# Patient Record
Sex: FEMALE | Race: WHITE | NOT HISPANIC OR LATINO | Employment: OTHER | URBAN - NONMETROPOLITAN AREA
[De-identification: names, ages, dates, MRNs, and addresses within clinical notes are randomized per-mention and may not be internally consistent; named-entity substitution may affect disease eponyms.]

---

## 2017-02-03 ENCOUNTER — TRANSFERRED RECORDS (OUTPATIENT)
Dept: HEALTH INFORMATION MANAGEMENT | Facility: HOSPITAL | Age: 32
End: 2017-02-03

## 2017-02-03 LAB — PAP-ABSTRACT: NORMAL

## 2017-11-17 ENCOUNTER — TRANSFERRED RECORDS (OUTPATIENT)
Dept: HEALTH INFORMATION MANAGEMENT | Facility: HOSPITAL | Age: 32
End: 2017-11-17

## 2017-11-17 LAB — HIV 1&2 EXT: NORMAL

## 2021-02-19 NOTE — PROGRESS NOTES
"    Assessment & Plan     1. Encounter to establish care    2. Hypothyroidism due to acquired atrophy of thyroid  - TSH with free T4 reflex  - Lipid Profile  - Basic metabolic panel    3. Tobacco abuse counseling  cessation encouraged    4. Family history of bleeding or clotting disorder  - Factor 2 and 5 mutation analysis      Chart reviewed.       Tobacco Cessation:   reports that she has been smoking cigarettes. She has a 8.50 pack-year smoking history. She has never used smokeless tobacco.  Tobacco Cessation Action Plan: Information offered: Patient not interested at this time    BMI:   Estimated body mass index is 27.74 kg/m  as calculated from the following:    Height as of this encounter: 1.689 m (5' 6.5\").    Weight as of this encounter: 79.2 kg (174 lb 8 oz).   Follow-up in 6-8 weeks.       Zohreh Aguila NP  [facility] [place] [facility]    Subjective   Manju is a 35 year old who presents for the following health issues     HPI       New Patient/Transfer of Care  Hypothyroidism Follow-up      Since last visit, patient describes the following symptoms: anxiety, depression, fatigue and hair loss      How many servings of fruits and vegetables do you eat daily?  2-3    On average, how many sweetened beverages do you drink each day (Examples: soda, juice, sweet tea, etc.  Do NOT count diet or artificially sweetened beverages)?   0    How many days per week do you exercise enough to make your heart beat faster? 6    How many minutes a day do you exercise enough to make your heart beat faster? 60 or more    How many days per week do you miss taking your medication? 0 was out of insurance so has not taken levothyroxine     She has 2 sisters with factor 5, mom also.  She has never been tested and is requested screening.       Review of Systems   Constitutional, HEENT, cardiovascular, pulmonary, gi and gu systems are negative, except as otherwise noted.      Objective    /68 (BP Location: Left " "arm, Patient Position: Chair, Cuff Size: Adult Large)   Pulse 66   Temp 98.3  F (36.8  C) (Tympanic)   Resp 16   Ht 1.689 m (5' 6.5\")   Wt 79.2 kg (174 lb 8 oz)   SpO2 95%   BMI 27.74 kg/m    Body mass index is 27.74 kg/m .  Physical Exam   GENERAL: healthy, alert and no distress  NECK: no adenopathy, no asymmetry, masses, or scars, thyroid normal to palpation and no carotid bruits  RESP: lungs clear to auscultation - no rales, rhonchi or wheezes  CV: regular rate and rhythm, normal S1 S2, no S3 or S4, no murmur, click or rub, no peripheral edema and peripheral pulses strong  MS: no gross musculoskeletal defects noted, no edema  PSYCH: mentation appears normal, affect normal/bright        Results for orders placed or performed in visit on 03/08/21   TSH with free T4 reflex     Status: Abnormal   Result Value Ref Range    TSH 4.09 (H) 0.40 - 4.00 mU/L   Lipid Profile     Status: Abnormal   Result Value Ref Range    Cholesterol 132 <200 mg/dL    Triglycerides 72 <150 mg/dL    HDL Cholesterol 36 (L) >49 mg/dL    LDL Cholesterol Calculated 82 <100 mg/dL    Non HDL Cholesterol 96 <130 mg/dL   Basic metabolic panel     Status: None   Result Value Ref Range    Sodium 136 133 - 144 mmol/L    Potassium 4.1 3.4 - 5.3 mmol/L    Chloride 106 94 - 109 mmol/L    Carbon Dioxide 25 20 - 32 mmol/L    Anion Gap 5 3 - 14 mmol/L    Glucose 95 70 - 99 mg/dL    Urea Nitrogen 13 7 - 30 mg/dL    Creatinine 0.72 0.52 - 1.04 mg/dL    GFR Estimate >90 >60 mL/min/[1.73_m2]    GFR Estimate If Black >90 >60 mL/min/[1.73_m2]    Calcium 9.1 8.5 - 10.1 mg/dL             "

## 2021-03-08 ENCOUNTER — OFFICE VISIT (OUTPATIENT)
Dept: FAMILY MEDICINE | Facility: OTHER | Age: 36
End: 2021-03-08
Attending: NURSE PRACTITIONER
Payer: COMMERCIAL

## 2021-03-08 VITALS
SYSTOLIC BLOOD PRESSURE: 130 MMHG | TEMPERATURE: 98.3 F | RESPIRATION RATE: 16 BRPM | HEIGHT: 67 IN | WEIGHT: 174.5 LBS | OXYGEN SATURATION: 95 % | HEART RATE: 66 BPM | BODY MASS INDEX: 27.39 KG/M2 | DIASTOLIC BLOOD PRESSURE: 68 MMHG

## 2021-03-08 DIAGNOSIS — Z76.89 ENCOUNTER TO ESTABLISH CARE: Primary | ICD-10-CM

## 2021-03-08 DIAGNOSIS — Z83.2 FAMILY HISTORY OF BLEEDING OR CLOTTING DISORDER: ICD-10-CM

## 2021-03-08 DIAGNOSIS — E03.4 HYPOTHYROIDISM DUE TO ACQUIRED ATROPHY OF THYROID: ICD-10-CM

## 2021-03-08 DIAGNOSIS — Z71.6 TOBACCO ABUSE COUNSELING: ICD-10-CM

## 2021-03-08 LAB
ANION GAP SERPL CALCULATED.3IONS-SCNC: 5 MMOL/L (ref 3–14)
BUN SERPL-MCNC: 13 MG/DL (ref 7–30)
CALCIUM SERPL-MCNC: 9.1 MG/DL (ref 8.5–10.1)
CHLORIDE SERPL-SCNC: 106 MMOL/L (ref 94–109)
CHOLEST SERPL-MCNC: 132 MG/DL
CO2 SERPL-SCNC: 25 MMOL/L (ref 20–32)
CREAT SERPL-MCNC: 0.72 MG/DL (ref 0.52–1.04)
GFR SERPL CREATININE-BSD FRML MDRD: >90 ML/MIN/{1.73_M2}
GLUCOSE SERPL-MCNC: 95 MG/DL (ref 70–99)
HDLC SERPL-MCNC: 36 MG/DL
LDLC SERPL CALC-MCNC: 82 MG/DL
NONHDLC SERPL-MCNC: 96 MG/DL
POTASSIUM SERPL-SCNC: 4.1 MMOL/L (ref 3.4–5.3)
SODIUM SERPL-SCNC: 136 MMOL/L (ref 133–144)
T4 FREE SERPL-MCNC: 0.94 NG/DL (ref 0.76–1.46)
TRIGL SERPL-MCNC: 72 MG/DL
TSH SERPL DL<=0.005 MIU/L-ACNC: 4.09 MU/L (ref 0.4–4)

## 2021-03-08 PROCEDURE — 99204 OFFICE O/P NEW MOD 45 MIN: CPT | Performed by: NURSE PRACTITIONER

## 2021-03-08 PROCEDURE — 81241 F5 GENE: CPT | Performed by: NURSE PRACTITIONER

## 2021-03-08 PROCEDURE — 36415 COLL VENOUS BLD VENIPUNCTURE: CPT | Performed by: NURSE PRACTITIONER

## 2021-03-08 PROCEDURE — 84443 ASSAY THYROID STIM HORMONE: CPT | Performed by: NURSE PRACTITIONER

## 2021-03-08 PROCEDURE — 81240 F2 GENE: CPT | Performed by: NURSE PRACTITIONER

## 2021-03-08 PROCEDURE — 84439 ASSAY OF FREE THYROXINE: CPT | Performed by: NURSE PRACTITIONER

## 2021-03-08 PROCEDURE — 80061 LIPID PANEL: CPT | Performed by: NURSE PRACTITIONER

## 2021-03-08 PROCEDURE — 80048 BASIC METABOLIC PNL TOTAL CA: CPT | Performed by: NURSE PRACTITIONER

## 2021-03-08 RX ORDER — ALBUTEROL SULFATE 90 UG/1
2 AEROSOL, METERED RESPIRATORY (INHALATION) EVERY 6 HOURS
COMMUNITY
End: 2022-11-07

## 2021-03-08 RX ORDER — LEVOTHYROXINE SODIUM 200 UG/1
200 TABLET ORAL
COMMUNITY
Start: 2012-12-05 | End: 2021-03-10 | Stop reason: ALTCHOICE

## 2021-03-08 SDOH — HEALTH STABILITY: MENTAL HEALTH: HOW OFTEN DO YOU HAVE A DRINK CONTAINING ALCOHOL?: MONTHLY OR LESS

## 2021-03-08 SDOH — HEALTH STABILITY: MENTAL HEALTH: HOW OFTEN DO YOU HAVE 6 OR MORE DRINKS ON ONE OCCASION?: NOT ASKED

## 2021-03-08 SDOH — HEALTH STABILITY: MENTAL HEALTH: HOW MANY STANDARD DRINKS CONTAINING ALCOHOL DO YOU HAVE ON A TYPICAL DAY?: NOT ASKED

## 2021-03-08 ASSESSMENT — MIFFLIN-ST. JEOR: SCORE: 1521.22

## 2021-03-08 ASSESSMENT — ANXIETY QUESTIONNAIRES
4. TROUBLE RELAXING: SEVERAL DAYS
5. BEING SO RESTLESS THAT IT IS HARD TO SIT STILL: NOT AT ALL
1. FEELING NERVOUS, ANXIOUS, OR ON EDGE: NOT AT ALL
IF YOU CHECKED OFF ANY PROBLEMS ON THIS QUESTIONNAIRE, HOW DIFFICULT HAVE THESE PROBLEMS MADE IT FOR YOU TO DO YOUR WORK, TAKE CARE OF THINGS AT HOME, OR GET ALONG WITH OTHER PEOPLE: NOT DIFFICULT AT ALL
6. BECOMING EASILY ANNOYED OR IRRITABLE: NOT AT ALL
GAD7 TOTAL SCORE: 2
3. WORRYING TOO MUCH ABOUT DIFFERENT THINGS: SEVERAL DAYS
2. NOT BEING ABLE TO STOP OR CONTROL WORRYING: NOT AT ALL
7. FEELING AFRAID AS IF SOMETHING AWFUL MIGHT HAPPEN: NOT AT ALL

## 2021-03-08 ASSESSMENT — PAIN SCALES - GENERAL: PAINLEVEL: NO PAIN (0)

## 2021-03-08 ASSESSMENT — PATIENT HEALTH QUESTIONNAIRE - PHQ9: SUM OF ALL RESPONSES TO PHQ QUESTIONS 1-9: 4

## 2021-03-08 NOTE — NURSING NOTE
"Chief Complaint   Patient presents with     Establish Care       Initial /68 (BP Location: Left arm, Patient Position: Chair, Cuff Size: Adult Large)   Pulse 66   Temp 98.3  F (36.8  C) (Tympanic)   Resp 16   Ht 1.689 m (5' 6.5\")   Wt 79.2 kg (174 lb 8 oz)   SpO2 95%   BMI 27.74 kg/m   Estimated body mass index is 27.74 kg/m  as calculated from the following:    Height as of this encounter: 1.689 m (5' 6.5\").    Weight as of this encounter: 79.2 kg (174 lb 8 oz).  Medication Reconciliation: complete  Pamela M. Lechevalier, LPN    "

## 2021-03-09 ASSESSMENT — ANXIETY QUESTIONNAIRES: GAD7 TOTAL SCORE: 2

## 2021-03-10 RX ORDER — LEVOTHYROXINE SODIUM 50 UG/1
50 TABLET ORAL DAILY
Qty: 90 TABLET | Refills: 1 | Status: SHIPPED | OUTPATIENT
Start: 2021-03-10 | End: 2021-09-01

## 2021-03-11 LAB — COPATH REPORT: NORMAL

## 2021-04-10 ENCOUNTER — HEALTH MAINTENANCE LETTER (OUTPATIENT)
Age: 36
End: 2021-04-10

## 2021-08-31 DIAGNOSIS — E03.4 HYPOTHYROIDISM DUE TO ACQUIRED ATROPHY OF THYROID: ICD-10-CM

## 2021-09-01 RX ORDER — LEVOTHYROXINE SODIUM 50 UG/1
TABLET ORAL
Qty: 90 TABLET | Refills: 1 | Status: SHIPPED | OUTPATIENT
Start: 2021-09-01 | End: 2021-10-19

## 2021-09-01 NOTE — TELEPHONE ENCOUNTER
Synthroid       Last Written Prescription Date:  3/10/2021  Last Fill Quantity: 90,   # refills: 1  Last Office Visit: 3/8/2021  Future Office visit:

## 2021-09-19 ENCOUNTER — HEALTH MAINTENANCE LETTER (OUTPATIENT)
Age: 36
End: 2021-09-19

## 2021-10-11 ENCOUNTER — TELEPHONE (OUTPATIENT)
Dept: FAMILY MEDICINE | Facility: OTHER | Age: 36
End: 2021-10-11

## 2021-10-11 NOTE — TELEPHONE ENCOUNTER
11:21 AM    Reason for Call: OVERBOOK    This message came into MyChart requests, no appointments available, please advise pt    Message    Appointment Request From: Manju Weiss      With Provider: Zohreh Aguila NP [[facility]]      Preferred Date Range: 10/12/2021 - 10/14/2021      Preferred Times: Tuesday Morning, Thursday Morning      Reason for visit: Request an Appointment      Comments:   Thyroid check, lump in neck

## 2021-10-13 ENCOUNTER — OFFICE VISIT (OUTPATIENT)
Dept: FAMILY MEDICINE | Facility: OTHER | Age: 36
End: 2021-10-13
Attending: NURSE PRACTITIONER
Payer: COMMERCIAL

## 2021-10-13 VITALS
TEMPERATURE: 98.4 F | HEART RATE: 73 BPM | OXYGEN SATURATION: 96 % | BODY MASS INDEX: 27.33 KG/M2 | RESPIRATION RATE: 16 BRPM | SYSTOLIC BLOOD PRESSURE: 150 MMHG | DIASTOLIC BLOOD PRESSURE: 86 MMHG | WEIGHT: 174.1 LBS | HEIGHT: 67 IN

## 2021-10-13 DIAGNOSIS — E03.4 HYPOTHYROIDISM DUE TO ACQUIRED ATROPHY OF THYROID: Primary | ICD-10-CM

## 2021-10-13 DIAGNOSIS — R19.09 LEFT GROIN MASS: ICD-10-CM

## 2021-10-13 DIAGNOSIS — F17.200 TOBACCO USE DISORDER: ICD-10-CM

## 2021-10-13 DIAGNOSIS — Z23 NEED FOR PROPHYLACTIC VACCINATION AND INOCULATION AGAINST INFLUENZA: ICD-10-CM

## 2021-10-13 DIAGNOSIS — R59.1 LYMPHADENOPATHY: ICD-10-CM

## 2021-10-13 LAB
ANION GAP SERPL CALCULATED.3IONS-SCNC: 6 MMOL/L (ref 3–14)
BASOPHILS # BLD AUTO: 0 10E3/UL (ref 0–0.2)
BASOPHILS NFR BLD AUTO: 0 %
BUN SERPL-MCNC: 10 MG/DL (ref 7–30)
CALCIUM SERPL-MCNC: 9 MG/DL (ref 8.5–10.1)
CHLORIDE BLD-SCNC: 107 MMOL/L (ref 94–109)
CO2 SERPL-SCNC: 26 MMOL/L (ref 20–32)
CREAT SERPL-MCNC: 0.69 MG/DL (ref 0.52–1.04)
EOSINOPHIL # BLD AUTO: 0.2 10E3/UL (ref 0–0.7)
EOSINOPHIL NFR BLD AUTO: 3 %
ERYTHROCYTE [DISTWIDTH] IN BLOOD BY AUTOMATED COUNT: 12 % (ref 10–15)
GFR SERPL CREATININE-BSD FRML MDRD: >90 ML/MIN/1.73M2
GLUCOSE BLD-MCNC: 99 MG/DL (ref 70–99)
HCT VFR BLD AUTO: 44.8 % (ref 35–47)
HGB BLD-MCNC: 15.8 G/DL (ref 11.7–15.7)
LYMPHOCYTES # BLD AUTO: 2.6 10E3/UL (ref 0.8–5.3)
LYMPHOCYTES NFR BLD AUTO: 28 %
MCH RBC QN AUTO: 32.4 PG (ref 26.5–33)
MCHC RBC AUTO-ENTMCNC: 35.3 G/DL (ref 31.5–36.5)
MCV RBC AUTO: 92 FL (ref 78–100)
MONOCYTES # BLD AUTO: 0.7 10E3/UL (ref 0–1.3)
MONOCYTES NFR BLD AUTO: 8 %
NEUTROPHILS # BLD AUTO: 5.5 10E3/UL (ref 1.6–8.3)
NEUTROPHILS NFR BLD AUTO: 61 %
PLATELET # BLD AUTO: 179 10E3/UL (ref 150–450)
POTASSIUM BLD-SCNC: 3.5 MMOL/L (ref 3.4–5.3)
RBC # BLD AUTO: 4.87 10E6/UL (ref 3.8–5.2)
SODIUM SERPL-SCNC: 139 MMOL/L (ref 133–144)
TSH SERPL DL<=0.005 MIU/L-ACNC: 3.88 MU/L (ref 0.4–4)
WBC # BLD AUTO: 9 10E3/UL (ref 4–11)

## 2021-10-13 PROCEDURE — 90471 IMMUNIZATION ADMIN: CPT | Performed by: NURSE PRACTITIONER

## 2021-10-13 PROCEDURE — 84443 ASSAY THYROID STIM HORMONE: CPT | Performed by: NURSE PRACTITIONER

## 2021-10-13 PROCEDURE — 99214 OFFICE O/P EST MOD 30 MIN: CPT | Mod: 25 | Performed by: NURSE PRACTITIONER

## 2021-10-13 PROCEDURE — 36415 COLL VENOUS BLD VENIPUNCTURE: CPT | Performed by: NURSE PRACTITIONER

## 2021-10-13 PROCEDURE — 80048 BASIC METABOLIC PNL TOTAL CA: CPT | Performed by: NURSE PRACTITIONER

## 2021-10-13 PROCEDURE — 90686 IIV4 VACC NO PRSV 0.5 ML IM: CPT | Performed by: NURSE PRACTITIONER

## 2021-10-13 PROCEDURE — 85025 COMPLETE CBC W/AUTO DIFF WBC: CPT | Performed by: NURSE PRACTITIONER

## 2021-10-13 ASSESSMENT — PAIN SCALES - GENERAL: PAINLEVEL: NO PAIN (0)

## 2021-10-13 ASSESSMENT — MIFFLIN-ST. JEOR: SCORE: 1514.4

## 2021-10-13 NOTE — PROGRESS NOTES
"    Assessment & Plan     1. Hypothyroidism due to acquired atrophy of thyroid  - TSH with free T4 reflex; Future  - Basic metabolic panel; Future    2. Lymphadenopathy  - CBC with platelets and differential; Future  - US Head Neck Soft Tissue; Future     3. Need for prophylactic vaccination and inoculation against influenza  - INFLUENZA VACCINE IM > 6 MONTHS VALENT IIV4 (AFLURIA/FLUZONE)    4. Left groin mass  - US Extremity Non Vascular Left; Future    Smoking cessation encouraged.      BMI:   Estimated body mass index is 27.68 kg/m  as calculated from the following:    Height as of this encounter: 1.689 m (5' 6.5\").    Weight as of this encounter: 79 kg (174 lb 1.6 oz).   Weight management plan: Discussed healthy diet and exercise guidelines    Follow-up as needed per thyroid results.       Zohreh Aguila NP  [facility] [place] [facility]    Subjective   Manju is a 36 year old who presents for the following health issues     HPI     Concern - Lump in neck and groin area  Onset: 2 months  Description: lump in left groin area and right neck  Intensity: mild  Progression of Symptoms:  worsening  Accompanying Signs & Symptoms: they are seeming to enlarge  Previous history of similar problem: nonw  Precipitating factors:        Worsened by: none  Alleviating factors:        Improved by: none  Therapies tried and outcome:  none     She is worried as her sister has thyroid cancer, mother with lung cancer.      Hypothyroidism Follow-up      Since last visit, patient describes the following symptoms: Weight stable, no hair loss, no skin changes, no constipation, no loose stools    There is no problem list on file for this patient.    Past Surgical History:   Procedure Laterality Date     COLONOSCOPY  2013     TONSILLECTOMY  2001       Social History     Tobacco Use     Smoking status: Current Every Day Smoker     Packs/day: 0.50     Years: 17.00     Pack years: 8.50     Types: Cigarettes     Smokeless " tobacco: Never Used   Substance Use Topics     Alcohol use: Yes     Family History   Problem Relation Age of Onset     Asthma Mother      Mental Illness Mother      Cancer Mother      Lung Cancer Mother      Factor IX deficiency Mother      Alcoholism Father      Obesity Sister      Thyroid Cancer Sister      Uterine Cancer Sister      Factor IX deficiency Sister      Ovarian Cancer Maternal Grandmother      Diabetes Paternal Grandmother      Coronary Artery Disease Paternal Grandmother      Heart Disease Paternal Grandmother      Hypertension Paternal Grandmother      Hyperlipidemia Paternal Grandmother      Kidney Disease Paternal Grandmother      Cerebrovascular Disease Paternal Grandmother      Parkinsonism Paternal Grandfather      Diabetes Sister      Factor IX deficiency Sister          Current Outpatient Medications   Medication Sig Dispense Refill     albuterol (PROAIR HFA/PROVENTIL HFA/VENTOLIN HFA) 108 (90 Base) MCG/ACT inhaler Inhale 2 puffs into the lungs every 6 hours       levonorgestrel (MIRENA) 20 MCG/24HR IUD 1 each by Intrauterine route once       levothyroxine (SYNTHROID/LEVOTHROID) 75 MCG tablet Take 1 tablet (75 mcg) by mouth daily 90 tablet 1     Allergies   Allergen Reactions     Sulfa Drugs Shortness Of Breath     Latex Hives, Itching and Rash     Recent Labs   Lab Test 10/13/21  1231 03/08/21  1148   LDL  --  82   HDL  --  36*   TRIG  --  72   CR 0.69 0.72   GFRESTIMATED >90 >90   GFRESTBLACK  --  >90   POTASSIUM 3.5 4.1   TSH 3.88 4.09*      BP Readings from Last 3 Encounters:   10/13/21 (!) 150/86   03/08/21 130/68    Wt Readings from Last 3 Encounters:   10/13/21 79 kg (174 lb 1.6 oz)   03/08/21 79.2 kg (174 lb 8 oz)                Review of Systems   Constitutional, HEENT, cardiovascular, pulmonary, gi and gu systems are negative, except as otherwise noted.      Objective    BP (!) 150/86 (BP Location: Left arm, Patient Position: Sitting, Cuff Size: Adult Regular)   Pulse 73   Temp  "98.4  F (36.9  C) (Tympanic)   Resp 16   Ht 1.689 m (5' 6.5\")   Wt 79 kg (174 lb 1.6 oz)   SpO2 96%   BMI 27.68 kg/m    Body mass index is 27.68 kg/m .  Physical Exam   GENERAL: healthy, alert and no distress  NECK: no asymmetry, masses, or scars and thyroid normal to palpation, there is one small nodule posterior neck that is non-tender, mobile and consistent with lymph node.    RESP: lungs clear to auscultation - no rales, rhonchi or wheezes  CV: regular rate and rhythm, normal S1 S2, no S3 or S4, no murmur, click or rub, no peripheral edema and peripheral pulses strong  MS: no gross musculoskeletal defects noted, no edema  LYMPH: inguinal: enlarged lymph nodes in the left groin area.    Results for orders placed or performed in visit on 10/13/21   TSH with free T4 reflex     Status: Normal   Result Value Ref Range    TSH 3.88 0.40 - 4.00 mU/L   Basic metabolic panel     Status: Normal   Result Value Ref Range    Sodium 139 133 - 144 mmol/L    Potassium 3.5 3.4 - 5.3 mmol/L    Chloride 107 94 - 109 mmol/L    Carbon Dioxide (CO2) 26 20 - 32 mmol/L    Anion Gap 6 3 - 14 mmol/L    Urea Nitrogen 10 7 - 30 mg/dL    Creatinine 0.69 0.52 - 1.04 mg/dL    Calcium 9.0 8.5 - 10.1 mg/dL    Glucose 99 70 - 99 mg/dL    GFR Estimate >90 >60 mL/min/1.73m2   CBC with platelets and differential     Status: Abnormal   Result Value Ref Range    WBC Count 9.0 4.0 - 11.0 10e3/uL    RBC Count 4.87 3.80 - 5.20 10e6/uL    Hemoglobin 15.8 (H) 11.7 - 15.7 g/dL    Hematocrit 44.8 35.0 - 47.0 %    MCV 92 78 - 100 fL    MCH 32.4 26.5 - 33.0 pg    MCHC 35.3 31.5 - 36.5 g/dL    RDW 12.0 10.0 - 15.0 %    Platelet Count 179 150 - 450 10e3/uL    % Neutrophils 61 %    % Lymphocytes 28 %    % Monocytes 8 %    % Eosinophils 3 %    % Basophils 0 %    Absolute Neutrophils 5.5 1.6 - 8.3 10e3/uL    Absolute Lymphocytes 2.6 0.8 - 5.3 10e3/uL    Absolute Monocytes 0.7 0.0 - 1.3 10e3/uL    Absolute Eosinophils 0.2 0.0 - 0.7 10e3/uL    Absolute Basophils " 0.0 0.0 - 0.2 10e3/uL   CBC with platelets and differential     Status: Abnormal    Narrative    The following orders were created for panel order CBC with platelets and differential.  Procedure                               Abnormality         Status                     ---------                               -----------         ------                     CBC with platelets and d...[[id number]]  Abnormal            Final result                 Please view results for these tests on the individual orders.

## 2021-10-13 NOTE — NURSING NOTE
"Chief Complaint   Patient presents with     Derm Problem     Imm/Inj     Flu Shot       Initial BP (!) 150/86 (BP Location: Left arm, Patient Position: Sitting, Cuff Size: Adult Regular)   Pulse 73   Temp 98.4  F (36.9  C) (Tympanic)   Resp 16   Ht 1.689 m (5' 6.5\")   Wt 79 kg (174 lb 1.6 oz)   SpO2 96%   BMI 27.68 kg/m   Estimated body mass index is 27.68 kg/m  as calculated from the following:    Height as of this encounter: 1.689 m (5' 6.5\").    Weight as of this encounter: 79 kg (174 lb 1.6 oz).  Medication Reconciliation: complete  Tessa Garcia MA  "

## 2021-10-13 NOTE — PATIENT INSTRUCTIONS
"    Assessment & Plan     1. Hypothyroidism due to acquired atrophy of thyroid  - TSH with free T4 reflex; Future  - Basic metabolic panel; Future    2. Lymphadenopathy  - CBC with platelets and differential; Future  - US Head Neck Soft Tissue; Future and US groin    3. Need for prophylactic vaccination and inoculation against influenza  - INFLUENZA VACCINE IM > 6 MONTHS VALENT IIV4 (AFLURIA/FLUZONE)         BMI:   Estimated body mass index is 27.68 kg/m  as calculated from the following:    Height as of this encounter: 1.689 m (5' 6.5\").    Weight as of this encounter: 79 kg (174 lb 1.6 oz).   Weight management plan: Discussed healthy diet and exercise guidelines    Follow-up as needed per thyroid results.       Zohreh Aguila, NP  [facility]      "

## 2021-11-09 ENCOUNTER — HOSPITAL ENCOUNTER (OUTPATIENT)
Dept: ULTRASOUND IMAGING | Facility: HOSPITAL | Age: 36
End: 2021-11-09
Attending: NURSE PRACTITIONER
Payer: COMMERCIAL

## 2021-11-09 DIAGNOSIS — R19.09 LEFT GROIN MASS: ICD-10-CM

## 2021-11-09 DIAGNOSIS — R59.1 LYMPHADENOPATHY: ICD-10-CM

## 2021-11-09 PROCEDURE — 76536 US EXAM OF HEAD AND NECK: CPT

## 2021-11-09 PROCEDURE — 76882 US LMTD JT/FCL EVL NVASC XTR: CPT | Mod: LT

## 2022-04-18 DIAGNOSIS — E03.4 HYPOTHYROIDISM DUE TO ACQUIRED ATROPHY OF THYROID: ICD-10-CM

## 2022-04-19 RX ORDER — LEVOTHYROXINE SODIUM 75 UG/1
75 TABLET ORAL DAILY
Qty: 90 TABLET | Refills: 1 | Status: SHIPPED | OUTPATIENT
Start: 2022-04-19 | End: 2022-10-17

## 2022-11-03 NOTE — PROGRESS NOTES
SUBJECTIVE:   CC: Manju is an 37 year old who presents for preventive health visit.       Patient has been advised of split billing requirements and indicates understanding: Yes  Healthy Habits:     Getting at least 3 servings of Calcium per day:  Yes    Bi-annual eye exam:  Yes    Dental care twice a year:  Yes    Sleep apnea or symptoms of sleep apnea:  None    Diet:  Regular (no restrictions)    Frequency of exercise:  4-5 days/week    Duration of exercise:  30-45 minutes    Taking medications regularly:  Yes    Medication side effects:  None    PHQ-2 Total Score: 0    Additional concerns today:  No          Hypothyroidism Follow-up      Since last visit, patient describes the following symptoms: Weight stable, no hair loss, no skin changes, no constipation, no loose stools    Wheezing:    Has albuterol at home but is .  Has not had PFT's, mother with asthma.  She notes wheezing with triggers that include certain smells, cold air.  Has not been on a controller medication.      Today's PHQ-2 Score:   PHQ-2 (  [employer]) 2022   Q1: Little interest or pleasure in doing things 0   Q2: Feeling down, depressed or hopeless 0   PHQ-2 Score 0   Q1: Little interest or pleasure in doing things Not at all   Q2: Feeling down, depressed or hopeless Not at all   PHQ-2 Score 0       Abuse: Current or Past (Physical, Sexual or Emotional) - No  Do you feel safe in your environment? Yes        Social History     Tobacco Use     Smoking status: Every Day     Packs/day: 0.50     Years: 17.00     Pack years: 8.50     Types: Cigarettes     Smokeless tobacco: Never   Substance Use Topics     Alcohol use: Yes         Alcohol Use 2022   Prescreen: >3 drinks/day or >7 drinks/week? No       Reviewed orders with patient.  Reviewed health maintenance and updated orders accordingly - Yes  BP Readings from Last 3 Encounters:   22 126/74   10/13/21 (!) 150/86   21 130/68    Wt Readings from Last 3 Encounters:  "  11/07/22 83 kg (183 lb)   10/13/21 79 kg (174 lb 1.6 oz)   03/08/21 79.2 kg (174 lb 8 oz)                    Breast Cancer Screening:  Any new diagnosis of family breast, ovarian, or bowel cancer? No    FHS-7: No flowsheet data found.    Patient under 40 years of age: Routine Mammogram Screening not recommended.   Pertinent mammograms are reviewed under the imaging tab.    History of abnormal Pap smear: NO - age 30-65 PAP every 5 years with negative HPV co-testing recommended     Reviewed and updated as needed this visit by clinical staff   Tobacco  Allergies               Reviewed and updated as needed this visit by Provider                     Review of Systems  CONSTITUTIONAL: NEGATIVE for fever, chills, change in weight  INTEGUMENTARU/SKIN: NEGATIVE for worrisome rashes, moles or lesions  EYES: NEGATIVE for vision changes or irritation  ENT: NEGATIVE for ear, mouth and throat problems  RESP: NEGATIVE for significant cough or SOB  BREAST: NEGATIVE for masses, tenderness or discharge  CV: irregular heart beat, palpitations and tachycardia  GI: NEGATIVE for nausea, abdominal pain, heartburn, or change in bowel habits  : NEGATIVE for unusual urinary or vaginal symptoms. Periods are regular.  MUSCULOSKELETAL: NEGATIVE for significant arthralgias or myalgia  NEURO: NEGATIVE for weakness, dizziness or paresthesias  PSYCHIATRIC: NEGATIVE for changes in mood or affect     OBJECTIVE:   /74 (BP Location: Left arm, Patient Position: Sitting, Cuff Size: Adult Regular)   Pulse 56   Temp 98.1  F (36.7  C) (Tympanic)   Resp 16   Ht 1.689 m (5' 6.5\")   Wt 83 kg (183 lb)   SpO2 95%   BMI 29.09 kg/m    Physical Exam  GENERAL: healthy, alert and no distress  EYES: Eyes grossly normal to inspection, PERRL and conjunctivae and sclerae normal  HENT: ear canals and TM's normal, nose and mouth without ulcers or lesions  NECK: no adenopathy, no asymmetry, masses, or scars and thyroid normal to palpation  RESP: lungs " clear to auscultation - no rales, rhonchi or wheezes  CV: irregularly irregular rhythm, normal S1 S2, no S3 or S4, peripheral pulses strong and no peripheral edema, no murmur  ABDOMEN: soft, nontender, no hepatosplenomegaly, no masses and bowel sounds normal   (female): normal female external genitalia, normal urethral meatus, vaginal mucosa, normal cervix/adnexa/uterus without masses or discharge, pap obtained.   MS: no gross musculoskeletal defects noted, no edema  SKIN: no suspicious lesions or rashes  PSYCH: mentation appears normal, affect normal/bright         EKG Interpretation:      Interpreted by Zohreh Aguila NP    Symptoms at time of EKG: irregular pulse   Rhythm: Bigeminy  Rate: Normal  Axis: Normal  Ectopy: Premature ventricular contractions (unifocal) and Bigeminy  Conduction: Normal  ST Segments/ T Waves: Non-specific ST-T wave changes  Q Waves: None  Comparison to prior: No old EKG available    Clinical Impression: non-specific EKG        ASSESSMENT/PLAN:   1. Routine general medical examination at a health care facility  Exam completed     2. Hypothyroidism due to acquired atrophy of thyroid  - TSH with free T4 reflex; Future    3. Wheezing  - General PFT Lab (Please always keep checked); Future  - Pulmonary Function Test; Future  - albuterol (PROAIR HFA/PROVENTIL HFA/VENTOLIN HFA) 108 (90 Base) MCG/ACT inhaler; Inhale 2 puffs into the lungs every 4 hours as needed for shortness of breath / dyspnea or wheezing  Dispense: 18 g; Refill: 1  - fluticasone-salmeterol (ADVAIR) 100-50 MCG/ACT inhaler; Inhale 1 puff into the lungs every 12 hours  Dispense: 60 each; Refill: 3  - General PFT Lab (Please always keep checked)    4. Cervical cancer screening  - A pap thin layer screen with  HPV - recommended age 30 - 65 years    5. Need for vaccination  - INFLUENZA VACCINE IM > 6 MONTHS VALENT IIV4 (AFLURIA/FLUZONE)    6. Family history of diabetes mellitus  - Basic metabolic panel; Future    7.  "Palpitations  - Echocardiogram Complete; Future  - EKG 12-lead complete w/read - (Clinic Performed)  - Adult Leadless EKG Monitor 8 to 14 Days; Future    8. Pulse irregularity  - Adult Leadless EKG Monitor 8 to 14 Days; Future    9. Tobacco use disorder  Cessation encouraged     Patient has been advised of split billing requirements and indicates understanding: Yes      COUNSELING:  Reviewed preventive health counseling, as reflected in patient instructions       Regular exercise       Healthy diet/nutrition       Vision screening       Hearing screening       Immunizations  Flu vaccine updated today.           Estimated body mass index is 29.09 kg/m  as calculated from the following:    Height as of this encounter: 1.689 m (5' 6.5\").    Weight as of this encounter: 83 kg (183 lb).    Weight management plan: Discussed healthy diet and exercise guidelines    She reports that she has been smoking cigarettes. She has a 8.50 pack-year smoking history. She has never used smokeless tobacco.  Nicotine/Tobacco Cessation Plan:   Information offered: Patient not interested at this time      Counseling Resources:  ATP IV Guidelines  Pooled Cohorts Equation Calculator  Breast Cancer Risk Calculator  BRCA-Related Cancer Risk Assessment: FHS-7 Tool  FRAX Risk Assessment  ICSI Preventive Guidelines  Dietary Guidelines for Americans, 2010  USDA's MyPlate  ASA Prophylaxis  Lung CA Screening    Zohreh Aguila NP  [facility] [place] [facility]  "

## 2022-11-03 NOTE — PATIENT INSTRUCTIONS
ASSESSMENT/PLAN:   1. Routine general medical examination at a health care facility  Exam completed     2. Hypothyroidism due to acquired atrophy of thyroid  - TSH with free T4 reflex; Future    3. Wheezing  - General PFT Lab (Please always keep checked); Future  - Pulmonary Function Test; Future  - albuterol (PROAIR HFA/PROVENTIL HFA/VENTOLIN HFA) 108 (90 Base) MCG/ACT inhaler; Inhale 2 puffs into the lungs every 4 hours as needed for shortness of breath / dyspnea or wheezing  Dispense: 18 g; Refill: 1  - fluticasone-salmeterol (ADVAIR) 100-50 MCG/ACT inhaler; Inhale 1 puff into the lungs every 12 hours  Dispense: 60 each; Refill: 3  - General PFT Lab (Please always keep checked)    4. Cervical cancer screening  - A pap thin layer screen with  HPV - recommended age 30 - 65 years    5. Need for vaccination  - INFLUENZA VACCINE IM > 6 MONTHS VALENT IIV4 (AFLURIA/FLUZONE)    6. Family history of diabetes mellitus  - Basic metabolic panel; Future    7. Palpitations  - Echocardiogram Complete; Future  - EKG 12-lead complete w/read - (Clinic Performed)  - Adult Leadless EKG Monitor 8 to 14 Days; Future    8. Pulse irregularity  - Adult Leadless EKG Monitor 8 to 14 Days; Future      Will notify of results as they are returned and develop follow-up plan    Zohreh Aguila,   Certified Adult Nurse Practitioner  [phone]      Preventive Health Recommendations  Female Ages 26 - 39  Yearly exam:   See your health care provider every year in order to  Review health changes.   Discuss preventive care.    Review your medicines if you your doctor has prescribed any.    Until age 30: Get a Pap test every three years (more often if you have had an abnormal result).    After age 30: Talk to your doctor about whether you should have a Pap test every 3 years or have a Pap test with HPV screening every 5 years.   You do not need a Pap test if your uterus was removed (hysterectomy) and you have not had cancer.  You should be  tested each year for STDs (sexually transmitted diseases), if you're at risk.   Talk to your provider about how often to have your cholesterol checked.  If you are at risk for diabetes, you should have a diabetes test (fasting glucose).  Shots: Get a flu shot each year. Get a tetanus shot every 10 years.   Nutrition:   Eat at least 5 servings of fruits and vegetables each day.  Eat whole-grain bread, whole-wheat pasta and brown rice instead of white grains and rice.  Get adequate Calcium and Vitamin D.     Lifestyle  Exercise at least 150 minutes a week (30 minutes a day, 5 days of the week). This will help you control your weight and prevent disease.  Limit alcohol to one drink per day.  No smoking.   Wear sunscreen to prevent skin cancer.  See your dentist every six months for an exam and cleaning.

## 2022-11-07 ENCOUNTER — OFFICE VISIT (OUTPATIENT)
Dept: FAMILY MEDICINE | Facility: OTHER | Age: 37
End: 2022-11-07
Attending: NURSE PRACTITIONER
Payer: COMMERCIAL

## 2022-11-07 VITALS
WEIGHT: 183 LBS | TEMPERATURE: 98.1 F | OXYGEN SATURATION: 95 % | HEART RATE: 56 BPM | SYSTOLIC BLOOD PRESSURE: 126 MMHG | DIASTOLIC BLOOD PRESSURE: 74 MMHG | BODY MASS INDEX: 28.72 KG/M2 | RESPIRATION RATE: 16 BRPM | HEIGHT: 67 IN

## 2022-11-07 DIAGNOSIS — R09.89 PULSE IRREGULARITY: ICD-10-CM

## 2022-11-07 DIAGNOSIS — Z23 NEED FOR VACCINATION: ICD-10-CM

## 2022-11-07 DIAGNOSIS — R00.2 PALPITATIONS: ICD-10-CM

## 2022-11-07 DIAGNOSIS — F17.200 TOBACCO USE DISORDER: ICD-10-CM

## 2022-11-07 DIAGNOSIS — Z83.3 FAMILY HISTORY OF DIABETES MELLITUS: ICD-10-CM

## 2022-11-07 DIAGNOSIS — E03.4 HYPOTHYROIDISM DUE TO ACQUIRED ATROPHY OF THYROID: ICD-10-CM

## 2022-11-07 DIAGNOSIS — Z00.00 ROUTINE GENERAL MEDICAL EXAMINATION AT A HEALTH CARE FACILITY: Primary | ICD-10-CM

## 2022-11-07 DIAGNOSIS — Z12.4 CERVICAL CANCER SCREENING: ICD-10-CM

## 2022-11-07 DIAGNOSIS — R06.2 WHEEZING: ICD-10-CM

## 2022-11-07 LAB
ANION GAP SERPL CALCULATED.3IONS-SCNC: 12 MMOL/L (ref 7–15)
BUN SERPL-MCNC: 12.7 MG/DL (ref 6–20)
CALCIUM SERPL-MCNC: 9.4 MG/DL (ref 8.6–10)
CHLORIDE SERPL-SCNC: 101 MMOL/L (ref 98–107)
CREAT SERPL-MCNC: 0.69 MG/DL (ref 0.51–0.95)
DEPRECATED HCO3 PLAS-SCNC: 23 MMOL/L (ref 22–29)
GFR SERPL CREATININE-BSD FRML MDRD: >90 ML/MIN/1.73M2
GLUCOSE SERPL-MCNC: 100 MG/DL (ref 70–99)
HOLD SPECIMEN: NORMAL
POTASSIUM SERPL-SCNC: 4.2 MMOL/L (ref 3.4–5.3)
SODIUM SERPL-SCNC: 136 MMOL/L (ref 136–145)
T4 FREE SERPL-MCNC: 1.32 NG/DL (ref 0.9–1.7)
TSH SERPL DL<=0.005 MIU/L-ACNC: 5.73 UIU/ML (ref 0.3–4.2)

## 2022-11-07 PROCEDURE — 36415 COLL VENOUS BLD VENIPUNCTURE: CPT | Performed by: NURSE PRACTITIONER

## 2022-11-07 PROCEDURE — 87624 HPV HI-RISK TYP POOLED RSLT: CPT | Performed by: NURSE PRACTITIONER

## 2022-11-07 PROCEDURE — 80048 BASIC METABOLIC PNL TOTAL CA: CPT | Performed by: NURSE PRACTITIONER

## 2022-11-07 PROCEDURE — 99395 PREV VISIT EST AGE 18-39: CPT | Mod: 25 | Performed by: NURSE PRACTITIONER

## 2022-11-07 PROCEDURE — 84443 ASSAY THYROID STIM HORMONE: CPT | Performed by: NURSE PRACTITIONER

## 2022-11-07 PROCEDURE — 84439 ASSAY OF FREE THYROXINE: CPT | Performed by: NURSE PRACTITIONER

## 2022-11-07 PROCEDURE — 90686 IIV4 VACC NO PRSV 0.5 ML IM: CPT | Performed by: NURSE PRACTITIONER

## 2022-11-07 PROCEDURE — 90471 IMMUNIZATION ADMIN: CPT | Performed by: NURSE PRACTITIONER

## 2022-11-07 PROCEDURE — G0123 SCREEN CERV/VAG THIN LAYER: HCPCS | Performed by: NURSE PRACTITIONER

## 2022-11-07 PROCEDURE — 99213 OFFICE O/P EST LOW 20 MIN: CPT | Mod: 25 | Performed by: NURSE PRACTITIONER

## 2022-11-07 PROCEDURE — 93000 ELECTROCARDIOGRAM COMPLETE: CPT | Mod: 77 | Performed by: INTERNAL MEDICINE

## 2022-11-07 RX ORDER — ALBUTEROL SULFATE 90 UG/1
2 AEROSOL, METERED RESPIRATORY (INHALATION) EVERY 4 HOURS PRN
Qty: 18 G | Refills: 1 | Status: SHIPPED | OUTPATIENT
Start: 2022-11-07

## 2022-11-07 RX ORDER — MULTIPLE VITAMINS W/ MINERALS TAB 9MG-400MCG
1 TAB ORAL DAILY
COMMUNITY

## 2022-11-07 RX ORDER — FLUTICASONE PROPIONATE AND SALMETEROL 100; 50 UG/1; UG/1
1 POWDER RESPIRATORY (INHALATION) EVERY 12 HOURS
Qty: 60 EACH | Refills: 3 | Status: SHIPPED | OUTPATIENT
Start: 2022-11-07 | End: 2023-03-23

## 2022-11-07 ASSESSMENT — ENCOUNTER SYMPTOMS
CHILLS: 0
EYE PAIN: 0
DIZZINESS: 0
DYSURIA: 0
BREAST MASS: 0
SHORTNESS OF BREATH: 1
ARTHRALGIAS: 0
HEMATOCHEZIA: 0
MYALGIAS: 0
HEADACHES: 0
HEMATURIA: 0
DIARRHEA: 0
PARESTHESIAS: 0
SORE THROAT: 0
CONSTIPATION: 0
WEAKNESS: 0
NAUSEA: 0
FEVER: 0
JOINT SWELLING: 0
FREQUENCY: 0
PALPITATIONS: 0
COUGH: 1
HEARTBURN: 0
NERVOUS/ANXIOUS: 0
ABDOMINAL PAIN: 0

## 2022-11-07 NOTE — NURSING NOTE
"Chief Complaint   Patient presents with     Physical       Initial /74 (BP Location: Left arm, Patient Position: Sitting, Cuff Size: Adult Regular)   Pulse 56   Temp 98.1  F (36.7  C) (Tympanic)   Resp 16   Ht 1.689 m (5' 6.5\")   Wt 83 kg (183 lb)   SpO2 95%   BMI 29.09 kg/m   Estimated body mass index is 29.09 kg/m  as calculated from the following:    Height as of this encounter: 1.689 m (5' 6.5\").    Weight as of this encounter: 83 kg (183 lb).  Medication Reconciliation: complete  Danielle Dubois LPN    "

## 2022-11-08 ENCOUNTER — HOSPITAL ENCOUNTER (OUTPATIENT)
Dept: CARDIOLOGY | Facility: HOSPITAL | Age: 37
Discharge: HOME | End: 2022-11-08
Attending: NURSE PRACTITIONER | Admitting: INTERNAL MEDICINE
Payer: COMMERCIAL

## 2022-11-08 DIAGNOSIS — R09.89 PULSE IRREGULARITY: ICD-10-CM

## 2022-11-08 DIAGNOSIS — R00.2 PALPITATIONS: ICD-10-CM

## 2022-11-08 PROCEDURE — 93248 EXT ECG>7D<15D REV&INTERPJ: CPT | Performed by: INTERNAL MEDICINE

## 2022-11-08 PROCEDURE — 93246 EXT ECG>7D<15D RECORDING: CPT

## 2022-11-11 LAB
BKR LAB AP GYN ADEQUACY: NORMAL
BKR LAB AP GYN INTERPRETATION: NORMAL
BKR LAB AP HPV REFLEX: NORMAL
BKR LAB AP PREVIOUS ABNORMAL: NORMAL
PATH REPORT.COMMENTS IMP SPEC: NORMAL
PATH REPORT.COMMENTS IMP SPEC: NORMAL
PATH REPORT.RELEVANT HX SPEC: NORMAL

## 2022-11-14 LAB
HUMAN PAPILLOMA VIRUS 16 DNA: NEGATIVE
HUMAN PAPILLOMA VIRUS 18 DNA: NEGATIVE
HUMAN PAPILLOMA VIRUS FINAL DIAGNOSIS: NORMAL
HUMAN PAPILLOMA VIRUS OTHER HR: NEGATIVE

## 2022-11-29 DIAGNOSIS — I47.29 PAROXYSMAL VENTRICULAR TACHYCARDIA (H): Primary | ICD-10-CM

## 2022-11-30 NOTE — RESULT ENCOUNTER NOTE
RN CC spoke with DI who states 12/8 ECHO is earliest date available at this time. Then spoke with RN cardiology CC who was able to get pt on [facility] Cardiology CNP tomorrow at 1:15 to assess and determine if there are any acute needs while waiting for ECHO to be completed. Arrival time for apt is 1:15 PM on 12/1. Pt notified of results and verbalized understanding of POC and apt tomorrow with no additional questions.

## 2022-12-01 ENCOUNTER — OFFICE VISIT (OUTPATIENT)
Dept: CARDIOLOGY | Facility: OTHER | Age: 37
End: 2022-12-01
Attending: NURSE PRACTITIONER
Payer: COMMERCIAL

## 2022-12-01 ENCOUNTER — PATIENT OUTREACH (OUTPATIENT)
Dept: CARE COORDINATION | Facility: OTHER | Age: 37
End: 2022-12-01

## 2022-12-01 VITALS
DIASTOLIC BLOOD PRESSURE: 68 MMHG | SYSTOLIC BLOOD PRESSURE: 126 MMHG | RESPIRATION RATE: 19 BRPM | TEMPERATURE: 97 F | HEART RATE: 85 BPM | WEIGHT: 184.8 LBS | OXYGEN SATURATION: 97 % | BODY MASS INDEX: 29.38 KG/M2

## 2022-12-01 DIAGNOSIS — I47.29 PAROXYSMAL VENTRICULAR TACHYCARDIA (H): Primary | ICD-10-CM

## 2022-12-01 DIAGNOSIS — I49.3 FREQUENT PVCS: ICD-10-CM

## 2022-12-01 DIAGNOSIS — E03.4 HYPOTHYROIDISM DUE TO ACQUIRED ATROPHY OF THYROID: ICD-10-CM

## 2022-12-01 DIAGNOSIS — R94.31 ABNORMAL ELECTROCARDIOGRAM: Primary | ICD-10-CM

## 2022-12-01 DIAGNOSIS — R94.31 ABNORMAL ELECTROCARDIOGRAM: ICD-10-CM

## 2022-12-01 DIAGNOSIS — R07.89 CHEST PRESSURE: ICD-10-CM

## 2022-12-01 DIAGNOSIS — R55 NEAR SYNCOPE: ICD-10-CM

## 2022-12-01 LAB
ERYTHROCYTE [DISTWIDTH] IN BLOOD BY AUTOMATED COUNT: 11.8 % (ref 10–15)
HCT VFR BLD AUTO: 47.9 % (ref 35–47)
HGB BLD-MCNC: 16.3 G/DL (ref 11.7–15.7)
HOLD SPECIMEN: NORMAL
HOLD SPECIMEN: NORMAL
MAGNESIUM SERPL-MCNC: 2.3 MG/DL (ref 1.7–2.3)
MCH RBC QN AUTO: 31.3 PG (ref 26.5–33)
MCHC RBC AUTO-ENTMCNC: 34 G/DL (ref 31.5–36.5)
MCV RBC AUTO: 92 FL (ref 78–100)
PLATELET # BLD AUTO: 214 10E3/UL (ref 150–450)
RBC # BLD AUTO: 5.21 10E6/UL (ref 3.8–5.2)
WBC # BLD AUTO: 10.7 10E3/UL (ref 4–11)

## 2022-12-01 PROCEDURE — 99204 OFFICE O/P NEW MOD 45 MIN: CPT | Performed by: NURSE PRACTITIONER

## 2022-12-01 PROCEDURE — 93000 ELECTROCARDIOGRAM COMPLETE: CPT | Performed by: INTERNAL MEDICINE

## 2022-12-01 PROCEDURE — 83735 ASSAY OF MAGNESIUM: CPT | Performed by: NURSE PRACTITIONER

## 2022-12-01 PROCEDURE — 85027 COMPLETE CBC AUTOMATED: CPT | Performed by: NURSE PRACTITIONER

## 2022-12-01 PROCEDURE — 36415 COLL VENOUS BLD VENIPUNCTURE: CPT | Performed by: NURSE PRACTITIONER

## 2022-12-01 RX ORDER — METOPROLOL SUCCINATE 25 MG/1
25 TABLET, EXTENDED RELEASE ORAL AT BEDTIME
Qty: 90 TABLET | Refills: 0 | Status: SHIPPED | OUTPATIENT
Start: 2022-12-01 | End: 2023-01-11

## 2022-12-01 RX ORDER — LEVOTHYROXINE SODIUM 100 UG/1
100 TABLET ORAL DAILY
Qty: 90 TABLET | Refills: 0 | Status: SHIPPED | OUTPATIENT
Start: 2022-12-01 | End: 2023-03-03

## 2022-12-01 ASSESSMENT — PAIN SCALES - GENERAL: PAINLEVEL: MILD PAIN (3)

## 2022-12-01 NOTE — PROGRESS NOTES
"[facility]   CARDIOLOGY CONSULT     Manju Weiss   [address]  [facility] [address]      Zohreh Aguila     Chief Complaint   Patient presents with     [facility]        HPI:   Manju Weiss is a pleasant 37-year old female who presents for cardiology consult. She has no significant cardiac history. She has a non-cardiac history including hypothyroidism, pre-eclampsia, anxiety.       Ms. Weiss tells me that over the last year she has had progressively worsening symptoms of fluttering/skipping sensation in her chest as well as lightheaded, near-syncopal symptoms with exertion such as walking around [employer]. She tells me that she could be having a conversation with her  and she gets symptoms of blurred vision, darkening of vision, lightheaded. Symptoms typically improve in a few seconds but lately seems like it is taking longer. She has not experienced syncope. She does workout- she follows [employer] work-out program doing weight lifting and HIIT program. She tells me that when she is doing these workouts she does not experience symptoms of chest pain, dyspnea, lightheaded. Symptoms seem most prominent at rest- especially at night she is feeling racing/palpitations \"like there is a ticking time bomb in my chest.\"     She does endorse history of syncope as a teenager. She tells me that as a teenager she would avoid eating for extended periods of time. Did not like eating in front of people. Did not like people to know if or how much she was eating. Very thin. She relates her syncopal episodes to this. As an adult she does not feel she has restrictive eating. No purging after eating.     Has pulmonary function testing scheduled in 2023. She does do daily Advair.       History of pre-eclampsia with first child who is now 10 years old ()  History of pre-eclampsia with second child who is now 8 years old ()  History of hypertension during pregnancy with third child " who is now 4-4.5 years old  No history of peripartum cardiomyopathy  IUD for contraception      Smoking History: Started smoking at age 18. Continues with daily smoking. 1/2 ppd.     Alcohol History: None    Substance Abuse History: None    Sleep History: Sleeps in bed on side with 1 pillow. +orthopnea, chest pressure- worse lately. No PND. She does endorse LE edema that seems worse in the morning.     Family History: Mother- lung cancer, CHF- suspecting ischemic cardiomyopathy with LVEF in the low 20%, sister (older, 43 years old)- thyroid cancer, ovarian cancer, factor V Leiden, another sister with TTP      RELEVANT TESTING:  Conclusion  Zio XT patch report on Manju Weiss.  Ordered secondary to palpitations.  Worn for 13 days and 21 hr's.  After removing artifact, total time was 13 days and 19 hr's. Placed on 11/8/22 at 11:31 am and completed on 11/22/22 at 8:38 am.  Underlying rhythm was sinus.   Hrt rate ranged from 44 bpm, maximum heart rate of 207 bmp, averaging 77 bmp.   No significant bradycardia, pauses, Mobitz type II or 3rd degree heart block.   No atrial fibrillation on this study.   x61 triggered events and x18 diary entries.  These corresponded to NSR, Ventricular Bigeminy and Trigeminy, SVEs and VEs.   x29 runs of VT longest lasting 11 beats with a maximum heart rate of 207 bmp.     x0 runs of SVT.   Rare, <1% of PAC's, atrial couplets, atrial triplets.   VEs were frequent at 10.9% ([id number]).   Ventricular bigeminy lasting up to 10 mins 33 sec's.   Ventricular trigeminy lasting up to 16 mins 16 sec's.  Diaz Cardoso,          PAST MEDICAL HISTORY:   Past Medical History:   Diagnosis Date     Anxiety      Depressive disorder      Migraines      Uncomplicated asthma           FAMILY HISTORY:   Family History   Problem Relation Age of Onset     Asthma Mother      Mental Illness Mother      Cancer Mother      Lung Cancer Mother      Factor IX deficiency Mother      Alcoholism Father       Obesity Sister      Thyroid Cancer Sister      Uterine Cancer Sister      Factor IX deficiency Sister      Ovarian Cancer Maternal Grandmother      Diabetes Paternal Grandmother      Coronary Artery Disease Paternal Grandmother      Heart Disease Paternal Grandmother      Hypertension Paternal Grandmother      Hyperlipidemia Paternal Grandmother      Kidney Disease Paternal Grandmother      Cerebrovascular Disease Paternal Grandmother      Parkinsonism Paternal Grandfather      Diabetes Sister      Factor IX deficiency Sister           PAST SURGICAL HISTORY:   Past Surgical History:   Procedure Laterality Date     COLONOSCOPY  2013     TONSILLECTOMY  2001          SOCIAL HISTORY:   Social History     Socioeconomic History     Marital status:    Tobacco Use     Smoking status: Every Day     Packs/day: 0.50     Years: 17.00     Pack years: 8.50     Types: Cigarettes     Smokeless tobacco: Never   Substance and Sexual Activity     Alcohol use: Yes     Drug use: Never     Sexual activity: Yes     Partners: Male     Birth control/protection: Implant          CURRENT MEDICATIONS:   Current Outpatient Medications   Medication     albuterol (PROAIR HFA/PROVENTIL HFA/VENTOLIN HFA) 108 (90 Base) MCG/ACT inhaler     APPLE CIDER VINEGAR PO     fluticasone-salmeterol (ADVAIR) 100-50 MCG/ACT inhaler     levonorgestrel (MIRENA) 20 MCG/24HR IUD     levothyroxine (SYNTHROID/LEVOTHROID) 100 MCG tablet     metoprolol succinate ER (TOPROL XL) 25 MG 24 hr tablet     multivitamin w/minerals (THERA-VIT-M) tablet     No current facility-administered medications for this visit.            ALLERGIES:   Allergies   Allergen Reactions     Sulfa Drugs Shortness Of Breath     Latex Hives, Itching and Rash          ROS:   CONSTITUTIONAL: No reported fever or chills. No changes in weight.  ENT: No visual disturbance, ear ache, epistaxis or sore throat.   CARDIOVASCULAR: +chest pressure, palpitations (See HPI).   RESPIRATORY: +dyspnea (See  HPI). No cough, wheezing or hemoptysis.   GI: No abdominal pain. No nausea, vomiting or diarrhea. No melena or hematochezia. No changes in bowel habits.   : No hematuria or dysuria. No hesitancy or incontinence.   NEUROLOGICAL: +lightheadedness, dizziness, near syncope (See HPI). No headache, syncope, ataxia, paresthesias or weakness.   HEMATOLOGIC: No bleeding or excessive bruising. No history of blood clots.   MUSCULOSKELETAL: No joint pain or swelling, no muscle pain.  ENDOCRINOLOGIC: No temperature intolerance. No hair or skin changes.  SKIN: No abnormal rashes or sores, no unusual itching.  PSYCHIATRIC:  No changes in mood, feeling down or anxious. No changes in sleep.      PHYSICAL EXAM:     Vitals: /68 (BP Location: Right arm, Patient Position: Sitting, Cuff Size: Adult Regular)   Pulse 85   Temp 97  F (36.1  C) (Tympanic)   Resp 19   Wt 83.8 kg (184 lb 12.8 oz)   SpO2 97%   BMI 29.38 kg/m    BMI= Body mass index is 29.38 kg/m .    GENERAL: The patient is a well-developed, well-nourished, in no apparent distress.  HEENT: Head is normocephalic and atraumatic. Eyes are symmetrical with normal visual tracking. No icterus, no xanthelasmas.  NECK: Supple. No adenopathy, asymmetry or masses. Carotid upstroke are normal bilaterally, no cervical bruits, JVP not visible.   CHEST/ LUNGS: Lungs: no rales, rhonchi. +wheezing in LLL, no use of accessory muscles, no retractions, respirations unlabored and normal respiratory rate.   CARDIO: Regularly, irregular rhythm, normal rate. normal with S1 and S2, no S3 or S4 and no murmur, click or rub. Precordium quiet with normal PMI.    ABD: Abdomen is soft and nontender, nondistended. no palpable masses, no abdominal bruits heard.   EXTREMITIES: No clubbing, cyanosis or edema present. Peripheral pulses normal and equal bilaterally.  VASC: Radial, femoral, dorsalis pedis and posterior tibialis pulses normal and symmetric with no vascular bruits  heard.  MUSCULOSKELETAL: No joint swelling.   NEUROLOGIC: Alert and oriented X3. Normal speech, gait and affect. No focal neurologic deficits.   SKIN: No jaundice. No rashes or visible skin lesions present. No ecchymosis.            EKG Interpretation:      Rhythm: Normal sinus   Rate: Normal  Axis: Normal  Ectopy: Bigeminy  Conduction: normal DESIRE 140 ms, normal QRS duration 92 ms, normal  ms, normal QTc 471 ms  ST Segments/ T Waves: No acute ischemic changes  Q Waves: None  Comparison to prior: Unchanged from 11/7/2022 EKG      LAB RESULTS:   Orders placed or performed in visit on 12/01/22     levothyroxine (SYNTHROID/LEVOTHROID) 100 MCG tablet     metoprolol succinate ER (TOPROL XL) 25 MG 24 hr tablet          ASSESSMENT:   Manju Weiss is a pleasant 37-year old female who presents for cardiology consult. She has no significant cardiac history. She has a non-cardiac history including hypothyroidism, pre-eclampsia, anxiety.       Over the last year Mrs. Weiss has been having increased palpitations with progressively worsening symptoms of lightheaded, dizziness, dyspnea, chest pressure. Her zio patch showed 29 episodes of ventricular tachycardia, frequent PVCs with burden of 10.9%- +bigeminy, trigeminy.       PLAN:   1. Ventricular tachycardia  2. Bigeminy (Symptomatic)  3. Frequent PVCs (symptomatic)    Start metoprolol XL 25 mg once daily.     If improvement but ongoing symptoms in 7-10 days she will update us and plan to increase to 50 mg once daily.     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 Reviewed side effects of beta-blockers and cardioprotection benefit if related to underlying heart failure, coronary disease, etc. If she tolerates without fatigue we will continue. If there is ongoing significant fatigue we could consider switching to calcium channel blocker such as diltiazem pending results of upcoming TTE, stress testing    Plan for transthoracic echocardiogram to  evaluate heart structure and function    Plan for stress testing to evaluate ischemia as potential etiology for ventricular tachycardia, frequent PVCs. She is having symptoms of chest pressure, dyspnea, near-syncope with exertion and frequent palpitations.     Discussed referral to see Dr. Stafford, electrophysiologist, once work-up complete    Electrolytes including potassium and magnesium are normal. No anemia.       4. Hypothyroidism    Elevated TSH, normal free T4. She is wanting to try increased dose to see if she feels less symptoms of hypothyroidism: fatigue, skin crawling    Increase levothyroxine 75 mcg once daily to levothyroxine 100 mcg once daily    Re-check TSH in 6-8 weeks      Follow-up after completion of testing      Thank you for allowing me to participate in the care of your patient. Please do not hesitate to contact me if you have any questions.     Perla Renae, CNP

## 2022-12-01 NOTE — PATIENT INSTRUCTIONS
Thank you for allowing Perla Renae CNP and our  team to participate in your care. Please call our office at [phone] with scheduling questions or if you need to cancel or change your appointment. With any other questions or concerns you may call cardiology nurse at [phone].       If you experience chest pain, chest pressure, chest tightness, shortness of breath, fainting, lightheadedness, nausea, vomiting, or other concerning symptoms, please report to the Emergency Department or call 911. These symptoms may be emergent, and best treated in the Emergency Department.      Start metoprolol XL 25 mg every evening.   After 7-10 days, If you notice improvement but still having some symptoms-- we can increase to 50 mg once daily.   You should receive a call to schedule exercise stress test- call us when you get this scheduled so we can get you on [place] schedule  Keep upcoming appointment for transthoracic echocardiogram   Increase levothyroxine to 100 mcg daily.   Recheck TSH in 6-8 weeks      Follow-up at [facility] after completion of testing.     Perla Renae CNP

## 2022-12-08 ENCOUNTER — HOSPITAL ENCOUNTER (OUTPATIENT)
Dept: CARDIOLOGY | Facility: HOSPITAL | Age: 37
Discharge: HOME | End: 2022-12-08
Attending: NURSE PRACTITIONER | Admitting: INTERNAL MEDICINE
Payer: COMMERCIAL

## 2022-12-08 DIAGNOSIS — I49.3 FREQUENT PVCS: ICD-10-CM

## 2022-12-08 DIAGNOSIS — R07.89 CHEST PRESSURE: ICD-10-CM

## 2022-12-08 DIAGNOSIS — I47.29 PAROXYSMAL VENTRICULAR TACHYCARDIA (H): ICD-10-CM

## 2022-12-08 DIAGNOSIS — R55 NEAR SYNCOPE: ICD-10-CM

## 2022-12-08 LAB — LVEF ECHO: NORMAL

## 2022-12-08 PROCEDURE — 93306 TTE W/DOPPLER COMPLETE: CPT

## 2022-12-08 PROCEDURE — 999N000208 ECHOCARDIOGRAM COMPLETE

## 2022-12-08 PROCEDURE — 93306 TTE W/DOPPLER COMPLETE: CPT | Mod: 26 | Performed by: INTERNAL MEDICINE

## 2022-12-09 DIAGNOSIS — R00.2 PALPITATIONS: ICD-10-CM

## 2022-12-09 DIAGNOSIS — I49.3 FREQUENT PVCS: ICD-10-CM

## 2022-12-09 DIAGNOSIS — R07.89 CHEST PRESSURE: ICD-10-CM

## 2022-12-09 DIAGNOSIS — I47.29 PAROXYSMAL VENTRICULAR TACHYCARDIA (H): Primary | ICD-10-CM

## 2023-01-05 ENCOUNTER — HOSPITAL ENCOUNTER (OUTPATIENT)
Dept: CARDIOLOGY | Facility: HOSPITAL | Age: 38
Setting detail: NUCLEAR MEDICINE
Discharge: HOME | End: 2023-01-05
Attending: NURSE PRACTITIONER
Payer: COMMERCIAL

## 2023-01-05 ENCOUNTER — HOSPITAL ENCOUNTER (OUTPATIENT)
Dept: NUCLEAR MEDICINE | Facility: HOSPITAL | Age: 38
Setting detail: NUCLEAR MEDICINE
Discharge: HOME | End: 2023-01-05
Attending: NURSE PRACTITIONER
Payer: COMMERCIAL

## 2023-01-05 DIAGNOSIS — R07.89 CHEST PRESSURE: ICD-10-CM

## 2023-01-05 DIAGNOSIS — I47.29 PAROXYSMAL VENTRICULAR TACHYCARDIA (H): ICD-10-CM

## 2023-01-05 DIAGNOSIS — I49.3 FREQUENT PVCS: ICD-10-CM

## 2023-01-05 DIAGNOSIS — R55 NEAR SYNCOPE: ICD-10-CM

## 2023-01-05 PROCEDURE — 258N000003 HC RX IP 258 OP 636: Performed by: INTERNAL MEDICINE

## 2023-01-05 PROCEDURE — 93016 CV STRESS TEST SUPVJ ONLY: CPT | Performed by: INTERNAL MEDICINE

## 2023-01-05 PROCEDURE — 93018 CV STRESS TEST I&R ONLY: CPT | Performed by: INTERNAL MEDICINE

## 2023-01-05 PROCEDURE — 343N000001 HC RX 343: Performed by: RADIOLOGY

## 2023-01-05 PROCEDURE — A9500 TC99M SESTAMIBI: HCPCS | Performed by: RADIOLOGY

## 2023-01-05 PROCEDURE — 93017 CV STRESS TEST TRACING ONLY: CPT

## 2023-01-05 PROCEDURE — 78452 HT MUSCLE IMAGE SPECT MULT: CPT

## 2023-01-05 RX ORDER — SODIUM CHLORIDE 9 MG/ML
INJECTION, SOLUTION INTRAVENOUS ONCE
Status: COMPLETED | OUTPATIENT
Start: 2023-01-05 | End: 2023-01-05

## 2023-01-05 RX ADMIN — SODIUM CHLORIDE: 9 INJECTION, SOLUTION INTRAVENOUS at 08:39

## 2023-01-05 RX ADMIN — Medication 10.8 MILLICURIE: at 06:45

## 2023-01-05 RX ADMIN — Medication 32 MILLICURIE: at 08:56

## 2023-01-09 LAB
CV BLOOD PRESSURE: 51 MMHG
CV STRESS MAX HR HE: 153
NUC STRESS EJECTION FRACTION: 58 %
RATE PRESSURE PRODUCT: NORMAL
STRESS ECHO BASELINE DIASTOLIC HE: 76
STRESS ECHO BASELINE HR: 65 BPM
STRESS ECHO BASELINE SYSTOLIC BP: 126
STRESS ECHO CALCULATED PERCENT HR: 83 %
STRESS ECHO LAST STRESS DIASTOLIC BP: 94
STRESS ECHO LAST STRESS SYSTOLIC BP: 188
STRESS ECHO POST ESTIMATED WORKLOAD: 10.6 METS
STRESS ECHO POST EXERCISE DUR MIN: 9 MIN
STRESS ECHO POST EXERCISE DUR SEC: 15 SEC
STRESS ECHO TARGET HR: 185

## 2023-01-11 ENCOUNTER — OFFICE VISIT (OUTPATIENT)
Dept: CARDIOLOGY | Facility: OTHER | Age: 38
End: 2023-01-11
Attending: NURSE PRACTITIONER
Payer: COMMERCIAL

## 2023-01-11 VITALS
RESPIRATION RATE: 16 BRPM | BODY MASS INDEX: 29.44 KG/M2 | HEIGHT: 67 IN | WEIGHT: 187.6 LBS | SYSTOLIC BLOOD PRESSURE: 120 MMHG | HEART RATE: 73 BPM | DIASTOLIC BLOOD PRESSURE: 72 MMHG | OXYGEN SATURATION: 96 % | TEMPERATURE: 98.4 F

## 2023-01-11 DIAGNOSIS — I47.29 PAROXYSMAL VENTRICULAR TACHYCARDIA (H): Primary | ICD-10-CM

## 2023-01-11 DIAGNOSIS — R00.2 PALPITATIONS: ICD-10-CM

## 2023-01-11 DIAGNOSIS — I51.7 LEFT VENTRICULAR DILATATION: ICD-10-CM

## 2023-01-11 DIAGNOSIS — E03.4 HYPOTHYROIDISM DUE TO ACQUIRED ATROPHY OF THYROID: ICD-10-CM

## 2023-01-11 DIAGNOSIS — I49.3 FREQUENT PVCS: ICD-10-CM

## 2023-01-11 LAB — TSH SERPL DL<=0.005 MIU/L-ACNC: 3.09 UIU/ML (ref 0.3–4.2)

## 2023-01-11 PROCEDURE — 99214 OFFICE O/P EST MOD 30 MIN: CPT | Mod: 25 | Performed by: NURSE PRACTITIONER

## 2023-01-11 PROCEDURE — 36415 COLL VENOUS BLD VENIPUNCTURE: CPT | Performed by: NURSE PRACTITIONER

## 2023-01-11 PROCEDURE — 84443 ASSAY THYROID STIM HORMONE: CPT | Performed by: NURSE PRACTITIONER

## 2023-01-11 RX ORDER — METOPROLOL SUCCINATE 50 MG/1
50 TABLET, EXTENDED RELEASE ORAL AT BEDTIME
Qty: 90 TABLET | Refills: 1 | Status: SHIPPED | OUTPATIENT
Start: 2023-01-11 | End: 2023-08-03

## 2023-01-11 ASSESSMENT — PAIN SCALES - GENERAL: PAINLEVEL: SEVERE PAIN (6)

## 2023-01-11 NOTE — PATIENT INSTRUCTIONS
Thank you for allowing Perla Renae CNP and our  team to participate in your care. Please call our office at [phone] with scheduling questions or if you need to cancel or change your appointment. With any other questions or concerns you may call cardiology nurse at [phone] or [phone].       If you experience chest pain, chest pressure, chest tightness, shortness of breath, fainting, lightheadedness, nausea, vomiting, or other concerning symptoms, please report to the Emergency Department or call 911. These symptoms may be emergent, and best treated in the Emergency Department.        Electrophysiologist on 1/20/2022 at 8:45 pm to discuss ventricular tachycardia, premature ventricular contractions  Increase metoprolol XL from 25 mg once daily to 50 mg once daily (take between 6-8pm to see if you feel better with morning workouts)  You should receive a call from [facility] to schedule cardiac MR  TSH re-check today    Perla Renae CNP

## 2023-01-11 NOTE — PROGRESS NOTES
[facility] HEART CARE   CARDIOLOGY PROGRESS NOTE     Chief Complaint   Patient presents with     Heart Problem     Discuss stress test.          Diagnosis:    ICD-10-CM    1. Paroxysmal ventricular tachycardia  I47.29 MRI Cardiac w/contrast and flow     metoprolol succinate ER (TOPROL XL) 50 MG 24 hr tablet      2. Frequent PVCs  I49.3 MRI Cardiac w/contrast and flow     metoprolol succinate ER (TOPROL XL) 50 MG 24 hr tablet      3. Palpitations  R00.2 MRI Cardiac w/contrast and flow      4. Left ventricular dilatation (TTE 11/2022)  I51.7 MRI Cardiac w/contrast and flow      5. Hypothyroidism due to acquired atrophy of thyroid  E03.4 TSH with free T4 reflex            Assessment/Plan:    1. Ventricular tachycardia  2. Bigeminy (Symptomatic)  3. Frequent PVCs (symptomatic)    Continue metoprolol XL    Tolerating metoprolol XL 25 mg every evening which has been somewhat helpful. She has noticed some decreased energy, decreased activity tolerance with her morning exercise as well as weight gain while being cognizant with diet and exercise.    Ongoing symptoms, she will increase metoprolol XL 25 mg once daily to metoprolol XL 50 mg once daily and try taking around 6-8pm verus 10-11 pm and see if there are changes in how she feels during morning workout    Transthoracic echocardiogram     Mild LV dilation with normal LVEF of 55-60%    Atrial septal aneurysm- no history of stroke, clotting disorder    NM MPI Treadmill    No reversible ischemia    Enlarged LV with diffuse hypokinesis    No exercise induced arrhythmia     Electrolytes including potassium and magnesium are normal. No anemia. TSH is normal    She will see EP Dr. Stafford via telemed consult on 1/20/2022      4. Left ventricular dilatation (TTE 11/2022)    Mild on TTE although measurements seem to be in normal range    IVSd: 1.1 cm    LVIDd: 5.5 cm    LVIDs: 3.8 cm    NM Lexiscan- enlarged LV with diffuse hypokineses    Reviewed potential etiology of dilated  cardiomyopathy including genetic, ischemic, viral illness, infiltrating disease, alcohol, etc.     No known family history of early heart disease, cardiomyopathy other than mom recently diagnosed with lung cancer and subsequent heart failure    NM Lexiscan negative for reversible ischemia    No acute illness precipitating onset of symptoms    No known sarcoidosis, amyloidosis, autoimmune disease    No history of alcohol abuse    Reviewed option of repeating echocardiogram in  6 months, sooner if clinically indicated versus proceeding with cardiac MRI to further evaluate for potential scar, infiltrating disease, etc. She elects to proceed with cardiac MRI and prefers this be done at [facility] due to proximity closer to home.       5. Hypothyroidism    TSH normal today    Continue levothyroxine 100 mcg once daily      Follow-up in 6 months, certainly sooner with acute concerns.       Interval history:  Mrs. Weiss is a pleasant 37-year old female who presents for cardiology follow-up. She has cardiac history including borderline LV dilation, ventricular tachycardia, premature ventricular contractions. She has a non-cardiac history including hypothyroidism, pre-eclampsia, anxiety.       Mrs. Weiss tells me that since starting the metoprolol XL 25 mg once daily her symptoms of palpitations, racing in her chest have improved but have not resolved. She has overall been tolerating metoprolol but wonders if extreme pain in her fingers could be a side effect of medication. No color change, not sensitive to cold or hot temperature changes, no ulcerations/wounds. She also tells me she has noticed some difficulty with getting through her workouts due to fatigue. She has not had any chest pain, no dyspnea, dyspnea on exertion out of proportion for the activity she is doing. No LE edema. She has not had any pre-syncopal or syncopal events.         HPI:    Manju Weiss is a pleasant 37-year old female who  "presents for cardiology consult. She has no significant cardiac history. She has a non-cardiac history including hypothyroidism, pre-eclampsia, anxiety.       Ms. Weiss tells me that over the last year she has had progressively worsening symptoms of fluttering/skipping sensation in her chest as well as lightheaded, near-syncopal symptoms with exertion such as walking around [employer]. She tells me that she could be having a conversation with her  and she gets symptoms of blurred vision, darkening of vision, lightheaded. Symptoms typically improve in a few seconds but lately seems like it is taking longer. She has not experienced syncope. She does workout- she follows [employer] work-out program doing weight lifting and HIIT program. She tells me that when she is doing these workouts she does not experience symptoms of chest pain, dyspnea, lightheaded. Symptoms seem most prominent at rest- especially at night she is feeling racing/palpitations \"like there is a ticking time bomb in my chest.\"     She does endorse history of syncope as a teenager. She tells me that as a teenager she would avoid eating for extended periods of time. Did not like eating in front of people. Did not like people to know if or how much she was eating. Very thin. She relates her syncopal episodes to this. As an adult she does not feel she has restrictive eating. No purging after eating.     Has pulmonary function testing scheduled in 2023. She does do daily Advair.       History of pre-eclampsia with first child who is now 10 years old ()  History of pre-eclampsia with second child who is now 8 years old ()  History of hypertension during pregnancy with third child who is now 4-4.5 years old  No history of peripartum cardiomyopathy  IUD for contraception      Smoking History: Started smoking at age 18. Continues with daily smoking. 1/2 ppd.     Alcohol History: None    Substance Abuse History: None    Sleep History: " Sleeps in bed on side with 1 pillow. +orthopnea, chest pressure- worse lately. No PND. She does endorse LE edema that seems worse in the morning.     Family History: Mother- lung cancer, CHF- suspecting ischemic cardiomyopathy with LVEF in the low 20%, sister (older, 43 years old)- thyroid cancer, ovarian cancer, factor V Leiden, another sister with TTP      RELEVANT TESTING:  Leadless EKG Monitor 11/2022  Conclusion  Zio XT patch report on Manju Weiss.  Ordered secondary to palpitations.  Worn for 13 days and 21 hr's.  After removing artifact, total time was 13 days and 19 hr's. Placed on 11/8/22 at 11:31 am and completed on 11/22/22 at 8:38 am.  Underlying rhythm was sinus.   Hrt rate ranged from 44 bpm, maximum heart rate of 207 bmp, averaging 77 bmp.   No significant bradycardia, pauses, Mobitz type II or 3rd degree heart block.   No atrial fibrillation on this study.   x61 triggered events and x18 diary entries.  These corresponded to NSR, Ventricular Bigeminy and Trigeminy, SVEs and VEs.   x29 runs of VT longest lasting 11 beats with a maximum heart rate of 207 bmp.     x0 runs of SVT.   Rare, <1% of PAC's, atrial couplets, atrial triplets.   VEs were frequent at 10.9% ([id number]).   Ventricular bigeminy lasting up to 10 mins 33 sec's.   Ventricular trigeminy lasting up to 16 mins 16 sec's.  Diaz Cardoso, DO      Transthoracic Echocardiogram 12/2022  Interpretation Summary  Global and regional left ventricular function is normal with an EF of 55-60%.  Mild left ventricular dilation is present.  IVSd: 1.1 cm  LVIDd: 5.5 cm  LVIDs: 3.8 cm  Right ventricular function, chamber size, wall motion, and thickness are  normal.  Mild to moderate left atrial enlargement is present.  The atrial septum is intact as assessed by color Doppler and agitated saline  bubble study. Mobile atrial septum c/w atrial aneurysm.  Trace to mild mitral insufficiency is present.  Trace tricuspid insufficiency is present.  Trace  pulmonic insufficiency is present.      NM MPI Treadmill 1/2023     The nuclear stress test is abnormal.     The left ventricular ejection fraction at rest is 51%.  The left ventricular ejection fraction at stress is 58%.     There is no prior study for comparison.     enlarged left ventricle with global mild hypokinesis.  No reversible ischemia    ECG Baseline electrocardiogram demonstrates sinus rhythm. There were occasional premature ventricular contractions, .   The stress electrocardiogram is negative for inducible ischemic EKG changes.  There are just some nonspecific ST-T changes noted. There were no arrhythmias during recovery.             Past Medical History:   Diagnosis Date     Anxiety      Depressive disorder      Migraines      Uncomplicated asthma        Past Surgical History:   Procedure Laterality Date     COLONOSCOPY  2013     TONSILLECTOMY  2001       Allergies   Allergen Reactions     Sulfa Drugs Shortness Of Breath     Latex Hives, Itching and Rash       Current Outpatient Medications   Medication Sig Dispense Refill     albuterol (PROAIR HFA/PROVENTIL HFA/VENTOLIN HFA) 108 (90 Base) MCG/ACT inhaler Inhale 2 puffs into the lungs every 4 hours as needed for shortness of breath / dyspnea or wheezing 18 g 1     APPLE CIDER VINEGAR PO        fluticasone-salmeterol (ADVAIR) 100-50 MCG/ACT inhaler Inhale 1 puff into the lungs every 12 hours 60 each 3     levonorgestrel (MIRENA) 20 MCG/24HR IUD 1 each by Intrauterine route once       levothyroxine (SYNTHROID/LEVOTHROID) 100 MCG tablet Take 1 tablet (100 mcg) by mouth daily for 90 days 90 tablet 0     metoprolol succinate ER (TOPROL XL) 50 MG 24 hr tablet Take 1 tablet (50 mg) by mouth At Bedtime for 90 days 90 tablet 1     multivitamin w/minerals (THERA-VIT-M) tablet Take 1 tablet by mouth daily         Social History     Socioeconomic History     Marital status:      Spouse name: Not on file     Number of children: Not on file     Years of  "education: Not on file     Highest education level: Not on file   Occupational History     Not on file   Tobacco Use     Smoking status: Every Day     Packs/day: 0.50     Years: 17.00     Pack years: 8.50     Types: Cigarettes     Smokeless tobacco: Never   Substance and Sexual Activity     Alcohol use: Yes     Drug use: Never     Sexual activity: Yes     Partners: Male     Birth control/protection: Implant   Other Topics Concern     Not on file   Social History Narrative     Not on file     Social Determinants of Health     Financial Resource Strain: Not on file   Food Insecurity: Not on file   Transportation Needs: Not on file   Physical Activity: Not on file   Stress: Not on file   Social Connections: Not on file   Intimate Partner Violence: Not on file   Housing Stability: Not on file       LAB RESULTS:   Orders placed or performed in visit on 01/11/23     metoprolol succinate ER (TOPROL XL) 50 MG 24 hr tablet         Review of systems: Negative except that which was noted in the HPI.    Physical examination:    Vitals: /72   Pulse 73   Temp 98.4  F (36.9  C) (Tympanic)   Resp 16   Ht 1.689 m (5' 6.5\")   Wt 85.1 kg (187 lb 9.6 oz)   SpO2 96%   BMI 29.83 kg/m    BMI= Body mass index is 29.83 kg/m .      GENERAL APPEARANCE: healthy, alert and no distress  HEENT: no icterus, no xanthelasmas, normal pupil size and reaction, no cyanosis.  NECK: no adenopathy, no asymmetry, masses.  CHEST: lungs clear to auscultation - no rales, rhonchi or wheezes, no use of accessory muscles, no retractions, respirations are unlabored, normal respiratory rate  CARDIOVASCULAR: regular rhythm, normal S1 with physiologic split S2, no S3 or S4 and no murmur, click or rub  EXTREMITIES: no clubbing, cyanosis or edema  NEURO: alert and oriented normal speech, and affect  VASC: No vascular bruits heard.  SKIN: no ecchymoses, no rashes        Thank you for allowing me to participate in the care of your patient. Please do not " hesitate to contact me if you have any questions.       Perla Renae, CNP

## 2023-01-20 ENCOUNTER — VIRTUAL VISIT (OUTPATIENT)
Dept: CARDIOLOGY | Facility: OTHER | Age: 38
End: 2023-01-20
Attending: INTERNAL MEDICINE
Payer: COMMERCIAL

## 2023-01-20 ENCOUNTER — VIRTUAL VISIT (OUTPATIENT)
Dept: CARDIOLOGY | Facility: CLINIC | Age: 38
End: 2023-01-20
Attending: INTERNAL MEDICINE
Payer: COMMERCIAL

## 2023-01-20 ENCOUNTER — PATIENT OUTREACH (OUTPATIENT)
Dept: CARE COORDINATION | Facility: OTHER | Age: 38
End: 2023-01-20

## 2023-01-20 VITALS
TEMPERATURE: 98.4 F | DIASTOLIC BLOOD PRESSURE: 67 MMHG | SYSTOLIC BLOOD PRESSURE: 120 MMHG | OXYGEN SATURATION: 96 % | RESPIRATION RATE: 17 BRPM | HEART RATE: 70 BPM

## 2023-01-20 DIAGNOSIS — I49.3 PVC'S (PREMATURE VENTRICULAR CONTRACTIONS): Primary | ICD-10-CM

## 2023-01-20 DIAGNOSIS — I47.29 PAROXYSMAL VENTRICULAR TACHYCARDIA (H): ICD-10-CM

## 2023-01-20 DIAGNOSIS — I49.3 PVC'S (PREMATURE VENTRICULAR CONTRACTIONS): ICD-10-CM

## 2023-01-20 DIAGNOSIS — I49.3 FREQUENT PVCS: Primary | ICD-10-CM

## 2023-01-20 DIAGNOSIS — R94.31 ABNORMAL ELECTROCARDIOGRAM: ICD-10-CM

## 2023-01-20 PROCEDURE — 99214 OFFICE O/P EST MOD 30 MIN: CPT | Mod: 95 | Performed by: INTERNAL MEDICINE

## 2023-01-20 ASSESSMENT — PAIN SCALES - GENERAL: PAINLEVEL: MODERATE PAIN (4)

## 2023-01-20 NOTE — PROGRESS NOTES
ELECTROPHYSIOLOGY TELEMEDICINE CLINIC VISIT    Assessment/Recommendations   Assessment/Plan:    Ms. Weiss is a 37 year old female who has a past medical history significant for PVCs/NSVT, borderline LV dilation, hypothyroidism, pre-eclampsia, and anxiety.     Premature Ventricular Contractions:   We had a long discussion with patient regarding PVCs. We discussed various options for treatment of PVCs. In a structurally normal heart, PVCs are considered relatively benign. Treatment is therefore indicated primarily for relief of symptoms or if associated with a cardiomyopathy. She does have some mild LV dilation and we agree that this should be further investigated with CMR. If CMR is normal, then treatment is for symptoms only. If CMR shows a CM, then suppression of PVCs maybe helpful in managing a CM. Medications such as calcium channel blockers or beta blockers in some cases reduce the frequency and duration of the episodes. She is on Toprol XL which has helped some with symptoms. The other option discussed was an electrophysiology study (EPS) and possible ablation. The procedural risk of EPS and PVC ablation were discussed in detail. Risks discussed include: vascular complications, CVA, AVB, pericardial effusion and tamponade. We also discussed that if PVC burden is somewhat variable, it is possible that there would be sufficient amount of PVCs during procedure to allow for adequate mapping. We also discussed that at times the PVCs will be traced to an origin in the heart where it is not safe to proceed with ablation. She also asked about her thyroid and if that could be contributing. We recommended she talk to an endocrinologist, but her TSH have been in goal/normal ranges as of late. We will awaiting the CMR results to determine next steps.       Follow up to be determined based on results.        History of Present Illness/Subjective    Ms. Manju Weiss is a 37 year old female who comes in today for  EP consultation of PVCs.    Ms. Weiss is a 37 year old female who has a past medical history significant for PVCs/NSVT, borderline LV dilation, hypothyroidism, pre-eclampsia, and anxiety.     She was recently seen by Cardiology for palpitations that she reports as fluttering/skipping sensation in her chest sometimes with lightheadedness/presyncope. Symptoms are most prominent at rest. She does have a history of syncope as a teenager during a time when she had an eating disorder. She had an echo in 12/2022 which showed mild LV dilation, LVEF 55-60%, normal RV size/function, and mild/modearte LA enlargement. She had a NM stress test in 1/2023 that was negative for ischemia and showed LV enlargement, mild global hypokinesis, and LVEF 51%. A zio patch monitor from 11/8/22-11/22/22 showed 29 NSVT up to 11 beats and 10.9% PVC burden (one dominant morphology) and 61 symptom activations mostly corresponded to PVCs. She was placed on Toprol XL and referred to EP for PVC evaluation and management.     She reports feeling at baseline. She continues to have some PVC symptoms. Feels Metoprolol has helped some. She denies chest discomfort, abdominal fullness/bloating or peripheral edema, shortness of breath, paroxysmal nocturnal dyspnea, orthopnea, or syncope. No known family history of SCD or early heart disease. Her mother was diagnosed with lung cancer and subsequent HF recently. She has been ordered for a CMR which has not been completed yet. TSH was recently checked and normal. She denies any significant ETOH or caffeine intake. She is worried about the dilated LV on imaging and implications of PVCs on her heart. Presenting 12 lead ECG shows NSR Vent Rate 65 bpm,  ms,  ms, QTc 438 ms.  Current cardiac medications include: Toprol XL.       I have reviewed and updated the patient's Past Medical History, Social History, Family History and Medication List.     Cardiographics (Personally Reviewed) :   12/8/22 Echo:    Interpretation Summary  Global and regional left ventricular function is normal with an EF of 55-60%.  Mild left ventricular dilation is present.  Right ventricular function, chamber size, wall motion, and thickness are  normal.  Mild to moderate left atrial enlargement is present.  The atrial septum is intact as assessed by color Doppler and agitated saline  bubble study .  Trace to mild mitral insufficiency is present.  Trace tricuspid insufficiency is present.  Trace pulmonic insufficiency is present.    1/5/23 NM Stress Test:      The nuclear stress test is abnormal.     The left ventricular ejection fraction at rest is 51%.  The left ventricular ejection fraction at stress is 58%.     There is no prior study for comparison.     enlarged left ventricle with global mild hypokinesis.  No reversible ischemia          Physical Examination   There were no vitals taken for this visit.    /67 (BP Location: Right arm, Patient Position: Sitting, Cuff Size: Adult Regular)   Pulse 70   Temp 98.4  F (36.9  C) (Tympanic)   Resp 17   SpO2 96%  Wt Readings from Last 3 Encounters:   01/11/23 85.1 kg (187 lb 9.6 oz)   12/01/22 83.8 kg (184 lb 12.8 oz)   11/07/22 83 kg (183 lb)            Medications  Allergies   Current Outpatient Medications   Medication Sig Dispense Refill     albuterol (PROAIR HFA/PROVENTIL HFA/VENTOLIN HFA) 108 (90 Base) MCG/ACT inhaler Inhale 2 puffs into the lungs every 4 hours as needed for shortness of breath / dyspnea or wheezing 18 g 1     APPLE CIDER VINEGAR PO        fluticasone-salmeterol (ADVAIR) 100-50 MCG/ACT inhaler Inhale 1 puff into the lungs every 12 hours 60 each 3     levonorgestrel (MIRENA) 20 MCG/24HR IUD 1 each by Intrauterine route once       levothyroxine (SYNTHROID/LEVOTHROID) 100 MCG tablet Take 1 tablet (100 mcg) by mouth daily for 90 days 90 tablet 0     metoprolol succinate ER (TOPROL XL) 50 MG 24 hr tablet Take 1 tablet (50 mg) by mouth At Bedtime for 90 days 90 tablet 1      multivitamin w/minerals (THERA-VIT-M) tablet Take 1 tablet by mouth daily      Allergies   Allergen Reactions     Sulfa Drugs Shortness Of Breath     Latex Hives, Itching and Rash         Lab Results (Personally Reviewed)    Chemistry/lipid CBC Cardiac Enzymes/BNP/TSH/INR   Lab Results   Component Value Date    BUN 12.7 11/07/2022     11/07/2022    CO2 23 11/07/2022     Creatinine   Date Value Ref Range Status   11/07/2022 0.69 0.51 - 0.95 mg/dL Final   03/08/2021 0.72 0.52 - 1.04 mg/dL Final       Lab Results   Component Value Date    CHOL 132 03/08/2021    HDL 36 (L) 03/08/2021    LDL 82 03/08/2021      Lab Results   Component Value Date    WBC 10.7 12/01/2022    HGB 16.3 (H) 12/01/2022    HCT 47.9 (H) 12/01/2022    MCV 92 12/01/2022     12/01/2022    Lab Results   Component Value Date    TSH 3.09 01/11/2023        The patient states understanding and is agreeable with the plan.   [facility] Stafford [facility]  [facility] - Electrophysiology      Total time spent on patient visit, reviewing notes, imaging, labs, orders, and completing necessary documentation: 60 minutes.

## 2023-01-20 NOTE — LETTER
1/20/2023      RE: Manju Weiss  [address]  [facility] [address]       Dear Colleague,    Thank you for the opportunity to participate in the care of your patient, Manju Weiss, at the [facility] [place] at [facility]. Please see a copy of my visit note below.        ELECTROPHYSIOLOGY TELEMEDICINE CLINIC VISIT    Assessment/Recommendations   Assessment/Plan:    Ms. Weiss is a 37 year old female who has a past medical history significant for PVCs/NSVT, borderline LV dilation, hypothyroidism, pre-eclampsia, and anxiety.     Premature Ventricular Contractions:   We had a long discussion with patient regarding PVCs. We discussed various options for treatment of PVCs. In a structurally normal heart, PVCs are considered relatively benign. Treatment is therefore indicated primarily for relief of symptoms or if associated with a cardiomyopathy. She does have some mild LV dilation and we agree that this should be further investigated with CMR. If CMR is normal, then treatment is for symptoms only. If CMR shows a CM, then suppression of PVCs maybe helpful in managing a CM. Medications such as calcium channel blockers or beta blockers in some cases reduce the frequency and duration of the episodes. She is on Toprol XL which has helped some with symptoms. The other option discussed was an electrophysiology study (EPS) and possible ablation. The procedural risk of EPS and PVC ablation were discussed in detail. Risks discussed include: vascular complications, CVA, AVB, pericardial effusion and tamponade. We also discussed that if PVC burden is somewhat variable, it is possible that there would be sufficient amount of PVCs during procedure to allow for adequate mapping. We also discussed that at times the PVCs will be traced to an origin in the heart where it is not safe to proceed with ablation. She also asked about her thyroid and if that could be  contributing. We recommended she talk to an endocrinologist, but her TSH have been in goal/normal ranges as of late. We will awaiting the CMR results to determine next steps.       Follow up to be determined based on results.        History of Present Illness/Subjective    Ms. Manju Weiss is a 37 year old female who comes in today for EP consultation of PVCs.    Ms. Weiss is a 37 year old female who has a past medical history significant for PVCs/NSVT, borderline LV dilation, hypothyroidism, pre-eclampsia, and anxiety.     She was recently seen by Cardiology for palpitations that she reports as fluttering/skipping sensation in her chest sometimes with lightheadedness/presyncope. Symptoms are most prominent at rest. She does have a history of syncope as a teenager during a time when she had an eating disorder. She had an echo in 12/2022 which showed mild LV dilation, LVEF 55-60%, normal RV size/function, and mild/modearte LA enlargement. She had a NM stress test in 1/2023 that was negative for ischemia and showed LV enlargement, mild global hypokinesis, and LVEF 51%. A zio patch monitor from 11/8/22-11/22/22 showed 29 NSVT up to 11 beats and 10.9% PVC burden (one dominant morphology) and 61 symptom activations mostly corresponded to PVCs. She was placed on Toprol XL and referred to EP for PVC evaluation and management.     She reports feeling at baseline. She continues to have some PVC symptoms. Feels Metoprolol has helped some. She denies chest discomfort, abdominal fullness/bloating or peripheral edema, shortness of breath, paroxysmal nocturnal dyspnea, orthopnea, or syncope. No known family history of SCD or early heart disease. Her mother was diagnosed with lung cancer and subsequent HF recently. She has been ordered for a CMR which has not been completed yet. TSH was recently checked and normal. She denies any significant ETOH or caffeine intake. She is worried about the dilated LV on imaging and  implications of PVCs on her heart. Presenting 12 lead ECG shows NSR Vent Rate 65 bpm,  ms,  ms, QTc 438 ms.  Current cardiac medications include: Toprol XL.       I have reviewed and updated the patient's Past Medical History, Social History, Family History and Medication List.     Cardiographics (Personally Reviewed) :   12/8/22 Echo:   Interpretation Summary  Global and regional left ventricular function is normal with an EF of 55-60%.  Mild left ventricular dilation is present.  Right ventricular function, chamber size, wall motion, and thickness are  normal.  Mild to moderate left atrial enlargement is present.  The atrial septum is intact as assessed by color Doppler and agitated saline  bubble study .  Trace to mild mitral insufficiency is present.  Trace tricuspid insufficiency is present.  Trace pulmonic insufficiency is present.    1/5/23 NM Stress Test:      The nuclear stress test is abnormal.     The left ventricular ejection fraction at rest is 51%.  The left ventricular ejection fraction at stress is 58%.     There is no prior study for comparison.     enlarged left ventricle with global mild hypokinesis.  No reversible ischemia          Physical Examination   There were no vitals taken for this visit.    /67 (BP Location: Right arm, Patient Position: Sitting, Cuff Size: Adult Regular)   Pulse 70   Temp 98.4  F (36.9  C) (Tympanic)   Resp 17   SpO2 96%  Wt Readings from Last 3 Encounters:   01/11/23 85.1 kg (187 lb 9.6 oz)   12/01/22 83.8 kg (184 lb 12.8 oz)   11/07/22 83 kg (183 lb)            Medications  Allergies   Current Outpatient Medications   Medication Sig Dispense Refill     albuterol (PROAIR HFA/PROVENTIL HFA/VENTOLIN HFA) 108 (90 Base) MCG/ACT inhaler Inhale 2 puffs into the lungs every 4 hours as needed for shortness of breath / dyspnea or wheezing 18 g 1     APPLE CIDER VINEGAR PO        fluticasone-salmeterol (ADVAIR) 100-50 MCG/ACT inhaler Inhale 1 puff into the  lungs every 12 hours 60 each 3     levonorgestrel (MIRENA) 20 MCG/24HR IUD 1 each by Intrauterine route once       levothyroxine (SYNTHROID/LEVOTHROID) 100 MCG tablet Take 1 tablet (100 mcg) by mouth daily for 90 days 90 tablet 0     metoprolol succinate ER (TOPROL XL) 50 MG 24 hr tablet Take 1 tablet (50 mg) by mouth At Bedtime for 90 days 90 tablet 1     multivitamin w/minerals (THERA-VIT-M) tablet Take 1 tablet by mouth daily      Allergies   Allergen Reactions     Sulfa Drugs Shortness Of Breath     Latex Hives, Itching and Rash         Lab Results (Personally Reviewed)    Chemistry/lipid CBC Cardiac Enzymes/BNP/TSH/INR   Lab Results   Component Value Date    BUN 12.7 11/07/2022     11/07/2022    CO2 23 11/07/2022     Creatinine   Date Value Ref Range Status   11/07/2022 0.69 0.51 - 0.95 mg/dL Final   03/08/2021 0.72 0.52 - 1.04 mg/dL Final       Lab Results   Component Value Date    CHOL 132 03/08/2021    HDL 36 (L) 03/08/2021    LDL 82 03/08/2021      Lab Results   Component Value Date    WBC 10.7 12/01/2022    HGB 16.3 (H) 12/01/2022    HCT 47.9 (H) 12/01/2022    MCV 92 12/01/2022     12/01/2022    Lab Results   Component Value Date    TSH 3.09 01/11/2023        The patient states understanding and is agreeable with the plan.   S[facility] Stafford MD [facility]  [facility] - Electrophysiology      Total time spent on patient visit, reviewing notes, imaging, labs, orders, and completing necessary documentation: 60 minutes.

## 2023-01-20 NOTE — PATIENT INSTRUCTIONS
You were seen by Dr. Stafford via telemedicine, 01/20/23.     1. Meds- diltiazem, flecainide, sotalol (amiodarone wouldn't be an option due to your thyroid)    2. Ablation 70-80% success rate    3. Continue with your cardiac MRI      Please call the cardiology office with problems, questions, or concerns at [phone].    If you experience chest pain, chest pressure, chest tightness, shortness of breath, fainting, lightheadedness, nausea, vomiting, or other concerning symptoms, please report to the Emergency Department or call 911. These symptoms may be emergent, and best treated in the Emergency Department.     Marvin SAMUEL RN  CHF Care Coordinator   [phone] Option #8

## 2023-01-24 ENCOUNTER — HOSPITAL ENCOUNTER (OUTPATIENT)
Dept: RESPIRATORY THERAPY | Facility: HOSPITAL | Age: 38
Discharge: HOME | End: 2023-01-24
Attending: NURSE PRACTITIONER | Admitting: INTERNAL MEDICINE
Payer: COMMERCIAL

## 2023-01-24 DIAGNOSIS — R06.2 WHEEZING: ICD-10-CM

## 2023-01-24 LAB — HGB BLD-MCNC: 15.3 G/DL (ref 11.7–15.7)

## 2023-01-24 PROCEDURE — 85018 HEMOGLOBIN: CPT | Performed by: NURSE PRACTITIONER

## 2023-01-24 PROCEDURE — 94726 PLETHYSMOGRAPHY LUNG VOLUMES: CPT

## 2023-01-24 PROCEDURE — 94726 PLETHYSMOGRAPHY LUNG VOLUMES: CPT | Mod: 26 | Performed by: INTERNAL MEDICINE

## 2023-01-24 PROCEDURE — 36415 COLL VENOUS BLD VENIPUNCTURE: CPT | Performed by: NURSE PRACTITIONER

## 2023-01-24 PROCEDURE — 94729 DIFFUSING CAPACITY: CPT | Mod: 26 | Performed by: INTERNAL MEDICINE

## 2023-01-24 PROCEDURE — 94010 BREATHING CAPACITY TEST: CPT | Mod: 26 | Performed by: INTERNAL MEDICINE

## 2023-01-24 PROCEDURE — 94010 BREATHING CAPACITY TEST: CPT

## 2023-01-24 PROCEDURE — 94729 DIFFUSING CAPACITY: CPT

## 2023-01-26 DIAGNOSIS — E03.4 HYPOTHYROIDISM DUE TO ACQUIRED ATROPHY OF THYROID: Primary | ICD-10-CM

## 2023-01-26 DIAGNOSIS — R53.83 OTHER FATIGUE: ICD-10-CM

## 2023-01-27 ENCOUNTER — TELEPHONE (OUTPATIENT)
Dept: FAMILY MEDICINE | Facility: OTHER | Age: 38
End: 2023-01-27

## 2023-03-03 ENCOUNTER — MYC REFILL (OUTPATIENT)
Dept: CARDIOLOGY | Facility: OTHER | Age: 38
End: 2023-03-03

## 2023-03-03 DIAGNOSIS — E03.4 HYPOTHYROIDISM DUE TO ACQUIRED ATROPHY OF THYROID: ICD-10-CM

## 2023-03-06 RX ORDER — LEVOTHYROXINE SODIUM 100 UG/1
100 TABLET ORAL DAILY
Qty: 90 TABLET | Refills: 0 | Status: SHIPPED | OUTPATIENT
Start: 2023-03-06 | End: 2023-06-20

## 2023-03-06 NOTE — TELEPHONE ENCOUNTER
Levothyroxine      Last Written Prescription Date:  12/1/22  Last Fill Quantity: 90,   # refills: 0  Last Office Visit: 11/7/22  Future Office visit:

## 2023-03-22 DIAGNOSIS — R06.2 WHEEZING: ICD-10-CM

## 2023-03-23 RX ORDER — FLUTICASONE PROPIONATE AND SALMETEROL 100; 50 UG/1; UG/1
POWDER RESPIRATORY (INHALATION)
Qty: 60 EACH | Refills: 0 | Status: SHIPPED | OUTPATIENT
Start: 2023-03-23 | End: 2023-08-14

## 2023-03-23 NOTE — TELEPHONE ENCOUNTER
Fluticasone-Salmeterol (Advair) 100-50 MCG/ACT inhaler    Last Written Prescription Date:  11/07/2022  Last Fill Quantity: 60,   # refills: 3  Last Office Visit: 11/07/2022

## 2023-03-23 NOTE — TELEPHONE ENCOUNTER
Pt of Ronny    Pharmacy request for Wixela.  [facility] record is for Advair.     Inhaled Steroids Protocol Failed 03/22/2023 08:39 AM   Protocol Details  Asthma control assessment score within normal limits in last 6 months        Sylvie Reina RN

## 2023-06-16 ENCOUNTER — VIRTUAL VISIT (OUTPATIENT)
Dept: CARDIOLOGY | Facility: CLINIC | Age: 38
End: 2023-06-16
Attending: INTERNAL MEDICINE
Payer: COMMERCIAL

## 2023-06-16 DIAGNOSIS — I49.3 FREQUENT PVCS: Primary | ICD-10-CM

## 2023-06-16 PROCEDURE — 99215 OFFICE O/P EST HI 40 MIN: CPT | Mod: 95 | Performed by: INTERNAL MEDICINE

## 2023-06-16 RX ORDER — SODIUM CHLORIDE 9 MG/ML
INJECTION, SOLUTION INTRAVENOUS CONTINUOUS
Status: CANCELLED | OUTPATIENT
Start: 2023-06-16

## 2023-06-16 RX ORDER — LIDOCAINE 40 MG/G
CREAM TOPICAL
Status: CANCELLED | OUTPATIENT
Start: 2023-06-16

## 2023-06-16 NOTE — NURSING NOTE
Is the patient currently in the state of [place]? YES    Visit mode:VIDEO    If the visit is dropped, the patient can be reconnected by: VIDEO VISIT: Text to cell phone: [phone]    Will anyone else be joining the visit? NO      How would you like to obtain your AVS? MyChart    Are changes needed to the allergy or medication list? NO    Reason for visit: Consult      No other pt vitals to report per pt    Grace Ruiz VF

## 2023-06-16 NOTE — PROGRESS NOTES
ELECTROPHYSIOLOGY TELEMEDICINE CLINIC VISIT    Assessment/Recommendations   Assessment/Plan:    Ms. Weiss is a 37 year old female who has a past medical history significant for PVCs/NSVT, borderline LV dilation, hypothyroidism, pre-eclampsia, and anxiety.     Premature Ventricular Contractions:   We had a long discussion with patient regarding PVCs. We discussed various options for treatment of PVCs. In a structurally normal heart, PVCs are considered relatively benign. Treatment is therefore indicated primarily for relief of symptoms or if associated with a cardiomyopathy. She does have some mild LV dilation on echo and CMR showing mildly reduced LVEF 44% with no LGE.  Medications such as calcium channel blockers or beta blockers in some cases reduce the frequency and duration of the episodes. She is on Toprol XL which has helped some with symptoms but still has daily symptoms. The other option discussed was an electrophysiology study (EPS) and possible ablation. The procedural risk of EPS and PVC ablation were discussed in detail. Risks discussed include: vascular complications, CVA, AVB, pericardial effusion and tamponade. We also discussed that if PVC burden is somewhat variable, it is possible that there would be sufficient amount of PVCs during procedure to allow for adequate mapping. We also discussed that at times the PVCs will be traced to an origin in the heart where it is not safe to proceed with ablation. We decided to pursue ablation given mildly reduced LVEF and symptoms. We will work to arrange this for her in near future.       Follow up 3 months post ablation with echo prior.        History of Present Illness/Subjective    Ms. Manju Weiss is a 37 year old female who comes in today for EP consultation of PVCs.    Ms. Weiss is a 37 year old female who has a past medical history significant for PVCs/NSVT, borderline LV dilation, hypothyroidism, pre-eclampsia, and anxiety.     She was  recently seen by Cardiology for palpitations that she reports as fluttering/skipping sensation in her chest sometimes with lightheadedness/presyncope. Symptoms are most prominent at rest. She does have a history of syncope as a teenager during a time when she had an eating disorder. She had an echo in 12/2022 which showed mild LV dilation, LVEF 55-60%, normal RV size/function, and mild/modearte LA enlargement. She had a NM stress test in 1/2023 that was negative for ischemia and showed LV enlargement, mild global hypokinesis, and LVEF 51%. A zio patch monitor from 11/8/22-11/22/22 showed 29 NSVT up to 11 beats and 10.9% PVC burden (one dominant morphology) and 61 symptom activations mostly corresponded to PVCs. She was placed on Toprol XL and referred to EP for PVC evaluation and management.     EP Visit 1/20/23: She reports feeling at baseline. She continues to have some PVC symptoms. Feels Metoprolol has helped some. She denies chest discomfort, abdominal fullness/bloating or peripheral edema, shortness of breath, paroxysmal nocturnal dyspnea, orthopnea, or syncope. No known family history of SCD or early heart disease. Her mother was diagnosed with lung cancer and subsequent HF recently. She has been ordered for a CMR which has not been completed yet. TSH was recently checked and normal. She denies any significant ETOH or caffeine intake. She is worried about the dilated LV on imaging and implications of PVCs on her heart. Presenting 12 lead ECG shows NSR Vent Rate 65 bpm,  ms,  ms, QTc 438 ms.  Current cardiac medications include: Toprol XL.     She presents today for follow up. She had a CMR at OSH. We were able to get these images and reviewed with Dr. Vega.   This showed mildly reduced LVEF 44%, no LGE. She reports continuing to have daily PVC symptoms. She denies chest discomfort, abdominal fullness/bloating or peripheral edema, shortness of breath, paroxysmal nocturnal dyspnea, orthopnea, or  syncope. Current cardiac medications include: Toprol XL.       I have reviewed and updated the patient's Past Medical History, Social History, Family History and Medication List.     Cardiographics (Personally Reviewed) :   12/8/22 Echo:   Interpretation Summary  Global and regional left ventricular function is normal with an EF of 55-60%.  Mild left ventricular dilation is present.  Right ventricular function, chamber size, wall motion, and thickness are  normal.  Mild to moderate left atrial enlargement is present.  The atrial septum is intact as assessed by color Doppler and agitated saline  bubble study .  Trace to mild mitral insufficiency is present.  Trace tricuspid insufficiency is present.  Trace pulmonic insufficiency is present.    1/5/23 NM Stress Test:      The nuclear stress test is abnormal.     The left ventricular ejection fraction at rest is 51%.  The left ventricular ejection fraction at stress is 58%.     There is no prior study for comparison.     enlarged left ventricle with global mild hypokinesis.  No reversible ischemia     4/14/23 CMR (OSH Report):  IMPRESSION:   1.   Findings consistent with non-ischemic dilated cardiomyopathy.   2.   Mildly dilated left ventricle with mildly reduced systolic function; LVEF = 47.7%.   3.   Mildly dilated right ventricle with mildly reduced RVEF = 49%.   4.   No myocardial infarction or fibrosis.          Physical Examination   There were no vitals taken for this visit.    /67 (BP Location: Right arm, Patient Position: Sitting, Cuff Size: Adult Regular)   Pulse 70   Temp 98.4  F (36.9  C) (Tympanic)   Resp 17   SpO2 96%  Wt Readings from Last 3 Encounters:   01/11/23 85.1 kg (187 lb 9.6 oz)   12/01/22 83.8 kg (184 lb 12.8 oz)   11/07/22 83 kg (183 lb)     The rest of a comprehensive physical examination is deferred due to nature of video visit restrictions.  CONSITUTIONAL: no acute distress  HEENT: no icterus, no redness or discharge, neck supple  CV:  no visible edema of visualized extremities. No JVD.   RESPIRATORY: respirations nonlabored, no cough  NEURO: AA&Ox3, speech fluent/appropriate, motor grossly nonfocal  PSYCH: cooperative, affect appropriate  DERM: no rashes on visualized face/neck/upper extremities         Medications  Allergies   Current Outpatient Medications   Medication Sig Dispense Refill     albuterol (PROAIR HFA/PROVENTIL HFA/VENTOLIN HFA) 108 (90 Base) MCG/ACT inhaler Inhale 2 puffs into the lungs every 4 hours as needed for shortness of breath / dyspnea or wheezing 18 g 1     APPLE CIDER VINEGAR PO        fluticasone-salmeterol (WIXELA INHUB) 100-50 MCG/ACT inhaler INHALE 1 PUFF INTO THE LUNGS EVERY 12 HOURS. 60 each 0     levonorgestrel (MIRENA) 20 MCG/24HR IUD 1 each by Intrauterine route once       levothyroxine (SYNTHROID/LEVOTHROID) 100 MCG tablet Take 1 tablet (100 mcg) by mouth daily 90 tablet 0     multivitamin w/minerals (THERA-VIT-M) tablet Take 1 tablet by mouth daily       metoprolol succinate ER (TOPROL XL) 50 MG 24 hr tablet Take 1 tablet (50 mg) by mouth At Bedtime for 90 days 90 tablet 1    Allergies   Allergen Reactions     Sulfa Antibiotics Shortness Of Breath     Latex Hives, Itching and Rash         Lab Results (Personally Reviewed)    Chemistry/lipid CBC Cardiac Enzymes/BNP/TSH/INR   Lab Results   Component Value Date    BUN 12.7 11/07/2022     11/07/2022    CO2 23 11/07/2022     Creatinine   Date Value Ref Range Status   11/07/2022 0.69 0.51 - 0.95 mg/dL Final   03/08/2021 0.72 0.52 - 1.04 mg/dL Final       Lab Results   Component Value Date    CHOL 132 03/08/2021    HDL 36 (L) 03/08/2021    LDL 82 03/08/2021      Lab Results   Component Value Date    WBC 10.7 12/01/2022    HGB 15.3 01/24/2023    HCT 47.9 (H) 12/01/2022    MCV 92 12/01/2022     12/01/2022    Lab Results   Component Value Date    TSH 3.09 01/11/2023        Video-Visit Details    Type of service:  Video Visit   Video Start Time: 1:00  PM  Video End Time:1:30 PM    Originating Location (pt. Location): Home  Distant Location (provider location):  On-site  Platform used for Video Visit: Doxim[employer]    I have reviewed the note as documented above.  This accurately captures the substance of my virtual visit with the patient. The patient states understanding and is agreeable with the plan.   Saw Stafford MD [facility]  Cardiology - Electrophysiology      Total time spent on patient visit, reviewing notes, imaging, labs, orders, and completing necessary documentation: 45 minutes.  >50% of visit spent on counseling patient and/or coordination of care.

## 2023-06-16 NOTE — LETTER
6/16/2023      RE: Manju Weiss  [address]  [facility] [address]       Dear Colleague,    Thank you for the opportunity to participate in the care of your patient, Manju Weiss, at the [facility] [place] [facility]. Please see a copy of my visit note below.        ELECTROPHYSIOLOGY TELEMEDICINE CLINIC VISIT    Assessment/Recommendations   Assessment/Plan:    Ms. Weiss is a 37 year old female who has a past medical history significant for PVCs/NSVT, borderline LV dilation, hypothyroidism, pre-eclampsia, and anxiety.     Premature Ventricular Contractions:   We had a long discussion with patient regarding PVCs. We discussed various options for treatment of PVCs. In a structurally normal heart, PVCs are considered relatively benign. Treatment is therefore indicated primarily for relief of symptoms or if associated with a cardiomyopathy. She does have some mild LV dilation on echo and CMR showing mildly reduced LVEF 44% with no LGE.  Medications such as calcium channel blockers or beta blockers in some cases reduce the frequency and duration of the episodes. She is on Toprol XL which has helped some with symptoms but still has daily symptoms. The other option discussed was an electrophysiology study (EPS) and possible ablation. The procedural risk of EPS and PVC ablation were discussed in detail. Risks discussed include: vascular complications, CVA, AVB, pericardial effusion and tamponade. We also discussed that if PVC burden is somewhat variable, it is possible that there would be sufficient amount of PVCs during procedure to allow for adequate mapping. We also discussed that at times the PVCs will be traced to an origin in the heart where it is not safe to proceed with ablation. We decided to pursue ablation given mildly reduced LVEF and symptoms. We will work to arrange this for her in near future.       Follow up 3 months post ablation  with echo prior.        History of Present Illness/Subjective    Ms. Manju Weiss is a 37 year old female who comes in today for EP consultation of PVCs.    Ms. Weiss is a 37 year old female who has a past medical history significant for PVCs/NSVT, borderline LV dilation, hypothyroidism, pre-eclampsia, and anxiety.     She was recently seen by Cardiology for palpitations that she reports as fluttering/skipping sensation in her chest sometimes with lightheadedness/presyncope. Symptoms are most prominent at rest. She does have a history of syncope as a teenager during a time when she had an eating disorder. She had an echo in 12/2022 which showed mild LV dilation, LVEF 55-60%, normal RV size/function, and mild/modearte LA enlargement. She had a NM stress test in 1/2023 that was negative for ischemia and showed LV enlargement, mild global hypokinesis, and LVEF 51%. A zio patch monitor from 11/8/22-11/22/22 showed 29 NSVT up to 11 beats and 10.9% PVC burden (one dominant morphology) and 61 symptom activations mostly corresponded to PVCs. She was placed on Toprol XL and referred to EP for PVC evaluation and management.     EP Visit 1/20/23: She reports feeling at baseline. She continues to have some PVC symptoms. Feels Metoprolol has helped some. She denies chest discomfort, abdominal fullness/bloating or peripheral edema, shortness of breath, paroxysmal nocturnal dyspnea, orthopnea, or syncope. No known family history of SCD or early heart disease. Her mother was diagnosed with lung cancer and subsequent HF recently. She has been ordered for a CMR which has not been completed yet. TSH was recently checked and normal. She denies any significant ETOH or caffeine intake. She is worried about the dilated LV on imaging and implications of PVCs on her heart. Presenting 12 lead ECG shows NSR Vent Rate 65 bpm,  ms,  ms, QTc 438 ms.  Current cardiac medications include: Toprol XL.     She presents today for  follow up. She had a CMR at OSH. We were able to get these images and reviewed with Dr. Vega.   This showed mildly reduced LVEF 44%, no LGE. She reports continuing to have daily PVC symptoms. She denies chest discomfort, abdominal fullness/bloating or peripheral edema, shortness of breath, paroxysmal nocturnal dyspnea, orthopnea, or syncope. Current cardiac medications include: Toprol XL.       I have reviewed and updated the patient's Past Medical History, Social History, Family History and Medication List.     Cardiographics (Personally Reviewed) :   12/8/22 Echo:   Interpretation Summary  Global and regional left ventricular function is normal with an EF of 55-60%.  Mild left ventricular dilation is present.  Right ventricular function, chamber size, wall motion, and thickness are  normal.  Mild to moderate left atrial enlargement is present.  The atrial septum is intact as assessed by color Doppler and agitated saline  bubble study .  Trace to mild mitral insufficiency is present.  Trace tricuspid insufficiency is present.  Trace pulmonic insufficiency is present.    1/5/23 NM Stress Test:     The nuclear stress test is abnormal.    The left ventricular ejection fraction at rest is 51%.  The left ventricular ejection fraction at stress is 58%.    There is no prior study for comparison.     enlarged left ventricle with global mild hypokinesis.  No reversible ischemia     4/14/23 CMR (OSH Report):  IMPRESSION:   1.   Findings consistent with non-ischemic dilated cardiomyopathy.   2.   Mildly dilated left ventricle with mildly reduced systolic function; LVEF = 47.7%.   3.   Mildly dilated right ventricle with mildly reduced RVEF = 49%.   4.   No myocardial infarction or fibrosis.          Physical Examination   There were no vitals taken for this visit.   /67 (BP Location: Right arm, Patient Position: Sitting, Cuff Size: Adult Regular)  Pulse 70  Temp 98.4  F (36.9  C) (Tympanic)  Resp 17  SpO2 96%  Wt  Readings from Last 3 Encounters:   01/11/23 85.1 kg (187 lb 9.6 oz)   12/01/22 83.8 kg (184 lb 12.8 oz)   11/07/22 83 kg (183 lb)     The rest of a comprehensive physical examination is deferred due to nature of video visit restrictions.  CONSITUTIONAL: no acute distress  HEENT: no icterus, no redness or discharge, neck supple  CV: no visible edema of visualized extremities. No JVD.   RESPIRATORY: respirations nonlabored, no cough  NEURO: AA&Ox3, speech fluent/appropriate, motor grossly nonfocal  PSYCH: cooperative, affect appropriate  DERM: no rashes on visualized face/neck/upper extremities         Medications  Allergies   Current Outpatient Medications   Medication Sig Dispense Refill    albuterol (PROAIR HFA/PROVENTIL HFA/VENTOLIN HFA) 108 (90 Base) MCG/ACT inhaler Inhale 2 puffs into the lungs every 4 hours as needed for shortness of breath / dyspnea or wheezing 18 g 1    APPLE CIDER VINEGAR PO       fluticasone-salmeterol (WIXELA INHUB) 100-50 MCG/ACT inhaler INHALE 1 PUFF INTO THE LUNGS EVERY 12 HOURS. 60 each 0    levonorgestrel (MIRENA) 20 MCG/24HR IUD 1 each by Intrauterine route once      levothyroxine (SYNTHROID/LEVOTHROID) 100 MCG tablet Take 1 tablet (100 mcg) by mouth daily 90 tablet 0    multivitamin w/minerals (THERA-VIT-M) tablet Take 1 tablet by mouth daily      metoprolol succinate ER (TOPROL XL) 50 MG 24 hr tablet Take 1 tablet (50 mg) by mouth At Bedtime for 90 days 90 tablet 1    Allergies   Allergen Reactions    Sulfa Antibiotics Shortness Of Breath    Latex Hives, Itching and Rash         Lab Results (Personally Reviewed)    Chemistry/lipid CBC Cardiac Enzymes/BNP/TSH/INR   Lab Results   Component Value Date    BUN 12.7 11/07/2022     11/07/2022    CO2 23 11/07/2022     Creatinine   Date Value Ref Range Status   11/07/2022 0.69 0.51 - 0.95 mg/dL Final   03/08/2021 0.72 0.52 - 1.04 mg/dL Final       Lab Results   Component Value Date    CHOL 132 03/08/2021    HDL 36 (L) 03/08/2021     LDL 82 03/08/2021      Lab Results   Component Value Date    WBC 10.7 12/01/2022    HGB 15.3 01/24/2023    HCT 47.9 (H) 12/01/2022    MCV 92 12/01/2022     12/01/2022    Lab Results   Component Value Date    TSH 3.09 01/11/2023        Video-Visit Details    Type of service:  Video Visit   Video Start Time: 1:00 PM  Video End Time:1:30 PM    Originating Location (pt. Location): Home  Distant Location (provider location):  On-site  Platform used for Video Visit: [employer]    I have reviewed the note as documented above.  This accurately captures the substance of my virtual visit with the patient. The patient states understanding and is agreeable with the plan.   Saw Stafford MD [facility] - Electrophysiology      Total time spent on patient visit, reviewing notes, imaging, labs, orders, and completing necessary documentation: 45 minutes.  >50% of visit spent on counseling patient and/or coordination of care.

## 2023-06-19 DIAGNOSIS — E03.4 HYPOTHYROIDISM DUE TO ACQUIRED ATROPHY OF THYROID: ICD-10-CM

## 2023-06-19 NOTE — TELEPHONE ENCOUNTER
Levothyroxine (Synthroid/Levothoroid) 100 MCG tablet    Last Written Prescription Date:  03/06/2023  Last Fill Quantity: 90,   # refills: 0  Last Office Visit: 11/07/2022

## 2023-06-20 ENCOUNTER — TELEPHONE (OUTPATIENT)
Dept: CARDIOLOGY | Facility: CLINIC | Age: 38
End: 2023-06-20
Payer: COMMERCIAL

## 2023-06-20 RX ORDER — LEVOTHYROXINE SODIUM 100 UG/1
TABLET ORAL
Qty: 90 TABLET | Refills: 0 | Status: SHIPPED | OUTPATIENT
Start: 2023-06-20 | End: 2023-10-02

## 2023-06-20 NOTE — TELEPHONE ENCOUNTER
EP Scheduling called the patient to schedule Ablation with Dr. Stafford. The number [phone] was left for the patient to return the call and schedule the procedure.    Gwen Hammonds  Periop Electrophysiology   [phone]

## 2023-06-28 DIAGNOSIS — I49.3 FREQUENT PVCS: Primary | ICD-10-CM

## 2023-06-28 NOTE — TELEPHONE ENCOUNTER
Another attempt made. A letter will be mailed.    Gwen Hammonds  Periop Electrophysiology   [phone]

## 2023-06-30 ENCOUNTER — ALLIED HEALTH/NURSE VISIT (OUTPATIENT)
Dept: CARDIOLOGY | Facility: CLINIC | Age: 38
End: 2023-06-30
Attending: NURSE PRACTITIONER
Payer: COMMERCIAL

## 2023-06-30 DIAGNOSIS — I49.3 FREQUENT PVCS: ICD-10-CM

## 2023-07-30 PROCEDURE — 93248 EXT ECG>7D<15D REV&INTERPJ: CPT | Performed by: INTERNAL MEDICINE

## 2023-08-03 ENCOUNTER — MYC REFILL (OUTPATIENT)
Dept: CARDIOLOGY | Facility: OTHER | Age: 38
End: 2023-08-03

## 2023-08-03 DIAGNOSIS — I49.3 FREQUENT PVCS: ICD-10-CM

## 2023-08-03 DIAGNOSIS — I47.29 PAROXYSMAL VENTRICULAR TACHYCARDIA (H): ICD-10-CM

## 2023-08-07 RX ORDER — METOPROLOL SUCCINATE 50 MG/1
50 TABLET, EXTENDED RELEASE ORAL AT BEDTIME
Qty: 90 TABLET | Refills: 1 | Status: SHIPPED | OUTPATIENT
Start: 2023-08-07 | End: 2023-09-27

## 2023-08-07 NOTE — TELEPHONE ENCOUNTER
Toprol      Last Written Prescription Date:  01/11/23  Last Fill Quantity: 90,   # refills: 1  Last Office Visit: 11/07/22  Future Office visit:

## 2023-09-06 NOTE — PROGRESS NOTES
"  Assessment & Plan     1. Benign essential hypertension  Continue metoprolol  - Lipid Profile  - Comprehensive metabolic panel    2. Paroxysmal ventricular tachycardia (H)  Continue metoprolol  Follow-up with cardiology as scheduled.  EP ablation 9/25/2023.      3. Hypothyroidism due to acquired atrophy of thyroid  - TSH with free T4 reflex; Future  - TSH with free T4 reflex    4. Nonischemic cardiomyopathy (H)  Cardiac testing reviewed, stress test, echo cardiology notes reviewed.     5. Wheezing  Continue inhalers.            BMI:   Estimated body mass index is 26.63 kg/m  as calculated from the following:    Height as of 1/11/23: 1.689 m (5' 6.5\").    Weight as of this encounter: 76 kg (167 lb 8 oz).       Follow-up in 1 month.        Zohreh Aguila NP  [facility]   Manju is a 38 year old, presenting for the following health issues:  Chronic Disease Management      HPI     Hypertension Follow-up  Do you check your blood pressure regularly outside of the clinic? No   Are you following a low salt diet? No  Are your blood pressures ever more than 140 on the top number (systolic) OR more   than 90 on the bottom number (diastolic), for example 140/90? No      Hypothyroidism Follow-up  Since last visit, patient describes the following symptoms: weight loss of 20 lbs, intentional      Anxiety Follow-Up  How are you doing with your anxiety since your last visit? No change  Are you having other symptoms that might be associated with anxiety? No  Have you had a significant life event? Health Concerns   Are you feeling depressed? No  Do you have any concerns with your use of alcohol or other drugs? No    Social History     Tobacco Use    Smoking status: Every Day     Packs/day: 0.50     Years: 17.00     Pack years: 8.50     Types: Cigarettes    Smokeless tobacco: Never   Vaping Use    Vaping Use: Never used   Substance Use Topics    Alcohol use: Yes    Drug use: Never        "  3/8/2021    10:00 AM   BRODIE-7 SCORE   Total Score 2         3/8/2021    10:00 AM   PHQ   PHQ-9 Total Score 4   Q9: Thoughts of better off dead/self-harm past 2 weeks Not at all         Recent Labs   Lab Test 01/11/23  1120 11/07/22  1142 10/13/21  1231 03/08/21  1148   LDL  --   --   --  82   HDL  --   --   --  36*   TRIG  --   --   --  72   CR  --  0.69 0.69 0.72   GFRESTIMATED  --  >90 >90 >90   GFRESTBLACK  --   --   --  >90   POTASSIUM  --  4.2 3.5 4.1   TSH 3.09 5.73* 3.88 4.09*      BP Readings from Last 3 Encounters:   09/07/23 132/77   01/20/23 120/67   01/11/23 120/72    Wt Readings from Last 3 Encounters:   09/07/23 76 kg (167 lb 8 oz)   01/11/23 85.1 kg (187 lb 9.6 oz)   12/01/22 83.8 kg (184 lb 12.8 oz)                        Review of Systems   Constitutional, HEENT, cardiovascular, pulmonary, gi and gu systems are negative, except as otherwise noted.      Objective    /77 (BP Location: Left arm, Patient Position: Sitting, Cuff Size: Adult Regular)   Pulse 71   Temp 97  F (36.1  C) (Tympanic)   Resp 20   Wt 76 kg (167 lb 8 oz)   SpO2 96%   BMI 26.63 kg/m    Body mass index is 26.63 kg/m .  Physical Exam   GENERAL: healthy, alert and no distress  NECK: no adenopathy, no asymmetry, masses, or scars and thyroid normal to palpation  RESP: lungs clear to auscultation - no rales, rhonchi or wheezes  CV: regular rate and rhythm, normal S1 S2, no S3 or S4, no murmur, click or rub, no peripheral edema and peripheral pulses strong  MS: no gross musculoskeletal defects noted, no edema  PSYCH: mentation appears normal, affect normal/bright                Exam Accession# [id number]     CLINICAL HISTORY:   Examination: MR CARDIAC FUNCTION W VIABILITY   Date: 4/14/2023 9:45 AM   Indication: Region of Interest and Additional Information->Paroxysmal ventricular tachycardia, Frequent PVCs, Palpitations, Left ventricular dilatation   Comparison: Comparison was made with prior study of None.       TECHNIQUE:    Cardiac MRI with and without contrast was performed on 1.5T scanner. Imaging sequences included static SSFP, cine with SSFP in multiple orientations, and late gadolinium enhancement among others. MultiHance was administered intravenously without any adverse effects. Patient tolerated the procedure well.  Quantitative analysis was performed on [employer] workstation.       MEASUREMENTS:   Mid LV wall thickness - Anteroseptal: 0.8 cm   Mid LV wall thickness - Inferolateral: 0.7 cm   Mid LV end-diastolic diameter: 6.2 x 5.7 cm   Mid LV end-systolic diameter: 5.1 x 5 cm       FINDINGS:   Cardiac MRI with and without contrast was performed on a 1.5 T scanner to evaluate myocardial morphology, function and viability.     1. The left ventricle is mildly dilated. There is normal wall thickness. Global systolic function is mildly reduced. There are no regional wall motion abnormalities. Delayed enhancement imaging demonstrates no evidence of myocardial infarction, fibrosis or infiltrative disease.   LVEF: 47.7 %       LV stroke volume: 116.8 mL (61 - 117)         LV end-diastolic volume: 245 mL (93 - 177)        LV end-systolic volume: 128.2 mL (26 - 64)    LV end-diastolic volume index: 127.4 mL/m2 (61 - 97)         LV end-systolic volume index: 66.7 mL/m2 (17 - 37)     LV stroke volume index: 60.8 mL/m2 (40 - 66)                2. The right ventricle is mildly dilated in cavity size, wall thickness, with mildly reduced systolic function.   RVEF: 49 %           3. The left atrium is normal in size. The right atrium is normal sized b visual estimate.     4. The aortic valve is trileaflet in morphology. There is no significant aortic valve stenosis or regurgitation. Tricuspid and mitral valves also appear structurally normal with no significant stenosis or regurgitation.     5. Aorta:   Left-sided aortic arch with three conventional branching vessels normal   Ascending aorta: 2.9  x 2.8   cm,                       Descending  aorta: 2.4  x 2.1   cm,                       Main pulmonary artery: 2.7  cm     6. Pericardium: normal thickness     7. Extracardiac Findings: No gross abnormalities.   This study is intended for cardiac evaluation and extra cardiac structures cannot be accurately evaluated.     IMPRESSION:   1.   Findings consistent with non-ischemic dilated cardiomyopathy.   2.   Mildly dilated left ventricle with mildly reduced systolic function; LVEF = 47.7%.   3.   Mildly dilated right ventricle with mildly reduced RVEF = 49%.   4.   No myocardial infarction or fibrosis.

## 2023-09-07 ENCOUNTER — OFFICE VISIT (OUTPATIENT)
Dept: FAMILY MEDICINE | Facility: OTHER | Age: 38
End: 2023-09-07
Attending: NURSE PRACTITIONER
Payer: COMMERCIAL

## 2023-09-07 VITALS
TEMPERATURE: 97 F | SYSTOLIC BLOOD PRESSURE: 132 MMHG | HEART RATE: 71 BPM | OXYGEN SATURATION: 96 % | DIASTOLIC BLOOD PRESSURE: 77 MMHG | BODY MASS INDEX: 26.63 KG/M2 | WEIGHT: 167.5 LBS | RESPIRATION RATE: 20 BRPM

## 2023-09-07 DIAGNOSIS — E03.4 HYPOTHYROIDISM DUE TO ACQUIRED ATROPHY OF THYROID: ICD-10-CM

## 2023-09-07 DIAGNOSIS — I10 BENIGN ESSENTIAL HYPERTENSION: Primary | ICD-10-CM

## 2023-09-07 DIAGNOSIS — I42.8 NONISCHEMIC CARDIOMYOPATHY (H): ICD-10-CM

## 2023-09-07 DIAGNOSIS — I47.29 PAROXYSMAL VENTRICULAR TACHYCARDIA (H): ICD-10-CM

## 2023-09-07 DIAGNOSIS — R06.2 WHEEZING: ICD-10-CM

## 2023-09-07 PROBLEM — D23.70: Status: ACTIVE | Noted: 2020-03-09

## 2023-09-07 PROBLEM — Z87.59 HISTORY OF PRE-ECLAMPSIA: Status: ACTIVE | Noted: 2018-02-08

## 2023-09-07 PROBLEM — O28.0 HIGH RISK PREGNANCY WITH LOW PAPPA (PREGNANCY-ASSOCIATED PLASMA PROTEIN A): Status: ACTIVE | Noted: 2018-02-08

## 2023-09-07 PROBLEM — O16.3 HTN COMPLICATING PERIPREGNANCY, ANTEPARTUM, THIRD TRIMESTER: Status: ACTIVE | Noted: 2018-06-16

## 2023-09-07 PROBLEM — F41.9 ANXIETY: Status: ACTIVE | Noted: 2023-09-07

## 2023-09-07 PROBLEM — O36.8390 FETAL ARRHYTHMIA AFFECTING PREGNANCY, ANTEPARTUM: Status: ACTIVE | Noted: 2018-06-16

## 2023-09-07 PROBLEM — O09.899 HIGH RISK PREGNANCY WITH LOW PAPPA (PREGNANCY-ASSOCIATED PLASMA PROTEIN A): Status: ACTIVE | Noted: 2018-02-08

## 2023-09-07 PROBLEM — G43.909 MIGRAINE: Status: ACTIVE | Noted: 2023-09-07

## 2023-09-07 PROBLEM — D23.9 FIBROUS HISTIOCYTOMA OF SKIN: Status: ACTIVE | Noted: 2020-03-09

## 2023-09-07 PROBLEM — O40.3XX0 POLYHYDRAMNIOS AFFECTING PREGNANCY IN THIRD TRIMESTER: Status: ACTIVE | Noted: 2018-06-16

## 2023-09-07 LAB
ALBUMIN SERPL BCG-MCNC: 4.5 G/DL (ref 3.5–5.2)
ALP SERPL-CCNC: 74 U/L (ref 35–104)
ALT SERPL W P-5'-P-CCNC: 25 U/L (ref 0–50)
ANION GAP SERPL CALCULATED.3IONS-SCNC: 12 MMOL/L (ref 7–15)
AST SERPL W P-5'-P-CCNC: 22 U/L (ref 0–45)
BILIRUB SERPL-MCNC: 0.5 MG/DL
BUN SERPL-MCNC: 17.2 MG/DL (ref 6–20)
CALCIUM SERPL-MCNC: 9.2 MG/DL (ref 8.6–10)
CHLORIDE SERPL-SCNC: 104 MMOL/L (ref 98–107)
CHOLEST SERPL-MCNC: 114 MG/DL
CREAT SERPL-MCNC: 0.66 MG/DL (ref 0.51–0.95)
DEPRECATED HCO3 PLAS-SCNC: 22 MMOL/L (ref 22–29)
EGFRCR SERPLBLD CKD-EPI 2021: >90 ML/MIN/1.73M2
GLUCOSE SERPL-MCNC: 98 MG/DL (ref 70–99)
HDLC SERPL-MCNC: 37 MG/DL
LDLC SERPL CALC-MCNC: 58 MG/DL
NONHDLC SERPL-MCNC: 77 MG/DL
POTASSIUM SERPL-SCNC: 4.2 MMOL/L (ref 3.4–5.3)
PROT SERPL-MCNC: 6.9 G/DL (ref 6.4–8.3)
SODIUM SERPL-SCNC: 138 MMOL/L (ref 136–145)
TRIGL SERPL-MCNC: 93 MG/DL
TSH SERPL DL<=0.005 MIU/L-ACNC: 3.14 UIU/ML (ref 0.3–4.2)

## 2023-09-07 PROCEDURE — 80053 COMPREHEN METABOLIC PANEL: CPT | Performed by: NURSE PRACTITIONER

## 2023-09-07 PROCEDURE — 80061 LIPID PANEL: CPT | Performed by: NURSE PRACTITIONER

## 2023-09-07 PROCEDURE — 36415 COLL VENOUS BLD VENIPUNCTURE: CPT | Performed by: NURSE PRACTITIONER

## 2023-09-07 PROCEDURE — 99214 OFFICE O/P EST MOD 30 MIN: CPT | Performed by: NURSE PRACTITIONER

## 2023-09-07 PROCEDURE — 84443 ASSAY THYROID STIM HORMONE: CPT | Performed by: NURSE PRACTITIONER

## 2023-09-07 ASSESSMENT — PAIN SCALES - GENERAL: PAINLEVEL: NO PAIN (0)

## 2023-09-25 ENCOUNTER — APPOINTMENT (OUTPATIENT)
Dept: MEDSURG UNIT | Facility: CLINIC | Age: 38
End: 2023-09-25
Attending: INTERNAL MEDICINE
Payer: COMMERCIAL

## 2023-09-25 ENCOUNTER — APPOINTMENT (OUTPATIENT)
Dept: LAB | Facility: CLINIC | Age: 38
End: 2023-09-25
Attending: INTERNAL MEDICINE
Payer: COMMERCIAL

## 2023-09-25 ENCOUNTER — HOSPITAL ENCOUNTER (OUTPATIENT)
Facility: CLINIC | Age: 38
Discharge: HOME | End: 2023-09-25
Attending: INTERNAL MEDICINE | Admitting: INTERNAL MEDICINE
Payer: COMMERCIAL

## 2023-09-25 ENCOUNTER — APPOINTMENT (OUTPATIENT)
Dept: GENERAL RADIOLOGY | Facility: CLINIC | Age: 38
End: 2023-09-25
Attending: STUDENT IN AN ORGANIZED HEALTH CARE EDUCATION/TRAINING PROGRAM
Payer: COMMERCIAL

## 2023-09-25 VITALS
OXYGEN SATURATION: 93 % | WEIGHT: 164.7 LBS | HEIGHT: 67 IN | RESPIRATION RATE: 16 BRPM | BODY MASS INDEX: 25.85 KG/M2 | TEMPERATURE: 98.2 F | HEART RATE: 67 BPM | SYSTOLIC BLOOD PRESSURE: 133 MMHG | DIASTOLIC BLOOD PRESSURE: 85 MMHG

## 2023-09-25 DIAGNOSIS — I49.3 FREQUENT PVCS: ICD-10-CM

## 2023-09-25 LAB
ACT BLD: 139 SECONDS (ref 74–150)
ACT BLD: 280 SECONDS (ref 74–150)
ACT BLD: 297 SECONDS (ref 74–150)
ACT BLD: 314 SECONDS (ref 74–150)
ACT BLD: 314 SECONDS (ref 74–150)
ANION GAP SERPL CALCULATED.3IONS-SCNC: 11 MMOL/L (ref 7–15)
BUN SERPL-MCNC: 13.3 MG/DL (ref 6–20)
CALCIUM SERPL-MCNC: 9.2 MG/DL (ref 8.6–10)
CHLORIDE SERPL-SCNC: 104 MMOL/L (ref 98–107)
CREAT SERPL-MCNC: 0.8 MG/DL (ref 0.51–0.95)
DEPRECATED HCO3 PLAS-SCNC: 23 MMOL/L (ref 22–29)
EGFRCR SERPLBLD CKD-EPI 2021: >90 ML/MIN/1.73M2
ERYTHROCYTE [DISTWIDTH] IN BLOOD BY AUTOMATED COUNT: 12.2 % (ref 10–15)
GLUCOSE SERPL-MCNC: 110 MG/DL (ref 70–99)
HCG INTACT+B SERPL-ACNC: <1 MIU/ML
HCT VFR BLD AUTO: 45.5 % (ref 35–47)
HGB BLD-MCNC: 15.4 G/DL (ref 11.7–15.7)
INR PPP: 1.16 (ref 0.85–1.15)
MCH RBC QN AUTO: 31.7 PG (ref 26.5–33)
MCHC RBC AUTO-ENTMCNC: 33.8 G/DL (ref 31.5–36.5)
MCV RBC AUTO: 94 FL (ref 78–100)
PLATELET # BLD AUTO: 169 10E3/UL (ref 150–450)
POTASSIUM SERPL-SCNC: 4 MMOL/L (ref 3.4–5.3)
RBC # BLD AUTO: 4.86 10E6/UL (ref 3.8–5.2)
SODIUM SERPL-SCNC: 138 MMOL/L (ref 136–145)
WBC # BLD AUTO: 9.5 10E3/UL (ref 4–11)

## 2023-09-25 PROCEDURE — 250N000013 HC RX MED GY IP 250 OP 250 PS 637: Performed by: STUDENT IN AN ORGANIZED HEALTH CARE EDUCATION/TRAINING PROGRAM

## 2023-09-25 PROCEDURE — 250N000011 HC RX IP 250 OP 636: Performed by: INTERNAL MEDICINE

## 2023-09-25 PROCEDURE — 258N000003 HC RX IP 258 OP 636: Performed by: NURSE PRACTITIONER

## 2023-09-25 PROCEDURE — 999N000142 HC STATISTIC PROCEDURE PREP ONLY

## 2023-09-25 PROCEDURE — C1887 CATHETER, GUIDING: HCPCS | Performed by: INTERNAL MEDICINE

## 2023-09-25 PROCEDURE — C1894 INTRO/SHEATH, NON-LASER: HCPCS | Performed by: INTERNAL MEDICINE

## 2023-09-25 PROCEDURE — 85027 COMPLETE CBC AUTOMATED: CPT | Performed by: NURSE PRACTITIONER

## 2023-09-25 PROCEDURE — 93654 COMPRE EP EVAL TX VT: CPT | Performed by: INTERNAL MEDICINE

## 2023-09-25 PROCEDURE — 85610 PROTHROMBIN TIME: CPT | Performed by: NURSE PRACTITIONER

## 2023-09-25 PROCEDURE — 272N000001 HC OR GENERAL SUPPLY STERILE: Performed by: INTERNAL MEDICINE

## 2023-09-25 PROCEDURE — 85347 COAGULATION TIME ACTIVATED: CPT | Mod: 91

## 2023-09-25 PROCEDURE — C1759 CATH, INTRA ECHOCARDIOGRAPHY: HCPCS | Performed by: INTERNAL MEDICINE

## 2023-09-25 PROCEDURE — 93010 ELECTROCARDIOGRAM REPORT: CPT | Performed by: INTERNAL MEDICINE

## 2023-09-25 PROCEDURE — 36415 COLL VENOUS BLD VENIPUNCTURE: CPT | Performed by: NURSE PRACTITIONER

## 2023-09-25 PROCEDURE — C1732 CATH, EP, DIAG/ABL, 3D/VECT: HCPCS | Performed by: INTERNAL MEDICINE

## 2023-09-25 PROCEDURE — 250N000013 HC RX MED GY IP 250 OP 250 PS 637: Performed by: NURSE PRACTITIONER

## 2023-09-25 PROCEDURE — 71045 X-RAY EXAM CHEST 1 VIEW: CPT

## 2023-09-25 PROCEDURE — 999N000134 HC STATISTIC PP CARE STAGE 3

## 2023-09-25 PROCEDURE — 250N000009 HC RX 250: Performed by: INTERNAL MEDICINE

## 2023-09-25 PROCEDURE — C1760 CLOSURE DEV, VASC: HCPCS | Performed by: INTERNAL MEDICINE

## 2023-09-25 PROCEDURE — C1733 CATH, EP, OTHR THAN COOL-TIP: HCPCS | Performed by: INTERNAL MEDICINE

## 2023-09-25 PROCEDURE — 93662 INTRACARDIAC ECG (ICE): CPT | Performed by: INTERNAL MEDICINE

## 2023-09-25 PROCEDURE — 84702 CHORIONIC GONADOTROPIN TEST: CPT | Performed by: NURSE PRACTITIONER

## 2023-09-25 PROCEDURE — 99152 MOD SED SAME PHYS/QHP 5/>YRS: CPT | Mod: GC | Performed by: INTERNAL MEDICINE

## 2023-09-25 PROCEDURE — 93662 INTRACARDIAC ECG (ICE): CPT | Mod: 26 | Performed by: INTERNAL MEDICINE

## 2023-09-25 PROCEDURE — 250N000011 HC RX IP 250 OP 636: Mod: JZ | Performed by: INTERNAL MEDICINE

## 2023-09-25 PROCEDURE — 250N000013 HC RX MED GY IP 250 OP 250 PS 637

## 2023-09-25 PROCEDURE — 80048 BASIC METABOLIC PNL TOTAL CA: CPT | Performed by: NURSE PRACTITIONER

## 2023-09-25 PROCEDURE — 999N000054 HC STATISTIC EKG NON-CHARGEABLE

## 2023-09-25 PROCEDURE — 71045 X-RAY EXAM CHEST 1 VIEW: CPT | Mod: 26 | Performed by: RADIOLOGY

## 2023-09-25 PROCEDURE — 93654 COMPRE EP EVAL TX VT: CPT | Mod: GC | Performed by: INTERNAL MEDICINE

## 2023-09-25 PROCEDURE — 93005 ELECTROCARDIOGRAM TRACING: CPT

## 2023-09-25 RX ORDER — NALOXONE HYDROCHLORIDE 0.4 MG/ML
0.4 INJECTION, SOLUTION INTRAMUSCULAR; INTRAVENOUS; SUBCUTANEOUS
Status: DISCONTINUED | OUTPATIENT
Start: 2023-09-25 | End: 2023-09-25 | Stop reason: HOSPADM

## 2023-09-25 RX ORDER — LIDOCAINE 4 G/G
2 PATCH TOPICAL
Status: DISCONTINUED | OUTPATIENT
Start: 2023-09-25 | End: 2023-09-25

## 2023-09-25 RX ORDER — LIDOCAINE 40 MG/G
CREAM TOPICAL
Status: DISCONTINUED | OUTPATIENT
Start: 2023-09-25 | End: 2023-09-25 | Stop reason: HOSPADM

## 2023-09-25 RX ORDER — FENTANYL CITRATE 50 UG/ML
INJECTION, SOLUTION INTRAMUSCULAR; INTRAVENOUS
Status: DISCONTINUED | OUTPATIENT
Start: 2023-09-25 | End: 2023-09-25 | Stop reason: HOSPADM

## 2023-09-25 RX ORDER — NALOXONE HYDROCHLORIDE 0.4 MG/ML
0.2 INJECTION, SOLUTION INTRAMUSCULAR; INTRAVENOUS; SUBCUTANEOUS
Status: DISCONTINUED | OUTPATIENT
Start: 2023-09-25 | End: 2023-09-25 | Stop reason: HOSPADM

## 2023-09-25 RX ORDER — PROTAMINE SULFATE 10 MG/ML
INJECTION, SOLUTION INTRAVENOUS
Status: DISCONTINUED | OUTPATIENT
Start: 2023-09-25 | End: 2023-09-25 | Stop reason: HOSPADM

## 2023-09-25 RX ORDER — LEVOTHYROXINE SODIUM 100 UG/1
100 TABLET ORAL DAILY
Status: DISCONTINUED | OUTPATIENT
Start: 2023-09-25 | End: 2023-09-25 | Stop reason: HOSPADM

## 2023-09-25 RX ORDER — OXYCODONE HCL 10 MG/1
10 TABLET, FILM COATED, EXTENDED RELEASE ORAL
Status: COMPLETED | OUTPATIENT
Start: 2023-09-25 | End: 2023-09-25

## 2023-09-25 RX ORDER — LIDOCAINE 4 G/G
3 PATCH TOPICAL
Status: DISCONTINUED | OUTPATIENT
Start: 2023-09-25 | End: 2023-09-25 | Stop reason: HOSPADM

## 2023-09-25 RX ORDER — HEPARIN SODIUM 1000 [USP'U]/ML
INJECTION, SOLUTION INTRAVENOUS; SUBCUTANEOUS
Status: DISCONTINUED | OUTPATIENT
Start: 2023-09-25 | End: 2023-09-25 | Stop reason: HOSPADM

## 2023-09-25 RX ORDER — SODIUM CHLORIDE 9 MG/ML
INJECTION, SOLUTION INTRAVENOUS CONTINUOUS
Status: DISCONTINUED | OUTPATIENT
Start: 2023-09-25 | End: 2023-09-25 | Stop reason: HOSPADM

## 2023-09-25 RX ORDER — METOPROLOL SUCCINATE 50 MG/1
50 TABLET, EXTENDED RELEASE ORAL AT BEDTIME
Status: DISCONTINUED | OUTPATIENT
Start: 2023-09-25 | End: 2023-09-25 | Stop reason: HOSPADM

## 2023-09-25 RX ORDER — IOPAMIDOL 755 MG/ML
INJECTION, SOLUTION INTRAVASCULAR
Status: DISCONTINUED | OUTPATIENT
Start: 2023-09-25 | End: 2023-09-25 | Stop reason: HOSPADM

## 2023-09-25 RX ORDER — OXYCODONE AND ACETAMINOPHEN 5; 325 MG/1; MG/1
1 TABLET ORAL EVERY 4 HOURS PRN
Status: DISCONTINUED | OUTPATIENT
Start: 2023-09-25 | End: 2023-09-25 | Stop reason: HOSPADM

## 2023-09-25 RX ORDER — IPRATROPIUM BROMIDE AND ALBUTEROL SULFATE 2.5; .5 MG/3ML; MG/3ML
3 SOLUTION RESPIRATORY (INHALATION) ONCE
Status: DISCONTINUED | OUTPATIENT
Start: 2023-09-25 | End: 2023-09-25 | Stop reason: HOSPADM

## 2023-09-25 RX ADMIN — SODIUM CHLORIDE: 9 INJECTION, SOLUTION INTRAVENOUS at 08:49

## 2023-09-25 RX ADMIN — LIDOCAINE 3 PATCH: 560 PATCH PERCUTANEOUS; TOPICAL; TRANSDERMAL at 21:18

## 2023-09-25 RX ADMIN — OXYCODONE HYDROCHLORIDE 10 MG: 10 TABLET, FILM COATED, EXTENDED RELEASE ORAL at 20:41

## 2023-09-25 RX ADMIN — OXYCODONE HYDROCHLORIDE AND ACETAMINOPHEN 1 TABLET: 5; 325 TABLET ORAL at 19:09

## 2023-09-25 ASSESSMENT — ACTIVITIES OF DAILY LIVING (ADL)
ADLS_ACUITY_SCORE: 35

## 2023-09-25 NOTE — PROGRESS NOTES
Arrived to , [facility] [address], via litter from the cardiac cath lab s/p PVC ablation. Bilateral groin sites with figure of 8 stitch (venous) and angio-seal device to RFA. Bilateral sites are soft and flat to palpation with no bleeding or hematoma. She is alert and oriented x 4, sleepy but wakes up.  is at bedside. Flat bedrest for four hours, until 1830.

## 2023-09-25 NOTE — PROVIDER NOTIFICATION
Confirmed with JAIDEN Hough that patient can discontinue Metoprolol. NP states she will update in chart.

## 2023-09-25 NOTE — Clinical Note
dry, intact, no bleeding and no hematoma. 9fr LFV sheath removed figure of eight stitch used  8.5fr and 8fr RFV removed figure of eight stitch used   8fr RFA sheath removed, angio-seal closure device used

## 2023-09-25 NOTE — PROGRESS NOTES
Pre procedure complete. Awaiting consent. VSS, pt c/o 4/10 ongoing chest discomfort associated with PVCs. Pt appropriately NPO. Bilateral groin prepped. Pedal pulses marked. Max () will transport home post procedure.

## 2023-09-25 NOTE — DISCHARGE INSTRUCTIONS
Post-Ablation Discharge Instructions     For 24 hours:        Have an adult stay with you for 24 hours.        Relax and take it easy.        Drink plenty of fluids.        You may eat your normal diet, unless your doctor tells you otherwise.        Do NOT make any important or legal decisions.        Do NOT drive or operate machines at home or at work.        Do NOT drink alcohol.   Do NOT smoke.    Care of groin site:        Remove the Band-Aid after 24 hours. If there is minor oozing, apply another Band-aid and remove it after 12 hours.         Do NOT take a bath, use a hot tub, pool, or submerse in water for at least 3 days You may shower.         It is normal to have a small bruise or lump at the site.        Do not scrub the site.        Do not use lotion or powder near the puncture site for 3 days.        For the first 2 days: Do not stoop or squat. When you cough, sneeze or move your bowels, hold your hand over the puncture site and press gently.        Do not lift more than 10 pounds or exertional activity for 10 days.      If you start bleeding from the site in your groin:  Lie down flat and press firmly on the site.  Call your physician immediately, or, come to the emergency room.    Call 911 right away if you have bleeding that is heavy or does not stop.     Call your doctor/provider if:        You have a large or growing hard lump around the site.        The site is red, swollen, hot or tender.        Blood or fluid is draining from the site.        You have chills or a fever greater than 101 F (38 C).        Your leg turns bluish, feels numb or cool.        You have hives, a rash or unusual itching.     Cardiovascular Clinic:   [address] [place] [address]. [place] [address]    Your Care Team:  EP Cardiology   Telephone Number     Nurses:   Pamela GARSIA [phone]    After business hours: [phone], select option 4, and ask for EP Fellow on-call to be paged.   Non-procedure  scheduling:    Reina CHAUDHARY   [phone]   Procedure scheduling:    Gwen Hammonds   [phone]   Device Clinic (Pacemakers, ICDs, Loop Recorders)    During business hours: [phone]  After business hours:   [phone]- select option 4 and ask for job code [id number].       As always, Thank you for trusting us with your health care needs

## 2023-09-26 DIAGNOSIS — G89.18 ACUTE POST-OPERATIVE PAIN: Primary | ICD-10-CM

## 2023-09-26 LAB
ATRIAL RATE - MUSE: 56 BPM
ATRIAL RATE - MUSE: 61 BPM
ATRIAL RATE - MUSE: 68 BPM
DIASTOLIC BLOOD PRESSURE - MUSE: NORMAL MMHG
INTERPRETATION ECG - MUSE: NORMAL
P AXIS - MUSE: 32 DEGREES
P AXIS - MUSE: 51 DEGREES
P AXIS - MUSE: 52 DEGREES
PR INTERVAL - MUSE: 172 MS
PR INTERVAL - MUSE: 180 MS
PR INTERVAL - MUSE: 192 MS
QRS DURATION - MUSE: 100 MS
QRS DURATION - MUSE: 102 MS
QRS DURATION - MUSE: 98 MS
QT - MUSE: 420 MS
QT - MUSE: 454 MS
QT - MUSE: 456 MS
QTC - MUSE: 440 MS
QTC - MUSE: 446 MS
QTC - MUSE: 457 MS
R AXIS - MUSE: 54 DEGREES
R AXIS - MUSE: 67 DEGREES
R AXIS - MUSE: 68 DEGREES
SYSTOLIC BLOOD PRESSURE - MUSE: NORMAL MMHG
T AXIS - MUSE: 59 DEGREES
T AXIS - MUSE: 60 DEGREES
T AXIS - MUSE: 78 DEGREES
VENTRICULAR RATE- MUSE: 56 BPM
VENTRICULAR RATE- MUSE: 61 BPM
VENTRICULAR RATE- MUSE: 68 BPM

## 2023-09-26 RX ORDER — OXYCODONE AND ACETAMINOPHEN 5; 325 MG/1; MG/1
1-2 TABLET ORAL EVERY 6 HOURS PRN
Qty: 12 TABLET | Refills: 0 | Status: SHIPPED | OUTPATIENT
Start: 2023-09-26 | End: 2023-09-29

## 2023-09-26 NOTE — PROGRESS NOTES
Dr. Plummer with Cardiology came to bedside to see patient. Asked if patient wanted to spend the night in the hospital, patient declined, was hoping for a pain reliever to have at the hotel in case pain came back during the night. Dr. Plummer said he was not able to send her home with a prescription but he could give provide pain relief measures while in the hospital. Patient was able to walk around unit with , gait steady, slow, and controlled. She continued to be adamant about not staying in the hospital. Plan: give PO oxycontin for extended pain relief coverage, use lidocaine patches around bilateral groin sites for pain relief. Oxycontin given as ordered. Lidocaine patches applied prior to discharge. Patient and  instructed to go to ER or call 911 for increased pain that is not controlled with Tylenol, ibuprofen, and ice packs, or that affects patient's mobility; both verbalized understanding. Patient and  have verbalized understanding of discharge instructions, all questions answered. Patient assisted to main entrance via wheelchair to meet  with car for discharge back to the hotel and home tomorrow. PIV removed prior to discharge. Bilateral groin sites remain soft and flat to palpation with no bleeding or hematoma.

## 2023-09-26 NOTE — PROGRESS NOTES
"Suture removed from bilateral groin sites at 1825. Bilateral sites were soft and flat to palpation with no bleeding or hematoma. Patient assisted to restroom holding 's hand, she voided without issue. Upon returning back to bed, patient sat on edge of bed, took in a loud gasp of air and then said she could not breathe. She began crying, face was red; she was assisted to lay back in bed and reconnected to monitor. BP more elevated compared to rest of recovery. Heart rate in the 90-100s compared to 50s earlier. Oxygen applied and sats were 100%. Gradually patient appeared more calm and less tearful. Percocet given. EKG performed. CXR ordered and completed. SBP ranging from 120-140s. Heart rate in the 60s. Patient states bilateral groin sites still hurt, chest pain is less. Patient states she does not feel any different with oxygen versus without, she is now on room air. She is sitting up in bed about 45-60 degrees. She continues to be tearful at times but more calm. Her movements in bed appear stiff and guarded. BP (!) 147/93   Pulse 67   Temp 98.2  F (36.8  C)   Resp 18   Ht 1.689 m (5' 6.5\")   Wt 74.7 kg (164 lb 11.2 oz)   SpO2 97%   BMI 26.19 kg/m      "

## 2023-09-26 NOTE — PROGRESS NOTES
EP paged for dyspnea and groin pain post procedure. CXR and EKG unremarkable. Stable vitals. General cardiology fellow obed evaluated the patient. Spoke with RN taking care of patient, patient now is more calm and breathing better. Groin without hematoma. Treated for pain with oxycodone and percocet. Symptoms appear to be 2/2 anxiety and pain, now much improved. Patient can be discharged home today and to return to ED if new or worsening symptoms.     Mariam Castaneda  PGY-7, EP

## 2023-09-30 DIAGNOSIS — E03.4 HYPOTHYROIDISM DUE TO ACQUIRED ATROPHY OF THYROID: ICD-10-CM

## 2023-10-02 RX ORDER — LEVOTHYROXINE SODIUM 100 UG/1
TABLET ORAL
Qty: 90 TABLET | Refills: 3 | Status: SHIPPED | OUTPATIENT
Start: 2023-10-02 | End: 2023-10-23 | Stop reason: DRUGHIGH

## 2023-10-19 NOTE — PROGRESS NOTES
"  Assessment & Plan     1. Paroxysmal ventricular tachycardia (H)  Cardiology notes reviewed   - Adult Leadless EKG Monitor 8 to 14 Days; Future    2. Nonischemic cardiomyopathy (H)  As above  - Echocardiogram Complete; Future    3. Hypothyroidism due to acquired atrophy of thyroid  Dose increase, repeat TSH in 6-8 weeks   - TSH with free T4 reflex; Future  - levothyroxine (SYNTHROID/LEVOTHROID) 112 MCG tablet; Take 1 tablet (112 mcg) by mouth daily  Dispense: 30 tablet; Refill: 1    4. Encounter for IUD insertion  - Ob/Gyn  Referral    5. Encounter for IUD removal  - Ob/Gyn  Referral    6. Nicotine dependence, uncomplicated, unspecified nicotine product type  Cessation encouraged- optiosn reviewed.   - SMOKING CESSATION COUNSELING 3-10 MIN           Nicotine/Tobacco Cessation:  She reports that she has been smoking cigarettes. She has a 8.50 pack-year smoking history. She has never used smokeless tobacco.  Nicotine/Tobacco Cessation Plan:   Information offered: Patient not interested at this time      BMI:   Estimated body mass index is 26.53 kg/m  as calculated from the following:    Height as of this encounter: 1.689 m (5' 6.5\").    Weight as of this encounter: 75.7 kg (166 lb 14.4 oz).       Follow-up after cardiac echo and zio patch     Zohreh Aguila NP  [facility] [place] [facility]    Subjective   Manju is a 38 year old, presenting for the following health issues:  Thyroid Problem        10/23/2023    10:32 AM   Additional Questions   Roomed by sandy   Accompanied by        HPI     Hypothyroidism Follow-up    Since last visit, patient describes the following symptoms: Weight stable, no hair loss, no skin changes, no constipation, no loose stools      Needs IUD replaced.  Referral placed today.     Recent Labs   Lab Test 09/25/23  0707 09/07/23  1115 01/11/23  1120 10/13/21  1231 03/08/21  1148   LDL  --  58  --   --  82   HDL  --  37*  --   --  36*   TRIG  --  93  --   " "--  72   ALT  --  25  --   --   --    CR 0.80 0.66  --    < > 0.72   GFRESTIMATED >90 >90  --    < > >90   GFRESTBLACK  --   --   --   --  >90   POTASSIUM 4.0 4.2  --    < > 4.1   TSH  --  3.14 3.09   < > 4.09*    < > = values in this interval not displayed.      BP Readings from Last 3 Encounters:   10/23/23 122/80   09/25/23 133/85   09/07/23 132/77    Wt Readings from Last 3 Encounters:   10/23/23 75.7 kg (166 lb 14.4 oz)   09/25/23 74.7 kg (164 lb 11.2 oz)   09/07/23 76 kg (167 lb 8 oz)                        Review of Systems   Constitutional, HEENT, cardiovascular, pulmonary, gi and gu systems are negative, except as otherwise noted.      Objective    /80 (BP Location: Left arm, Patient Position: Chair, Cuff Size: Adult Regular)   Pulse 67   Temp 97.4  F (36.3  C) (Tympanic)   Resp 16   Ht 1.689 m (5' 6.5\")   Wt 75.7 kg (166 lb 14.4 oz)   SpO2 97%   BMI 26.53 kg/m    Body mass index is 26.53 kg/m .  Physical Exam   GENERAL: healthy, alert and no distress  RESP: lungs clear to auscultation - no rales, rhonchi or wheezes  CV: regular rate and rhythm, normal S1 S2, no S3 or S4, no murmur,   MS: no gross musculoskeletal defects noted, no edema  PSYCH: mentation appears normal, affect normal/bright                      "

## 2023-10-23 ENCOUNTER — OFFICE VISIT (OUTPATIENT)
Dept: FAMILY MEDICINE | Facility: OTHER | Age: 38
End: 2023-10-23
Attending: NURSE PRACTITIONER
Payer: COMMERCIAL

## 2023-10-23 VITALS
DIASTOLIC BLOOD PRESSURE: 80 MMHG | SYSTOLIC BLOOD PRESSURE: 122 MMHG | TEMPERATURE: 97.4 F | RESPIRATION RATE: 16 BRPM | OXYGEN SATURATION: 97 % | BODY MASS INDEX: 26.19 KG/M2 | HEIGHT: 67 IN | WEIGHT: 166.9 LBS | HEART RATE: 67 BPM

## 2023-10-23 DIAGNOSIS — F17.200 NICOTINE DEPENDENCE, UNCOMPLICATED, UNSPECIFIED NICOTINE PRODUCT TYPE: ICD-10-CM

## 2023-10-23 DIAGNOSIS — Z30.430 ENCOUNTER FOR IUD INSERTION: ICD-10-CM

## 2023-10-23 DIAGNOSIS — I42.8 NONISCHEMIC CARDIOMYOPATHY (H): ICD-10-CM

## 2023-10-23 DIAGNOSIS — I47.29 PAROXYSMAL VENTRICULAR TACHYCARDIA (H): Primary | ICD-10-CM

## 2023-10-23 DIAGNOSIS — E03.4 HYPOTHYROIDISM DUE TO ACQUIRED ATROPHY OF THYROID: ICD-10-CM

## 2023-10-23 DIAGNOSIS — Z30.432 ENCOUNTER FOR IUD REMOVAL: ICD-10-CM

## 2023-10-23 PROCEDURE — 99214 OFFICE O/P EST MOD 30 MIN: CPT | Performed by: NURSE PRACTITIONER

## 2023-10-23 RX ORDER — LEVOTHYROXINE SODIUM 112 UG/1
112 TABLET ORAL DAILY
Qty: 30 TABLET | Refills: 1 | Status: SHIPPED | OUTPATIENT
Start: 2023-10-23 | End: 2024-01-15

## 2023-10-23 ASSESSMENT — PAIN SCALES - GENERAL: PAINLEVEL: NO PAIN (0)

## 2023-10-23 NOTE — PATIENT INSTRUCTIONS
"  Assessment & Plan     1. Paroxysmal ventricular tachycardia (H)  Cardiology notes reviewed   - Adult Leadless EKG Monitor 8 to 14 Days; Future    2. Nonischemic cardiomyopathy (H)  As above  - Echocardiogram Complete; Future    3. Hypothyroidism due to acquired atrophy of thyroid  Dose increase, repeat TSH in 6-8 weeks   - TSH with free T4 reflex; Future  - levothyroxine (SYNTHROID/LEVOTHROID) 112 MCG tablet; Take 1 tablet (112 mcg) by mouth daily  Dispense: 30 tablet; Refill: 1    4. Encounter for IUD insertion  - Ob/Gyn  Referral    5. Encounter for IUD removal  - Ob/Gyn  Referral    6. Nicotine dependence, uncomplicated, unspecified nicotine product type  Cessation encouraged- optiosn reviewed.   - SMOKING CESSATION COUNSELING 3-10 MIN           Nicotine/Tobacco Cessation:  She reports that she has been smoking cigarettes. She has a 8.50 pack-year smoking history. She has never used smokeless tobacco.  Nicotine/Tobacco Cessation Plan:   Information offered: Patient not interested at this time      BMI:   Estimated body mass index is 26.53 kg/m  as calculated from the following:    Height as of this encounter: 1.689 m (5' 6.5\").    Weight as of this encounter: 75.7 kg (166 lb 14.4 oz).       Follow-up after cardiac echo and zio patch     Zohreh Aguila NP  [facility]  "

## 2023-10-25 ENCOUNTER — HOSPITAL ENCOUNTER (OUTPATIENT)
Dept: CARDIOLOGY | Facility: HOSPITAL | Age: 38
Discharge: HOME | End: 2023-10-25
Attending: NURSE PRACTITIONER | Admitting: INTERNAL MEDICINE
Payer: COMMERCIAL

## 2023-10-25 DIAGNOSIS — I47.29 PAROXYSMAL VENTRICULAR TACHYCARDIA (H): ICD-10-CM

## 2023-10-25 PROCEDURE — 93246 EXT ECG>7D<15D RECORDING: CPT

## 2023-10-25 PROCEDURE — 93248 EXT ECG>7D<15D REV&INTERPJ: CPT | Performed by: INTERNAL MEDICINE

## 2023-10-25 NOTE — PATIENT INSTRUCTIONS
Zio patch placed on patient in outpatient appointment today. Patient was instructed to wear patch for 14 days. Skin was prepped and patch was placed following [employer] guidelines.     Patient was instructed to push button when symptomatic and fill out diary. Patient was instructed not to submerse in water and no swimming, saunas, or hot tubs. Showers may be taken keeping back towards the water. If the area DOES become wet pat it dry with a cloth. If skin irritation occurs patient was instructed to remove patch and contact [employer].    Patient understands we do not receive results until they mail patch back inside the box. Patient was made aware phone number is required for enrollment which automatically enrolls patient into text messaging program and they may receive automated phone calls. Patient can opt out at anytime. Staff reminded patient of outside billing notice which was explained to patient during the scheduling process of this monitor. Patient also instructed to contact [employer] with any questions [phone].    Patient had no further questions and agreed with plan. Patient was sent home with the box, information pamphlet and booklet to sonia any incidents in.

## 2023-11-01 ENCOUNTER — ALLIED HEALTH/NURSE VISIT (OUTPATIENT)
Dept: FAMILY MEDICINE | Facility: OTHER | Age: 38
End: 2023-11-01
Attending: NURSE PRACTITIONER
Payer: COMMERCIAL

## 2023-11-01 DIAGNOSIS — Z23 NEED FOR INFLUENZA VACCINATION: Primary | ICD-10-CM

## 2023-11-01 PROCEDURE — 90471 IMMUNIZATION ADMIN: CPT

## 2023-11-01 PROCEDURE — 90686 IIV4 VACC NO PRSV 0.5 ML IM: CPT

## 2023-11-07 ENCOUNTER — MYC REFILL (OUTPATIENT)
Dept: FAMILY MEDICINE | Facility: OTHER | Age: 38
End: 2023-11-07

## 2023-11-07 DIAGNOSIS — R06.2 WHEEZING: ICD-10-CM

## 2023-11-08 RX ORDER — FLUTICASONE PROPIONATE AND SALMETEROL 100; 50 UG/1; UG/1
1 POWDER RESPIRATORY (INHALATION) EVERY 12 HOURS
Qty: 60 EACH | Refills: 1 | Status: SHIPPED | OUTPATIENT
Start: 2023-11-08

## 2023-11-08 NOTE — TELEPHONE ENCOUNTER
Fluticasone-salmeterol 100-50 MCG/ACT      Last Written Prescription Date:  8-14-23  Last Fill Quantity: 60,   # refills: 0  Last Office Visit: 10-23-23  Future Office visit:    Next 5 appointments (look out 90 days)      Nov 16, 2023 10:00 AM  (Arrive by 9:45 AM)  Office Visit with Bigg Thornton MD  [facility] ([facility] ) [address] [place] [id number] [phone]

## 2023-11-16 ENCOUNTER — OFFICE VISIT (OUTPATIENT)
Dept: OBGYN | Facility: OTHER | Age: 38
End: 2023-11-16
Attending: OBSTETRICS & GYNECOLOGY
Payer: COMMERCIAL

## 2023-11-16 VITALS
RESPIRATION RATE: 16 BRPM | HEIGHT: 67 IN | WEIGHT: 166 LBS | HEART RATE: 68 BPM | SYSTOLIC BLOOD PRESSURE: 110 MMHG | DIASTOLIC BLOOD PRESSURE: 66 MMHG | BODY MASS INDEX: 26.06 KG/M2 | TEMPERATURE: 97.8 F

## 2023-11-16 DIAGNOSIS — Z30.433 ENCOUNTER FOR REMOVAL AND REINSERTION OF INTRAUTERINE CONTRACEPTIVE DEVICE: Primary | ICD-10-CM

## 2023-11-16 PROBLEM — O09.899 HIGH RISK PREGNANCY WITH LOW PAPPA (PREGNANCY-ASSOCIATED PLASMA PROTEIN A): Status: RESOLVED | Noted: 2018-02-08 | Resolved: 2023-11-16

## 2023-11-16 PROBLEM — O40.3XX0 POLYHYDRAMNIOS AFFECTING PREGNANCY IN THIRD TRIMESTER: Status: RESOLVED | Noted: 2018-06-16 | Resolved: 2023-11-16

## 2023-11-16 PROBLEM — O16.3 HTN COMPLICATING PERIPREGNANCY, ANTEPARTUM, THIRD TRIMESTER: Status: RESOLVED | Noted: 2018-06-16 | Resolved: 2023-11-16

## 2023-11-16 PROBLEM — Z87.59 HISTORY OF PRE-ECLAMPSIA: Status: RESOLVED | Noted: 2018-02-08 | Resolved: 2023-11-16

## 2023-11-16 PROBLEM — O28.0 HIGH RISK PREGNANCY WITH LOW PAPPA (PREGNANCY-ASSOCIATED PLASMA PROTEIN A): Status: RESOLVED | Noted: 2018-02-08 | Resolved: 2023-11-16

## 2023-11-16 PROBLEM — O36.8390 FETAL ARRHYTHMIA AFFECTING PREGNANCY, ANTEPARTUM: Status: RESOLVED | Noted: 2018-06-16 | Resolved: 2023-11-16

## 2023-11-16 PROCEDURE — 58301 REMOVE INTRAUTERINE DEVICE: CPT | Performed by: OBSTETRICS & GYNECOLOGY

## 2023-11-16 PROCEDURE — 58300 INSERT INTRAUTERINE DEVICE: CPT | Performed by: OBSTETRICS & GYNECOLOGY

## 2023-11-16 ASSESSMENT — PAIN SCALES - GENERAL: PAINLEVEL: NO PAIN (0)

## 2023-11-16 NOTE — PROCEDURES
"GYN Procedure Note     PROCEDURE PERFORMED  IUD Removal and Insertion of new IUD  IUD Type: Mirena    INDICATION  1 IUD removal and insertion of new IUD.  2 Patient desires long term reversible contraception.    OBJECTIVE  /66   Pulse 68   Temp 97.8  F (36.6  C)   Resp 16   Ht 1.692 m (5' 6.6\")   Wt 75.3 kg (166 lb)   BMI 26.31 kg/m      Please see separate note for details of physical examination.    PROCEDURE  I reviewed the risks, benefits, alternatives, indication and expected outcome of IUD removal and insertion of new IUD with the patient. The patient verbalized understanding and desired to proceed. Consent form was signed.  After informed consent was obtained from the patient, the patient was prepped and draped in the usual fashion. A speculum was placed in the vagina to visualize the cervix. The cervix was then swabbed with betadine. The IUD strings are visible. A Ring forceps were used to grasp the IUD strings and with gentle traction, the Mirena IUD was removed. This was inspected and found to be intact. The Mirena IUD was shown to the patient.    The cervix was then swabbed with betadine again. A single tooth tenaculum was applied to the anterior cervical lip. An endometrial pipelle was passed through the cervical os into the uterine cavity and the uterus sounded to 8 cm. The Mirena device was opened, inspected and found to be intact. The Mirena insertion device was set to the 8 cm sonia. The introduction catheter was passed through the cervical os into the endometrial cavity and the Mirena IUD was deployed at the 8 cm sonia in the usual fashion. The IUD introduction catheter was removed. The IUD strings were trimmed to 3 cm length. The tenaculum was removed from the cervix and the cervix was hemostatic.  All instruments were removed from the vagina. There were no complications. The patient tolerated the procedure well with a minimal amount of spotting and cramping noted.    Chaperone: Kay " Patrick ESPOSITON    Bigg Thornton MD  Obstetrics and Gynecology

## 2023-11-16 NOTE — PROGRESS NOTES
CHIEF COMPLAINT / REASON FOR VISIT  Patient requests IUD replacement for long term contraction.    HISTORY OF PRESENT ILLNESS  Manju Weiss is a 38 year old  with No LMP recorded. (Menstrual status: IUD). who presents requesting IUD replacement for long-term contraception. She has a Mirena IUD which was placed on 2018. She would like to remove her current IUD, which is expiring, and replace it with a new Mirena IUD. She is happy with her current IUD and denies any problems. She is amenorrheic.  She smokes 1/2 pack/day.    MENSTRUAL HISTORY  Menarche was at age 14.  Menses: Amenorrhea with Mirena IUD  Intermenstrual bleeding: Denies.  Dysmenorrhea: Denies.  She is sexually active and denies issues with intercourse.   Dyspareunia: Denies.  Postcoital spotting: Denies.  Current contraception: Mirena IUD.  STD History: No STD history.  Last Pap smear history: Normal.  Mammogram history: N/A.    ALLERGIES     Allergies   Allergen Reactions    Sulfa Antibiotics Shortness Of Breath    Latex Hives, Itching and Rash       MEDICATIONS    Current Outpatient Medications:     albuterol (PROAIR HFA/PROVENTIL HFA/VENTOLIN HFA) 108 (90 Base) MCG/ACT inhaler, Inhale 2 puffs into the lungs every 4 hours as needed for shortness of breath / dyspnea or wheezing, Disp: 18 g, Rfl: 1    APPLE CIDER VINEGAR PO, , Disp: , Rfl:     fluticasone-salmeterol (WIXELA INHUB) 100-50 MCG/ACT inhaler, Inhale 1 puff into the lungs every 12 hours, Disp: 60 each, Rfl: 1    levonorgestrel (MIRENA) 20 MCG/24HR IUD, 1 each by Intrauterine route once, Disp: , Rfl:     levonorgestrel (MIRENA) 52 MG (20 mcg/day) IUD, 1 each by Intrauterine route once, Disp: , Rfl:     levothyroxine (SYNTHROID/LEVOTHROID) 112 MCG tablet, Take 1 tablet (112 mcg) by mouth daily, Disp: 30 tablet, Rfl: 1    multivitamin w/minerals (THERA-VIT-M) tablet, Take 1 tablet by mouth daily, Disp: , Rfl:   No current facility-administered medications for this  "visit.    REVIEW OF SYSTEMS  As per HPI otherwise negative.    The patient's Medical Hx, Surgical Hx, Obstetrical Hx, Social Hx, and Family Hx have been reviewed and updated in the electronic medical record.    OBJECTIVE  /66   Pulse 68   Temp 97.8  F (36.6  C)   Resp 16   Ht 1.692 m (5' 6.6\")   Wt 75.3 kg (166 lb)   BMI 26.31 kg/m      General:  Well-developed, well-nourished female in no apparent distress.  Neurological: Alert and oriented x3.  Lungs:  Clear to auscultation bilaterally with good inspiratory effort.  No wheezing rhonchi or rales noted. Breathing nonlabored.  Heart:  Regular rate and rhythm without murmur. No JVD.  No peripheral vascular disease.  Abdomen: Soft, nontender, nondistended, positive bowel sounds.  No organomegaly. No rebound, no guarding.  Pelvic exam:  Normal external female genitalia without lesions or abnormalities. Normal pubic hair distribution. Urethral meatus normal in appearance and without masses. Normal Bartholin, Urethral and Ouzinkie's glands. Vaginal mucosa is pink and moist with a small amount of physiologic discharge present in the posterior vaginal fornix. No vaginal lesions.  Normal multiparous cervix without lesions or abnormalities. No cervical motion tenderness to palpation. IUD strings are visible. The bladder and urethra are nontender to palpation. Uterus is normal size, shape, consistency, anteflexed, mobile, and nontender. Uterine descent is minimal.  No adnexal masses or tenderness bilaterally.  Rectal exam:  No external hemorrhoids noted. No rectovaginal nodularity noted. Examination confirms the vaginal exam.  Extremities:  No clubbing cyanosis or edema. Nontender bilaterally.     Chaperone: Kay Nguyen LPN    DIAGNOSTICS  2022 Pap NILM, negative HPV    PROCEDURE:  A Mirena IUD was removed. Then a new Mirena IUD was inserted/ replaced. Please see separate note for details.    ASSESSMENT / PLAN  Manju Weiss is a 38 year old  female " who presents requesting IUD replacement for long-term contraception.    1 Contraception counseling  - The patient is currently using Mirena IUD for contraception. She requests IUD replacement.  - I discussed contraception options available to the patient including oral contraceptive pills both continuous and cyclic, Ortho Evra patch, NuvaRing, Depo-Provera injections, Nexplanon, Mirena IUD, ParaGard IUD, Nicolle IUD, and condoms.  I reviewed the risks, benefits, alternatives, indications and side effects of each of these contraception options with the patient.  - The patient requests a new Mirena IUD for contraception.  - The risks, benefits, alternative and indications of intrauterine device (IUD) were discussed with the patient. I discussed risk of bleeding and postcoital spotting with an IUD. I discussed the risk of amenorrhea with the Mirena IUD.  I discussed increased risk of intrauterine infection and sexually transmitted disease with an IUD.  I discussed the risk of pain and cramping, uterine perforation and dyspareunia. I discussed the risk of IUD expulsion. I discussed that the IUD does not protect against sexually transmitted disease and that condom usage is recommended for STD prevention. I discussed the risk that pregnancy can occur with the IUD in place and to alert us if she has symptoms of pregnancy and a positive pregnancy test. I discussed the increased risk of ectopic pregnancy with an IUD. The patient verbalizes understanding and desires to proceed.  - I recommend the patient remain abstinent for at least 7 days to allow proper healing after IUD placement.  - The patient should follow back up in clinic if she develops any problems after IUD placement.  - I reviewed IUD string checks with the patient.  - I reviewed with the patient that the Mirena IUD is effective for 5 years and will need to be removed or replaced 5 years from the insertion date.  - I reviewed sexually transmitted disease  precautions with the patient.  - All the patient's questions were answered.    2 IUD replacement  - I discussed expected outcome, risk, benefits, alternatives, indication of IUD removal and replacement with a new IUD device.  The patient verbalizes understanding and desires to proceed.  - The consent form was reviewed and signed.  - A Mirena IUD was removed and a new Mirena IUD was inserted/ replaced as mentioned above without difficulty. The patient tolerated the procedure well.  - Please see separate note for details.    - Problem list reviewed and updated.  - Follow up annually or sooner as needed.    Bigg Thornton MD  Obstetrics and Gynecology

## 2023-11-20 ENCOUNTER — TELEPHONE (OUTPATIENT)
Dept: CARDIOLOGY | Facility: CLINIC | Age: 38
End: 2023-11-20
Payer: COMMERCIAL

## 2023-11-20 NOTE — TELEPHONE ENCOUNTER
----- Message from Ayde Farris RN sent at 11/20/2023  8:35 AM CST -----  Yes she can see Cristine   ----- Message -----  From: Meli Novak LPN  Sent: 11/17/2023   1:26 PM CST  To: Ayde Farris RN    I have her on my list for EP follow up with Rivera in Dec (julissa or rodney put her on it) can she see Ana instead. Looks like echo is mid dec.

## 2023-12-04 ENCOUNTER — LAB (OUTPATIENT)
Dept: LAB | Facility: OTHER | Age: 38
End: 2023-12-04
Payer: COMMERCIAL

## 2023-12-04 DIAGNOSIS — E03.4 HYPOTHYROIDISM DUE TO ACQUIRED ATROPHY OF THYROID: ICD-10-CM

## 2023-12-04 LAB
HOLD SPECIMEN: NORMAL
TSH SERPL DL<=0.005 MIU/L-ACNC: 2.14 UIU/ML (ref 0.3–4.2)

## 2023-12-04 PROCEDURE — 36415 COLL VENOUS BLD VENIPUNCTURE: CPT

## 2023-12-04 PROCEDURE — 84443 ASSAY THYROID STIM HORMONE: CPT

## 2023-12-15 NOTE — TELEPHONE ENCOUNTER
LM for patient stating I will make her ablation follow up appt. She will see it on her mychart. Call if questions or to reschedule. Number provided.

## 2023-12-18 ENCOUNTER — HOSPITAL ENCOUNTER (OUTPATIENT)
Dept: CARDIOLOGY | Facility: HOSPITAL | Age: 38
Discharge: HOME | End: 2023-12-18
Attending: NURSE PRACTITIONER | Admitting: INTERNAL MEDICINE
Payer: COMMERCIAL

## 2023-12-18 DIAGNOSIS — I42.8 NONISCHEMIC CARDIOMYOPATHY (H): ICD-10-CM

## 2023-12-18 LAB — LVEF ECHO: NORMAL

## 2023-12-18 PROCEDURE — 93306 TTE W/DOPPLER COMPLETE: CPT

## 2023-12-18 PROCEDURE — 93306 TTE W/DOPPLER COMPLETE: CPT | Mod: 26 | Performed by: INTERNAL MEDICINE

## 2024-01-08 NOTE — PROGRESS NOTES
ELECTROPHYSIOLOGY CLINIC VISIT    Assessment/Recommendations   Assessment/Plan:    Ms. Weiss is a 38 year old female who has a past medical history significant for PVCs/NSVT, borderline LV dilation, hypothyroidism, pre-eclampsia, and anxiety.      Premature Ventricular Contractions:   We had a long discussion with patient regarding PVCs. We discussed various options for treatment of PVCs. In a structurally normal heart, PVCs are considered relatively benign. Treatment is therefore indicated primarily for relief of symptoms or if associated with a cardiomyopathy. She does have some mild LV dilation on echo and CMR showing mildly reduced LVEF 44% with no LGE.  Medications such as calcium channel blockers or beta blockers in some cases reduce the frequency and duration of the episodes. She tried Toprol XL which helped some with symptoms but still had daily symptoms. The other option discussed was an electrophysiology study (EPS) and possible ablation. The procedural risk of EPS and PVC ablation were discussed in detail. We decided to pursue ablation given mildly reduced LVEF and symptoms. She underwent ablation of PVCs originating from the LV summit on 9/25/23. Repeat zio done 10/25-11/8/23 sinus  bpm, <1% PVC burden. Symptoms with PVCs have improved, repeat echo done 12/18/2023 with LVEF mildly reduced 45-50%. Discussed GDMT initiation with LVEF mildly reduced despite PVC ablation and reduced burden on repeat zio.     Start Toprol XL 12.5 mg daily.   She is going to think about genetic testing, currently defers.   Follow up with cardiology for further med titration.      History of Present Illness/Subjective    Ms. Manju Weiss is a 38 year old female who comes in today for EP follow-up s/p PVC ablation.    She was recently seen by Cardiology for palpitations that she reports as fluttering/skipping sensation in her chest sometimes with lightheadedness/presyncope. Symptoms are most prominent at rest.  She does have a history of syncope as a teenager during a time when she had an eating disorder. She had an echo in 12/2022 which showed mild LV dilation, LVEF 55-60%, normal RV size/function, and mild/modearte LA enlargement. She had a NM stress test in 1/2023 that was negative for ischemia and showed LV enlargement, mild global hypokinesis, and LVEF 51%. A zio patch monitor from 11/8/22-11/22/22 showed 29 NSVT up to 11 beats and 10.9% PVC burden (one dominant morphology) and 61 symptom activations mostly corresponded to PVCs. She was placed on Toprol XL and referred to EP for PVC evaluation and management.      EP Visit 1/20/23: She reports feeling at baseline. She continues to have some PVC symptoms. Feels Metoprolol has helped some. She denies chest discomfort, abdominal fullness/bloating or peripheral edema, shortness of breath, paroxysmal nocturnal dyspnea, orthopnea, or syncope. No known family history of SCD or early heart disease. Her mother was diagnosed with lung cancer and subsequent HF recently. She has been ordered for a CMR which has not been completed yet. TSH was recently checked and normal. She denies any significant ETOH or caffeine intake. She is worried about the dilated LV on imaging and implications of PVCs on her heart. Presenting 12 lead ECG shows NSR Vent Rate 65 bpm,  ms,  ms, QTc 438 ms.  Current cardiac medications include: Toprol XL.      EP Visit 6/16/23: She presents today for follow up. She had a CMR at OSH. We were able to get these images and reviewed with Dr. Vega.   This showed mildly reduced LVEF 44%, no LGE. She reports continuing to have daily PVC symptoms. She denies chest discomfort, abdominal fullness/bloating or peripheral edema, shortness of breath, paroxysmal nocturnal dyspnea, orthopnea, or syncope. Current cardiac medications include: Toprol XL.     She presents today for follow up. She reports recovery after ablation was slow healing, still notes  occasional groin pain when exercising, however, it has improved significantly. Took several weeks to return to her daily activities. She has not noted return of symptoms associated with PVCs. Repeat echo done 12/18 with LVEF 45-50%. She denies chest discomfort, palpitations, peripheral edema, shortness of breath, paroxysmal nocturnal dyspnea, orthopnea, lightheadedness, dizziness or syncope. Presenting 12 lead ECG shows sinus Vent Rate 59 bpm,  ms, QRS 96 ms, QTc 431 ms.     Family history:    - Mom: CHF, hx of breast cancer with radiation/chemo   - Dad: no known cardiac hx, not in contact    - Half sister: no heart, factor V leiden    - full sister: no heart, TTP, no known heart issues   I have reviewed and updated the patient's Past Medical History, Social History, Family History and Medication List.     Cardiographics (Personally Reviewed) :   Zio monitor:  10/25-11/8/23 sinus  bpm, <1% PVC burden     Echo: 12/18/2023   Interpretation Summary  No pericardial effusion is present.  Left ventricular function is decreased. The ejection fraction is 45-50%  (mildly reduced).  No regional wall motion abnormalities are seen.  Global right ventricular function is normal.  Mild left atrial enlargement is present.  Trace to mild mitral insufficiency is present.  No aortic stenosis is present.  Trace to mild tricuspid insufficiency is present.  This study was compared with the study from 12/8/22 .  LV function has minimally worsened since previous studyy    12/8/22 Echo:   Interpretation Summary  Global and regional left ventricular function is normal with an EF of 55-60%.  Mild left ventricular dilation is present.  Right ventricular function, chamber size, wall motion, and thickness are  normal.  Mild to moderate left atrial enlargement is present.  The atrial septum is intact as assessed by color Doppler and agitated saline  bubble study .  Trace to mild mitral insufficiency is present.  Trace tricuspid  "insufficiency is present.  Trace pulmonic insufficiency is present.    1/5/23 NM Stress Test:     The nuclear stress test is abnormal.    The left ventricular ejection fraction at rest is 51%.  The left ventricular ejection fraction at stress is 58%.    There is no prior study for comparison.  enlarged left ventricle with global mild hypokinesis.  No reversible ischemia     4/14/23 CMR (OSH Report):  IMPRESSION:   1.   Findings consistent with non-ischemic dilated cardiomyopathy.   2.   Mildly dilated left ventricle with mildly reduced systolic function; LVEF = 47.7%.   3.   Mildly dilated right ventricle with mildly reduced RVEF = 49%.   4.   No myocardial infarction or fibrosis.        Physical Examination   /68 (BP Location: Right arm, Patient Position: Chair, Cuff Size: Adult Regular)   Pulse 73   Ht 1.689 m (5' 6.5\")   Wt 74.4 kg (164 lb)   SpO2 97%   BMI 26.07 kg/m    Wt Readings from Last 3 Encounters:   01/09/24 74.4 kg (164 lb)   11/16/23 75.3 kg (166 lb)   10/23/23 75.7 kg (166 lb 14.4 oz)     General Appearance:   Alert, well-appearing and in no acute distress.   HEENT: Atraumatic, normocephalic. MMM.   Chest/Lungs:   Respirations unlabored.  Lungs are clear to auscultation.   Cardiovascular:   Regular rate and rhythm.  S1/S2. No murmur.    Abdomen:  Soft, nontender, nondistended.   Extremities: No cyanosis or clubbing. No edema.    Musculoskeletal: Moves all extremities.     Skin: Warm, dry, intact.    Neurologic: Mood and affect are appropriate.  Alert and oriented to person, place, time, and situation.          Medications  Allergies   No cardiac meds     Albuterol   Fluticasone inhaler   Synthroid   Allergies   Allergen Reactions    Sulfa Antibiotics Shortness Of Breath    Latex Hives, Itching and Rash         Lab Results (Personally Reviewed)    Chemistry/lipid CBC Cardiac Enzymes/BNP/TSH/INR   Lab Results   Component Value Date    BUN 13.3 09/25/2023     09/25/2023    CO2 23 " 09/25/2023     Creatinine   Date Value Ref Range Status   09/25/2023 0.80 0.51 - 0.95 mg/dL Final   03/08/2021 0.72 0.52 - 1.04 mg/dL Final       Lab Results   Component Value Date    CHOL 114 09/07/2023    HDL 37 (L) 09/07/2023    LDL 58 09/07/2023      Lab Results   Component Value Date    WBC 9.5 09/25/2023    HGB 15.4 09/25/2023    HCT 45.5 09/25/2023    MCV 94 09/25/2023     09/25/2023    Lab Results   Component Value Date    TSH 2.14 12/04/2023    INR 1.16 (H) 09/25/2023        The patient states understanding and is agreeable with the plan.     Cristine Bhatti PA-C  [facility]  Electrophysiology Consult Service  Pager: [phone]    I spent a total of 30 minutes face to face with Manju Weiss during today's office visit. I have spent an additional 20 minutes today on chart review and documentation.

## 2024-01-09 ENCOUNTER — OFFICE VISIT (OUTPATIENT)
Dept: CARDIOLOGY | Facility: OTHER | Age: 39
End: 2024-01-09
Payer: COMMERCIAL

## 2024-01-09 VITALS
HEIGHT: 67 IN | SYSTOLIC BLOOD PRESSURE: 124 MMHG | HEART RATE: 73 BPM | DIASTOLIC BLOOD PRESSURE: 68 MMHG | OXYGEN SATURATION: 97 % | WEIGHT: 164 LBS | BODY MASS INDEX: 25.74 KG/M2

## 2024-01-09 DIAGNOSIS — I42.8 NONISCHEMIC CARDIOMYOPATHY (H): Primary | ICD-10-CM

## 2024-01-09 DIAGNOSIS — I47.29 PAROXYSMAL VENTRICULAR TACHYCARDIA (H): ICD-10-CM

## 2024-01-09 LAB
ATRIAL RATE - MUSE: 59 BPM
DIASTOLIC BLOOD PRESSURE - MUSE: NORMAL MMHG
INTERPRETATION ECG - MUSE: NORMAL
P AXIS - MUSE: 39 DEGREES
PR INTERVAL - MUSE: 174 MS
QRS DURATION - MUSE: 96 MS
QT - MUSE: 436 MS
QTC - MUSE: 431 MS
R AXIS - MUSE: 57 DEGREES
SYSTOLIC BLOOD PRESSURE - MUSE: NORMAL MMHG
T AXIS - MUSE: 39 DEGREES
VENTRICULAR RATE- MUSE: 59 BPM

## 2024-01-09 PROCEDURE — 93000 ELECTROCARDIOGRAM COMPLETE: CPT | Performed by: INTERNAL MEDICINE

## 2024-01-09 PROCEDURE — 99215 OFFICE O/P EST HI 40 MIN: CPT

## 2024-01-09 RX ORDER — METOPROLOL SUCCINATE 25 MG/1
12.5 TABLET, EXTENDED RELEASE ORAL DAILY
Qty: 90 TABLET | Refills: 1 | Status: SHIPPED | OUTPATIENT
Start: 2024-01-09 | End: 2024-02-05

## 2024-01-09 ASSESSMENT — PAIN SCALES - GENERAL: PAINLEVEL: NO PAIN (0)

## 2024-01-09 NOTE — PATIENT INSTRUCTIONS
You were seen in the Electrophysiology Clinic today by: Cristine REILLY    Plan:   Medication Changes:   START- Metoprolol 12.5mg once daily       Follow up Visit:  Establish care with general Cardiology in a few weeks to a month  Follow up with EP only as needed      If you have further questions, please utilize [employer] to contact us.     Your Care Team:    EP Cardiology   Telephone Number       [facility]:       Nurse line: [phone]    Scheduling line: [phone]      [facility]:    Nurse line: [phone]    Scheduling line: [phone]     [facility]:  Ayde FRENCH RN   Scheduling/Nurse line: [phone]    Procedure [phone]  Gwen Hammonds     For the Device Clinic (Pacemakers, ICDs, Loop Recorders)    During business hours: [phone]    After business hours:   [phone]- select option 4 and ask for job code [id number].       On-call cardiologist for after hours or on weekends:   [phone], option #4  Ask to speak to the on-call cardiologist         As always, Thank you for trusting us with your health care needs!

## 2024-01-15 ENCOUNTER — MYC REFILL (OUTPATIENT)
Dept: FAMILY MEDICINE | Facility: OTHER | Age: 39
End: 2024-01-15

## 2024-01-15 DIAGNOSIS — E03.4 HYPOTHYROIDISM DUE TO ACQUIRED ATROPHY OF THYROID: ICD-10-CM

## 2024-01-15 NOTE — TELEPHONE ENCOUNTER
Synthroid      Last Written Prescription Date:  10/23/23  Last Fill Quantity: 30,   # refills: 1  Last Office Visit: 10/23/23  Future Office visit:

## 2024-01-15 NOTE — TELEPHONE ENCOUNTER
Levothyroxine  Last Written Prescription Date: 10/23/23  Last Fill Quantity: 30 # of Refills: 1  Last Office Visit: 10/23/23

## 2024-01-16 RX ORDER — LEVOTHYROXINE SODIUM 112 UG/1
112 TABLET ORAL DAILY
Qty: 90 TABLET | Refills: 2 | Status: SHIPPED | OUTPATIENT
Start: 2024-01-16 | End: 2024-04-25

## 2024-01-16 RX ORDER — LEVOTHYROXINE SODIUM 112 UG/1
112 TABLET ORAL DAILY
Qty: 30 TABLET | Refills: 0 | OUTPATIENT
Start: 2024-01-16

## 2024-02-02 NOTE — PROGRESS NOTES
[facility] HEART CARE   CARDIOLOGY PROGRESS NOTE     Chief Complaint   Patient presents with    Consult For     Dialated cardiomyopathy          Diagnosis:  1.  CHF-systolic.   -45-50% on 12/18/2023.   -49% on cardiac MRI on 4/14/2023, .   -55-60% on 12/8/2022.  2.  Frequent PVC.   -<0.1% on 10/25/2023.   -PVC ablation on 9/25/2023, [facility]-Dr. Stafford.    -PVC burden of 15.6% on 6/30/2023.  3.  History of syncope.  4.  Hypothyroidism.  5.  History of intentional drug overdose.      Assessment/Plan:    1.  CHF: Mildly reduced.  EF noted to be 51% on stress test from 1/5/2023.  Echocardiogram showed an EF of 45-50% on 12/18/2023.  Cardiac MRI on 4/14/2023 through [facility] showed an EF of 47.7%.  Patient not having significant peripheral edema or swelling.  Works out 5-6 times a week, 30 to 60 minutes at a time.  Currently on metoprolol 12.5 mg XL once a day.  Increase to 25 mg XL once daily.  Start on Entresto 24/26 mg twice a day and Farxiga 10 mg daily.  Consider spironolactone in the future.  Will also increase Entresto at her next follow-up.  Discussed salt restriction, fluid restriction, and daily weights.  Briefly, using the heart model in the room, discussed a weak heart.  She states her mother also had heart failure making me concerned it is familial.  2.  Frequent PVCs: Now with an ablation.  Palpitations have resolved.  Noted to have increasing palpitations in 2022.  At times, has almost had near syncopal per patient.  This has all resolved.  Most recent Zio patch from 10/25/2023 showed a PVC burden of less than 0.1%.  Had ablation of the [facility] on 9/25/2023 with Dr. Stafford.  3.  Hypothyroidism: Now on treatment.  4.  Labs in 2 weeks because of initiation of Jardiance and Entresto.  Labs include magnesium, BNP, BMP, and A1c.  5.  Follow-up with me in 1 to 2 months.  At that time, consider increasing Entresto if tolerating this medication.  Consider spironolactone.  He is having  "labs in 2 weeks.      Interval history:  See above.      HPI:    Manju Weiss presents for cardiology consult. She has no significant cardiac history. She has a non-cardiac history including hypothyroidism, pre-eclampsia, anxiety.        Ms. Weiss tells me that over the last year she has had progressively worsening symptoms of fluttering/skipping sensation in her chest as well as lightheaded, near-syncopal symptoms with exertion such as walking around [employer]. She tells me that she could be having a conversation with her  and she gets symptoms of blurred vision, darkening of vision, lightheaded. Symptoms typically improve in a few seconds but lately seems like it is taking longer. She has not experienced syncope. She does workout- she follows [employer] work-out program doing weight lifting and HIIT program. She tells me that when she is doing these workouts she does not experience symptoms of chest pain, dyspnea, lightheaded. Symptoms seem most prominent at rest- especially at night she is feeling racing/palpitations \"like there is a ticking time bomb in my chest.\"      She does endorse history of syncope as a teenager. She tells me that as a teenager she would avoid eating for extended periods of time. Did not like eating in front of people. Did not like people to know if or how much she was eating. Very thin. She relates her syncopal episodes to this. As an adult she does not feel she has restrictive eating. No purging after eating.      Has pulmonary function testing scheduled in January 2023. She does do daily Advair.       Relevant testing:  ECHO on 12/18/23:  No pericardial effusion is present.  Left ventricular function is decreased. The ejection fraction is 45-50%  (mildly reduced).  No regional wall motion abnormalities are seen.  Global right ventricular function is normal.  Mild left atrial enlargement is present.  Trace to mild mitral insufficiency is present.  No aortic stenosis is " present.  Trace to mild tricuspid insufficiency is present.  This study was compared with the study from 12/8/22 .  LV function has minimally worsened since previous study      Zio patch on 10/25/23:  Worn for 14 days and 0 hr's.  After removing artifact, total time was 13 days and 20 hr's. Placed on October 25, 2023 at 11:37 AM and completed on November 8, 2023 at 1:37 AM.  Underlying rhythm was sinus.  Hrt rate ranged from 41 bpm, maximum heart rate of 162 bmp, averaging 71 bmp.  No significant bradycardia, pauses, Mobitz type II or 3rd degree heart block.  No atrial fibrillation on this study.  x 30 triggered events and x 2 diary entries.  These corresponded to normal sinus rhythm and SVE's.  x 0 runs of VT.    x 0 runs of SVT.  Rare, <1% of PAC's, atrial couplets, atrial triplets, and PVC's.  No episodes of ventricular bigeminy.  No episodes of ventricular trigeminy.      PVC ablation on 9/25/23, [facility]:    Zio patch on 6/30/23:  PVC burden on 15.6%.    Cardiac MRI 9/14/2023:  1.   Findings consistent with non-ischemic dilated cardiomyopathy.   2.   Mildly dilated left ventricle with mildly reduced systolic function; LVEF = 47.7%.   3.   Mildly dilated right ventricle with mildly reduced RVEF = 49%.   4.   No myocardial infarction or fibrosis.       Stress test on 1/5/23:    The nuclear stress test is abnormal.     The left ventricular ejection fraction at rest is 51%.  The left   ventricular ejection fraction at stress is 58%.     There is no prior study for comparison.   -enlarged left ventricle with global mild hypokinesis.    No reversible ischemia     ECHO on 12/8/22:  Global and regional left ventricular function is normal with an EF of 55-60%.  Mild left ventricular dilation is present.  Right ventricular function, chamber size, wall motion, and thickness are  normal.  Mild to moderate left atrial enlargement is present.  The atrial septum is intact as assessed by color Doppler and agitated saline  bubble  study .  Trace to mild mitral insufficiency is present.  Trace tricuspid insufficiency is present.  Trace pulmonic insufficiency is present.      Zio patch on 11/8/22:  Worn for 13 days and 21 hr's.  After removing artifact, total time was 13 days and 19 hr's. Placed on 11/8/22 at 11:31 am and completed on 11/22/22 at 8:38 am.  Underlying rhythm was sinus  Hrt rate ranged from 44 bpm, maximum heart rate of 207 bmp, averaging 77 bmp.  No significant bradycardia, pauses, Mobitz type II or 3rd degree heart block.  No atrial fibrillation on this study.  x61 triggered events and x18 diary entries.  These corresponded to NSR, Ventricular Bigeminy and Trigeminy, SVEs and VEs.  x29 runs of VT longest lasting 11 beats with a maximum heart rate of 207 bmp.    x0 runs of SVT.  Rare, <1% of PAC's, atrial couplets, atrial triplets.  VEs were frequent at 10.9% ([id number]).  Ventricular bigeminy lasting up to 10 mins 33 sec's.  Ventricular trigeminy lasting up to 16 mins 16 sec's.              ICD-10-CM    1. Benign essential hypertension  I10 metoprolol succinate ER (TOPROL XL) 25 MG 24 hr tablet     sacubitril-valsartan (ENTRESTO) 24-26 MG per tablet      2. Nonischemic cardiomyopathy (H)  I42.8 metoprolol succinate ER (TOPROL XL) 25 MG 24 hr tablet     sacubitril-valsartan (ENTRESTO) 24-26 MG per tablet     dapagliflozin (FARXIGA) 10 MG TABS tablet     N terminal pro BNP outpatient      3. Paroxysmal ventricular tachycardia (H)  I47.29 metoprolol succinate ER (TOPROL XL) 25 MG 24 hr tablet      4. Frequent PVCs  I49.3 metoprolol succinate ER (TOPROL XL) 25 MG 24 hr tablet     Magnesium      5. Chronic systolic congestive heart failure (H)  I50.22 metoprolol succinate ER (TOPROL XL) 25 MG 24 hr tablet     sacubitril-valsartan (ENTRESTO) 24-26 MG per tablet     dapagliflozin (FARXIGA) 10 MG TABS tablet     Basic metabolic panel     N terminal pro BNP outpatient     Magnesium      6. Hyperglycemia  R73.9 Hemoglobin A1c          Past  Medical History:   Diagnosis Date    Abnormal Pap smear of cervix 11/11/2011    Formatting of this note might be different from the original. 5/2011 LSIL     (age 25)      [place] path:  CIN1 11/2011  NILM 2014 NILM 2017 NILM      31 y.o. 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 On low dose ASA prophylaxis regimen    Migraine 09/07/2023    Migraines     Nonischemic cardiomyopathy (H) 09/07/2023    Uncomplicated asthma        Past Surgical History:   Procedure Laterality Date    COLONOSCOPY  2013    EP ABLATION PVC N/A 9/25/2023    Procedure: EP Ablation PVC;  Surgeon: Saw Stafford MD;  Location:  HEART CARDIAC CATH LAB    TONSILLECTOMY  2001       Allergies   Allergen Reactions    Sulfa Antibiotics Shortness Of Breath    Latex Hives, Itching and Rash       Current Outpatient Medications   Medication Sig Dispense Refill    albuterol (PROAIR HFA/PROVENTIL HFA/VENTOLIN HFA) 108 (90 Base) MCG/ACT inhaler Inhale 2 puffs into the lungs every 4 hours as needed for shortness of breath / dyspnea or wheezing 18 g 1    APPLE CIDER VINEGAR PO       dapagliflozin (FARXIGA) 10 MG TABS tablet Take 1 tablet (10 mg) by mouth daily 90 tablet 3    fluticasone-salmeterol (WIXELA INHUB) 100-50 MCG/ACT inhaler Inhale 1 puff into the lungs every 12 hours 60 each 1    levonorgestrel (MIRENA) 52 MG (20 mcg/day) IUD 1 each by Intrauterine route once      levothyroxine (SYNTHROID/LEVOTHROID) 112 MCG tablet Take 1 tablet (112 mcg) by mouth daily 90 tablet 2    metoprolol succinate ER (TOPROL XL) 25 MG 24 hr tablet Take 1 tablet (25 mg) by mouth daily 90 tablet 3    multivitamin w/minerals (THERA-VIT-M) tablet Take  1 tablet by mouth daily      sacubitril-valsartan (ENTRESTO) 24-26 MG per tablet Take 1 tablet by mouth 2 times daily 180 tablet 3       Social History     Socioeconomic History    Marital status:      Spouse name: Not on file    Number of children: Not on file    Years of education: Not on file    Highest education level: Not on file   Occupational History    Not on file   Tobacco Use    Smoking status: Every Day     Packs/day: 0.50     Years: 17.00     Additional pack years: 0.00     Total pack years: 8.50     Types: Cigarettes    Smokeless tobacco: Never   Vaping Use    Vaping Use: Never used   Substance and Sexual Activity    Alcohol use: Yes    Drug use: Never    Sexual activity: Yes     Partners: Male     Birth control/protection: I.U.D.   Other Topics Concern    Not on file   Social History Narrative    Not on file     Social Determinants of Health     Financial Resource Strain: Low Risk  (10/23/2023)    Financial Resource Strain     Within the past 12 months, have you or your family members you live with been unable to get utilities (heat, electricity) when it was really needed?: No   Food Insecurity: Low Risk  (10/23/2023)    Food Insecurity     Within the past 12 months, did you worry that your food would run out before you got money to buy more?: No     Within the past 12 months, did the food you bought just not last and you didn t have money to get more?: No   Transportation Needs: Low Risk  (10/23/2023)    Transportation Needs     Within the past 12 months, has lack of transportation kept you from medical appointments, getting your medicines, non-medical meetings or appointments, work, or from getting things that you need?: No   Physical Activity: Not on file   Stress: Not on file   Social Connections: Not on file   Interpersonal Safety: Low Risk  (10/23/2023)    Interpersonal Safety     Do you feel physically and emotionally safe where you currently live?: Yes     Within the past 12 months, have  "you been hit, slapped, kicked or otherwise physically hurt by someone?: No     Within the past 12 months, have you been humiliated or emotionally abused in other ways by your partner or ex-partner?: No   Housing Stability: Low Risk  (10/23/2023)    Housing Stability     Do you have housing? : Yes     Are you worried about losing your housing?: No       LAB RESULTS:   Office Visit on 01/09/2024   Component Date Value Ref Range Status    Ventricular Rate 01/09/2024 59  BPM Final    Atrial Rate 01/09/2024 59  BPM Final    NJ Interval 01/09/2024 174  ms Final    QRS Duration 01/09/2024 96  ms Final    QT 01/09/2024 436  ms Final    QTc 01/09/2024 431  ms Final    P Axis 01/09/2024 39  degrees Final    R AXIS 01/09/2024 57  degrees Final    T Axis 01/09/2024 39  degrees Final    Interpretation ECG 01/09/2024    Final                    Value:Sinus bradycardia  Minimal voltage criteria for LVH, may be normal variant ( Sokolow-Quijano )  When compared with ECG of 25-SEP-2023 19:02,  Nonspecific T wave abnormality no longer evident in Lateral leads  Confirmed by MD SHANKAR DANIEL ([id number]) on 1/9/2024 4:30:36 PM          Review of systems: Negative except that which was noted in the HPI.    Physical examination:  /78 (BP Location: Right arm, Patient Position: Sitting, Cuff Size: Adult Regular)   Pulse 76   Temp 97.6  F (36.4  C) (Tympanic)   Ht 1.676 m (5' 6\")   Wt 76 kg (167 lb 9.6 oz)   SpO2 97%   BMI 27.05 kg/m      GENERAL APPEARANCE: healthy, alert and no distress  CHEST: lungs clear to auscultation.  CARDIOVASCULAR: regular rhythm, normal S1 with physiologic split S2, no S3 or S4 and no murmur, click or rub  EXTREMITIES: no clubbing, cyanosis or edema.    Total time spent on day of visit, including review of tests, obtaining/reviewing separately obtained history, ordering medications/tests/procedures, communicating with PCP/consultants, and documenting in electronic medical record: 60 minutes.  "             Thank you for allowing me to participate in the care of your patient. Please do not hesitate to contact me if you have any questions.     Scar Salinas, DO

## 2024-02-04 ENCOUNTER — HEALTH MAINTENANCE LETTER (OUTPATIENT)
Age: 39
End: 2024-02-04

## 2024-02-05 ENCOUNTER — OFFICE VISIT (OUTPATIENT)
Dept: CARDIOLOGY | Facility: OTHER | Age: 39
End: 2024-02-05
Attending: INTERNAL MEDICINE
Payer: COMMERCIAL

## 2024-02-05 VITALS
HEART RATE: 76 BPM | WEIGHT: 167.6 LBS | TEMPERATURE: 97.6 F | SYSTOLIC BLOOD PRESSURE: 132 MMHG | DIASTOLIC BLOOD PRESSURE: 78 MMHG | BODY MASS INDEX: 26.93 KG/M2 | HEIGHT: 66 IN | OXYGEN SATURATION: 97 %

## 2024-02-05 DIAGNOSIS — I42.8 NONISCHEMIC CARDIOMYOPATHY (H): ICD-10-CM

## 2024-02-05 DIAGNOSIS — I49.3 FREQUENT PVCS: ICD-10-CM

## 2024-02-05 DIAGNOSIS — I50.22 CHRONIC SYSTOLIC CONGESTIVE HEART FAILURE (H): ICD-10-CM

## 2024-02-05 DIAGNOSIS — I47.29 PAROXYSMAL VENTRICULAR TACHYCARDIA (H): ICD-10-CM

## 2024-02-05 DIAGNOSIS — R73.9 HYPERGLYCEMIA: ICD-10-CM

## 2024-02-05 DIAGNOSIS — I10 BENIGN ESSENTIAL HYPERTENSION: Primary | ICD-10-CM

## 2024-02-05 PROCEDURE — 99215 OFFICE O/P EST HI 40 MIN: CPT | Performed by: INTERNAL MEDICINE

## 2024-02-05 RX ORDER — METOPROLOL SUCCINATE 25 MG/1
25 TABLET, EXTENDED RELEASE ORAL DAILY
Qty: 90 TABLET | Refills: 3 | Status: SHIPPED | OUTPATIENT
Start: 2024-02-05 | End: 2024-03-27

## 2024-02-05 RX ORDER — DAPAGLIFLOZIN 10 MG/1
10 TABLET, FILM COATED ORAL DAILY
Qty: 90 TABLET | Refills: 3 | Status: SHIPPED | OUTPATIENT
Start: 2024-02-05

## 2024-02-05 RX ORDER — SACUBITRIL AND VALSARTAN 24; 26 MG/1; MG/1
1 TABLET, FILM COATED ORAL 2 TIMES DAILY
Qty: 180 TABLET | Refills: 3 | Status: SHIPPED | OUTPATIENT
Start: 2024-02-05 | End: 2024-03-27 | Stop reason: ALTCHOICE

## 2024-02-05 ASSESSMENT — PAIN SCALES - GENERAL: PAINLEVEL: NO PAIN (0)

## 2024-02-05 NOTE — PATIENT INSTRUCTIONS
Thank you for allowing Dr VIPUL Salinas and our  team to participate in your care. Please call our office at [phone] with scheduling questions or if you need to cancel or change your appointment. With any other questions or concerns you may call cardiology nurse at  [phone].       If you experience chest pain, chest pressure, chest tightness, shortness of breath, fainting, lightheadedness, nausea, vomiting, or other concerning symptoms, please report to the Emergency Department or call 911. These symptoms may be emergent, and best treated in the Emergency Department.

## 2024-03-12 ENCOUNTER — LAB (OUTPATIENT)
Dept: LAB | Facility: OTHER | Age: 39
End: 2024-03-12
Payer: COMMERCIAL

## 2024-03-12 DIAGNOSIS — R73.9 HYPERGLYCEMIA: ICD-10-CM

## 2024-03-12 DIAGNOSIS — I49.3 FREQUENT PVCS: ICD-10-CM

## 2024-03-12 DIAGNOSIS — I42.8 NONISCHEMIC CARDIOMYOPATHY (H): ICD-10-CM

## 2024-03-12 DIAGNOSIS — I50.22 CHRONIC SYSTOLIC CONGESTIVE HEART FAILURE (H): ICD-10-CM

## 2024-03-12 LAB
ANION GAP SERPL CALCULATED.3IONS-SCNC: 10 MMOL/L (ref 7–15)
BUN SERPL-MCNC: 11.5 MG/DL (ref 6–20)
CALCIUM SERPL-MCNC: 9.4 MG/DL (ref 8.6–10)
CHLORIDE SERPL-SCNC: 101 MMOL/L (ref 98–107)
CREAT SERPL-MCNC: 0.74 MG/DL (ref 0.51–0.95)
DEPRECATED HCO3 PLAS-SCNC: 26 MMOL/L (ref 22–29)
EGFRCR SERPLBLD CKD-EPI 2021: >90 ML/MIN/1.73M2
EST. AVERAGE GLUCOSE BLD GHB EST-MCNC: 105 MG/DL
GLUCOSE SERPL-MCNC: 83 MG/DL (ref 70–99)
HBA1C MFR BLD: 5.3 %
MAGNESIUM SERPL-MCNC: 2.1 MG/DL (ref 1.7–2.3)
NT-PROBNP SERPL-MCNC: 43 PG/ML (ref 0–450)
POTASSIUM SERPL-SCNC: 4 MMOL/L (ref 3.4–5.3)
SODIUM SERPL-SCNC: 137 MMOL/L (ref 135–145)

## 2024-03-12 PROCEDURE — 83735 ASSAY OF MAGNESIUM: CPT

## 2024-03-12 PROCEDURE — 83880 ASSAY OF NATRIURETIC PEPTIDE: CPT

## 2024-03-12 PROCEDURE — 36415 COLL VENOUS BLD VENIPUNCTURE: CPT

## 2024-03-12 PROCEDURE — 80048 BASIC METABOLIC PNL TOTAL CA: CPT

## 2024-03-12 PROCEDURE — 83036 HEMOGLOBIN GLYCOSYLATED A1C: CPT

## 2024-03-26 NOTE — PROGRESS NOTES
[facility] HEART CARE   CARDIOLOGY PROGRESS NOTE     Chief Complaint   Patient presents with    Follow Up     1-3 month follow up          Diagnosis:  1.  CHF-systolic.   -45-50% on 12/18/2023.   -49% on cardiac MRI on 4/14/2023, [facility].   -55-60% on 12/8/2022.  2.  Frequent PVC.   -<0.1% on 10/25/2023.   -PVC ablation on 9/25/2023, [facility]-Dr. Stafford.    -PVC burden of 15.6% on 6/30/2023.  3.  History of syncope.  4.  Hypothyroidism.  5.  History of intentional drug overdose.  6.  Tobacco abuse.   -1/2 day for 17 years.      Assessment/Plan:    1.  CHF: Mildly reduced.  EF noted to be 51% on stress test from 1/5/2023.  Echocardiogram showed an EF of 45-50% on 12/18/2023.  Cardiac MRI on 4/14/2023 through [facility] showed an EF of 47.7%.  Patient not having significant peripheral edema or swelling.  Works out 5-6 times a week, 30 to 60 minutes at a time.  Currently on metoprolol 25 mg XL once a day, Farxiga 10 mg once daily, and Entresto 24/26 mg twice a day.  Will metoprolol increase to 50 mg XL once daily.  Will increase Entresto to 49/51 mg twice a day and continue on Farxiga 10 mg daily.  Consider spironolactone in the future.  Will see her in follow-up to discuss possible increases of metoprolol and Entresto.  Briefly, discussed salt restriction, fluid restriction, and daily weights again today.  Patient is doing well with shortness of breath with aggressive activities.  Mother has a history of heart failure making me concerned it is genetic.    2.  Frequent PVCs: Now with an ablation.  Palpitations have improved substantially.  Noted to have increasing palpitations in 2022.  At times, has almost had near syncopal per patient. Most recent Zio patch from 10/25/2023 showed a PVC burden of less than 0.1%.  Had ablation of the [facility] on 9/25/2023 with Dr. Stafford.  3.  Hypothyroidism: Now on treatment.  Will plan for TSH in 2 weeks.  4.  Labs in 2 weeks because of an increase Entresto.  Labs  "include TSH, BMP, magnesium, BNP.  5.  Chest discomfort: Describes pressure to her chest lasting 1 to 2 minutes.  Patient has had ongoing concerns.  Normal exercise stress test on 1/5/2023.  Plan for CTA of coronaries.  6.  Follow-up after completion of CTA coronaries, labs in 2 weeks, and increase of metoprolol and Entresto.    Interval history:  See above.      HPI:    Manju Weiss presents for cardiology consult. She has no significant cardiac history. She has a non-cardiac history including hypothyroidism, pre-eclampsia, anxiety.        Ms. Weiss tells me that over the last year she has had progressively worsening symptoms of fluttering/skipping sensation in her chest as well as lightheaded, near-syncopal symptoms with exertion such as walking around [employer]. She tells me that she could be having a conversation with her  and she gets symptoms of blurred vision, darkening of vision, lightheaded. Symptoms typically improve in a few seconds but lately seems like it is taking longer. She has not experienced syncope. She does workout- she follows [employer] work-out program doing weight lifting and HIIT program. She tells me that when she is doing these workouts she does not experience symptoms of chest pain, dyspnea, lightheaded. Symptoms seem most prominent at rest- especially at night she is feeling racing/palpitations \"like there is a ticking time bomb in my chest.\"      She does endorse history of syncope as a teenager. She tells me that as a teenager she would avoid eating for extended periods of time. Did not like eating in front of people. Did not like people to know if or how much she was eating. Very thin. She relates her syncopal episodes to this. As an adult she does not feel she has restrictive eating. No purging after eating.      Has pulmonary function testing scheduled in January 2023. She does do daily Advair.       Relevant testing:  ECHO on 12/18/23:  No pericardial effusion is " present.  Left ventricular function is decreased. The ejection fraction is 45-50% (mildly reduced).  No regional wall motion abnormalities are seen.  Global right ventricular function is normal.  Mild left atrial enlargement is present.  Trace to mild mitral insufficiency is present.  No aortic stenosis is present.  Trace to mild tricuspid insufficiency is present.  This study was compared with the study from 12/8/22 .  LV function has minimally worsened since previous study      Zio patch on 10/25/23:  Worn for 14 days and 0 hr's.  After removing artifact, total time was 13 days and 20 hr's. Placed on October 25, 2023 at 11:37 AM and completed on November 8, 2023 at 1:37 AM.  Underlying rhythm was sinus.  Hrt rate ranged from 41 bpm, maximum heart rate of 162 bmp, averaging 71 bmp.  No significant bradycardia, pauses, Mobitz type II or 3rd degree heart block.  No atrial fibrillation on this study.  x 30 triggered events and x 2 diary entries.  These corresponded to normal sinus rhythm and SVE's.  x 0 runs of VT.    x 0 runs of SVT.  Rare, <1% of PAC's, atrial couplets, atrial triplets, and PVC's.  No episodes of ventricular bigeminy.  No episodes of ventricular trigeminy.      PVC ablation on 9/25/23, [facility]:    Zio patch on 6/30/23:  PVC burden on 15.6%.    Cardiac MRI 9/14/2023:  1.   Findings consistent with non-ischemic dilated cardiomyopathy.   2.   Mildly dilated left ventricle with mildly reduced systolic function; LVEF = 47.7%.   3.   Mildly dilated right ventricle with mildly reduced RVEF = 49%.   4.   No myocardial infarction or fibrosis.       Stress test on 1/5/23:    The nuclear stress test is abnormal.     The left ventricular ejection fraction at rest is 51%.  The left   ventricular ejection fraction at stress is 58%.     There is no prior study for comparison.   -enlarged left ventricle with global mild hypokinesis.    No reversible ischemia     ECHO on 12/8/22:  Global and regional left ventricular  function is normal with an EF of 55-60%.  Mild left ventricular dilation is present.  Right ventricular function, chamber size, wall motion, and thickness are  normal.  Mild to moderate left atrial enlargement is present.  The atrial septum is intact as assessed by color Doppler and agitated saline  bubble study .  Trace to mild mitral insufficiency is present.  Trace tricuspid insufficiency is present.  Trace pulmonic insufficiency is present.      Zio patch on 11/8/22:  Worn for 13 days and 21 hr's.  After removing artifact, total time was 13 days and 19 hr's. Placed on 11/8/22 at 11:31 am and completed on 11/22/22 at 8:38 am.  Underlying rhythm was sinus  Hrt rate ranged from 44 bpm, maximum heart rate of 207 bmp, averaging 77 bmp.  No significant bradycardia, pauses, Mobitz type II or 3rd degree heart block.  No atrial fibrillation on this study.  x61 triggered events and x18 diary entries.  These corresponded to NSR, Ventricular Bigeminy and Trigeminy, SVEs and VEs.  x29 runs of VT longest lasting 11 beats with a maximum heart rate of 207 bmp.    x0 runs of SVT.  Rare, <1% of PAC's, atrial couplets, atrial triplets.  VEs were frequent at 10.9% ([id number]).  Ventricular bigeminy lasting up to 10 mins 33 sec's.  Ventricular trigeminy lasting up to 16 mins 16 sec's.              ICD-10-CM    1. Nonischemic cardiomyopathy (H)  I42.8 metoprolol succinate ER (TOPROL XL) 50 MG 24 hr tablet     sacubitril-valsartan (ENTRESTO) 49-51 MG per tablet     N terminal pro BNP outpatient     Magnesium     Basic metabolic panel     TSH with free T4 reflex      2. Paroxysmal ventricular tachycardia (H)  I47.29 metoprolol succinate ER (TOPROL XL) 50 MG 24 hr tablet      3. Benign essential hypertension  I10 metoprolol succinate ER (TOPROL XL) 50 MG 24 hr tablet     sacubitril-valsartan (ENTRESTO) 49-51 MG per tablet      4. Frequent PVCs  I49.3 metoprolol succinate ER (TOPROL XL) 50 MG 24 hr tablet     Magnesium      5. Chronic  systolic congestive heart failure (H)  I50.22 metoprolol succinate ER (TOPROL XL) 50 MG 24 hr tablet     sacubitril-valsartan (ENTRESTO) 49-51 MG per tablet      6. Chest pain, unspecified type  R07.9 CTA Angiogram coronary artery            Past Medical History:   Diagnosis Date    Abnormal Pap smear of cervix 11/11/2011    Formatting of this note might be different from the original. 5/2011 LSIL     (age 25)      [facility]:  CIN1 11/2011  NILM 2014 NILM 2017 NILM      31 y.o. 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 On low dose ASA prophylaxis regimen    Migraine 09/07/2023    Migraines     Nonischemic cardiomyopathy (H) 09/07/2023    Uncomplicated asthma        Past Surgical History:   Procedure Laterality Date    COLONOSCOPY  2013    EP ABLATION PVC N/A 9/25/2023    Procedure: EP Ablation PVC;  Surgeon: Saw Stafford MD;  Location:  HEART CARDIAC CATH LAB    TONSILLECTOMY  2001       Allergies   Allergen Reactions    Sulfa Antibiotics Shortness Of Breath    Latex Hives, Itching and Rash       Current Outpatient Medications   Medication Sig Dispense Refill    albuterol (PROAIR HFA/PROVENTIL HFA/VENTOLIN HFA) 108 (90 Base) MCG/ACT inhaler Inhale 2 puffs into the lungs every 4 hours as needed for shortness of breath / dyspnea or wheezing 18 g 1    APPLE CIDER VINEGAR PO       dapagliflozin (FARXIGA) 10 MG TABS tablet Take 1 tablet (10 mg) by mouth daily 90 tablet 3    fluticasone-salmeterol (WIXELA INHUB) 100-50 MCG/ACT inhaler Inhale 1 puff into the lungs every 12 hours 60 each 1    levonorgestrel (MIRENA) 52 MG (20 mcg/day) IUD 1 each by Intrauterine route once      levothyroxine  (SYNTHROID/LEVOTHROID) 112 MCG tablet Take 1 tablet (112 mcg) by mouth daily 90 tablet 2    metoprolol succinate ER (TOPROL XL) 50 MG 24 hr tablet Take 1 tablet (50 mg) by mouth daily 90 tablet 1    multivitamin w/minerals (THERA-VIT-M) tablet Take 1 tablet by mouth daily      sacubitril-valsartan (ENTRESTO) 49-51 MG per tablet Take 1 tablet by mouth 2 times daily 180 tablet 1       Social History     Socioeconomic History    Marital status:      Spouse name: Not on file    Number of children: Not on file    Years of education: Not on file    Highest education level: Not on file   Occupational History    Not on file   Tobacco Use    Smoking status: Every Day     Packs/day: 0.50     Years: 17.00     Additional pack years: 0.00     Total pack years: 8.50     Types: Cigarettes    Smokeless tobacco: Never   Vaping Use    Vaping Use: Never used   Substance and Sexual Activity    Alcohol use: Yes    Drug use: Never    Sexual activity: Yes     Partners: Male     Birth control/protection: I.U.D.   Other Topics Concern    Not on file   Social History Narrative    Not on file     Social Determinants of Health     Financial Resource Strain: Low Risk  (10/23/2023)    Financial Resource Strain     Within the past 12 months, have you or your family members you live with been unable to get utilities (heat, electricity) when it was really needed?: No   Food Insecurity: Low Risk  (10/23/2023)    Food Insecurity     Within the past 12 months, did you worry that your food would run out before you got money to buy more?: No     Within the past 12 months, did the food you bought just not last and you didn t have money to get more?: No   Transportation Needs: Low Risk  (10/23/2023)    Transportation Needs     Within the past 12 months, has lack of transportation kept you from medical appointments, getting your medicines, non-medical meetings or appointments, work, or from getting things that you need?: No   Physical Activity: Not  "on file   Stress: Not on file   Social Connections: Not on file   Interpersonal Safety: Low Risk  (10/23/2023)    Interpersonal Safety     Do you feel physically and emotionally safe where you currently live?: Yes     Within the past 12 months, have you been hit, slapped, kicked or otherwise physically hurt by someone?: No     Within the past 12 months, have you been humiliated or emotionally abused in other ways by your partner or ex-partner?: No   Housing Stability: Low Risk  (10/23/2023)    Housing Stability     Do you have housing? : Yes     Are you worried about losing your housing?: No       LAB RESULTS:   Office Visit on 01/09/2024   Component Date Value Ref Range Status    Ventricular Rate 01/09/2024 59  BPM Final    Atrial Rate 01/09/2024 59  BPM Final    W[place] Interval 01/09/2024 174  ms Final    QRS Duration 01/09/2024 96  ms Final    QT 01/09/2024 436  ms Final    QTc 01/09/2024 431  ms Final    P Axis 01/09/2024 39  degrees Final    R AXIS 01/09/2024 57  degrees Final    T Axis 01/09/2024 39  degrees Final    Interpretation ECG 01/09/2024    Final                    Value:Sinus bradycardia  Minimal voltage criteria for LVH, may be normal variant ( Sokolow-Quijano )  When compared with ECG of 25-SEP-2023 19:02,  Nonspecific T wave abnormality no longer evident in Lateral leads  Confirmed by MD SHANKAR DANIEL ([id number]) on 1/9/2024 4:30:36 PM          Review of systems: Negative except that which was noted in the HPI.    Physical examination:  /78   Pulse 66   Resp 18   Ht 1.676 m (5' 6\")   Wt 75.1 kg (165 lb 9.6 oz)   SpO2 97%   BMI 26.73 kg/m      GENERAL APPEARANCE: healthy, alert and no distress  CHEST: lungs clear to auscultation.  CARDIOVASCULAR: regular rhythm, normal S1 with physiologic split S2, no S3 or S4 and no murmur, click or rub  EXTREMITIES: no clubbing, cyanosis or edema.    Total time spent on day of visit, including review of tests, obtaining/reviewing separately obtained history, " ordering medications/tests/procedures, communicating with PCP/consultants, and documenting in electronic medical record: 30 minutes.              Thank you for allowing me to participate in the care of your patient. Please do not hesitate to contact me if you have any questions.     Scar Salinas, DO

## 2024-03-27 ENCOUNTER — OFFICE VISIT (OUTPATIENT)
Dept: CARDIOLOGY | Facility: OTHER | Age: 39
End: 2024-03-27
Attending: INTERNAL MEDICINE
Payer: COMMERCIAL

## 2024-03-27 VITALS
SYSTOLIC BLOOD PRESSURE: 132 MMHG | RESPIRATION RATE: 18 BRPM | HEART RATE: 66 BPM | DIASTOLIC BLOOD PRESSURE: 78 MMHG | HEIGHT: 66 IN | BODY MASS INDEX: 26.61 KG/M2 | OXYGEN SATURATION: 97 % | WEIGHT: 165.6 LBS

## 2024-03-27 DIAGNOSIS — I50.22 CHRONIC SYSTOLIC CONGESTIVE HEART FAILURE (H): ICD-10-CM

## 2024-03-27 DIAGNOSIS — I47.29 PAROXYSMAL VENTRICULAR TACHYCARDIA (H): ICD-10-CM

## 2024-03-27 DIAGNOSIS — R07.9 CHEST PAIN, UNSPECIFIED TYPE: ICD-10-CM

## 2024-03-27 DIAGNOSIS — I10 BENIGN ESSENTIAL HYPERTENSION: ICD-10-CM

## 2024-03-27 DIAGNOSIS — I42.8 NONISCHEMIC CARDIOMYOPATHY (H): Primary | ICD-10-CM

## 2024-03-27 DIAGNOSIS — I49.3 FREQUENT PVCS: ICD-10-CM

## 2024-03-27 PROBLEM — I47.20 PAROXYSMAL VENTRICULAR TACHYCARDIA (H): Status: ACTIVE | Noted: 2024-03-27

## 2024-03-27 PROCEDURE — 99214 OFFICE O/P EST MOD 30 MIN: CPT | Performed by: INTERNAL MEDICINE

## 2024-03-27 RX ORDER — SACUBITRIL AND VALSARTAN 49; 51 MG/1; MG/1
1 TABLET, FILM COATED ORAL 2 TIMES DAILY
Qty: 180 TABLET | Refills: 1 | Status: SHIPPED | OUTPATIENT
Start: 2024-03-27 | End: 2024-07-03

## 2024-03-27 RX ORDER — METOPROLOL SUCCINATE 50 MG/1
50 TABLET, EXTENDED RELEASE ORAL DAILY
Qty: 90 TABLET | Refills: 1 | Status: SHIPPED | OUTPATIENT
Start: 2024-03-27 | End: 2024-09-19

## 2024-03-27 ASSESSMENT — PAIN SCALES - GENERAL: PAINLEVEL: NO PAIN (0)

## 2024-03-27 NOTE — PATIENT INSTRUCTIONS
Thank you for allowing Dr VIPUL Salinas and our  team to participate in your care. Please call our office at [phone] with scheduling questions or if you need to cancel or change your appointment. With any other questions or concerns you may call cardiology nurse at  [phone].       If you experience chest pain, chest pressure, chest tightness, shortness of breath, fainting, lightheadedness, nausea, vomiting, or other concerning symptoms, please report to the Emergency Department or call 911. These symptoms may be emergent, and best treated in the Emergency Department.

## 2024-04-02 ENCOUNTER — TELEPHONE (OUTPATIENT)
Dept: INTERVENTIONAL RADIOLOGY/VASCULAR | Facility: HOSPITAL | Age: 39
End: 2024-04-02

## 2024-04-02 RX ORDER — METHYLPREDNISOLONE SODIUM SUCCINATE 125 MG/2ML
125 INJECTION, POWDER, LYOPHILIZED, FOR SOLUTION INTRAMUSCULAR; INTRAVENOUS
Status: CANCELLED | OUTPATIENT
Start: 2024-04-02

## 2024-04-02 RX ORDER — ACYCLOVIR 200 MG/1
0-1 CAPSULE ORAL
Status: CANCELLED | OUTPATIENT
Start: 2024-04-02

## 2024-04-02 RX ORDER — ONDANSETRON 2 MG/ML
4 INJECTION INTRAMUSCULAR; INTRAVENOUS
Status: CANCELLED | OUTPATIENT
Start: 2024-04-02

## 2024-04-02 RX ORDER — NITROGLYCERIN 0.4 MG/1
0.4 TABLET SUBLINGUAL
Status: CANCELLED | OUTPATIENT
Start: 2024-04-02

## 2024-04-02 RX ORDER — DIPHENHYDRAMINE HYDROCHLORIDE 50 MG/ML
25-50 INJECTION INTRAMUSCULAR; INTRAVENOUS
Status: CANCELLED | OUTPATIENT
Start: 2024-04-02

## 2024-04-02 RX ORDER — DIPHENHYDRAMINE HCL 25 MG
25 CAPSULE ORAL
Status: CANCELLED | OUTPATIENT
Start: 2024-04-02

## 2024-04-02 RX ORDER — METOPROLOL TARTRATE 1 MG/ML
5-15 INJECTION, SOLUTION INTRAVENOUS
Status: CANCELLED | OUTPATIENT
Start: 2024-04-02

## 2024-04-02 RX ORDER — METOPROLOL TARTRATE 25 MG/1
25-100 TABLET, FILM COATED ORAL
Status: CANCELLED | OUTPATIENT
Start: 2024-04-02

## 2024-04-03 ENCOUNTER — HOSPITAL ENCOUNTER (OUTPATIENT)
Facility: HOSPITAL | Age: 39
Discharge: HOME | End: 2024-04-03
Attending: RADIOLOGY | Admitting: RADIOLOGY
Payer: COMMERCIAL

## 2024-04-03 ENCOUNTER — HOSPITAL ENCOUNTER (OUTPATIENT)
Dept: CT IMAGING | Facility: HOSPITAL | Age: 39
Discharge: HOME | End: 2024-04-03
Attending: INTERNAL MEDICINE
Payer: COMMERCIAL

## 2024-04-03 VITALS
RESPIRATION RATE: 16 BRPM | HEART RATE: 62 BPM | OXYGEN SATURATION: 98 % | DIASTOLIC BLOOD PRESSURE: 73 MMHG | SYSTOLIC BLOOD PRESSURE: 113 MMHG

## 2024-04-03 DIAGNOSIS — R07.9 CHEST PAIN, UNSPECIFIED TYPE: ICD-10-CM

## 2024-04-03 PROCEDURE — 75574 CT ANGIO HRT W/3D IMAGE: CPT

## 2024-04-03 PROCEDURE — 250N000013 HC RX MED GY IP 250 OP 250 PS 637: Performed by: RADIOLOGY

## 2024-04-03 PROCEDURE — 250N000011 HC RX IP 250 OP 636: Performed by: RADIOLOGY

## 2024-04-03 RX ORDER — ONDANSETRON 2 MG/ML
4 INJECTION INTRAMUSCULAR; INTRAVENOUS
Status: DISCONTINUED | OUTPATIENT
Start: 2024-04-03 | End: 2024-04-04 | Stop reason: HOSPADM

## 2024-04-03 RX ORDER — METOPROLOL TARTRATE 25 MG/1
25-100 TABLET, FILM COATED ORAL
Status: DISCONTINUED | OUTPATIENT
Start: 2024-04-03 | End: 2024-04-04 | Stop reason: HOSPADM

## 2024-04-03 RX ORDER — IOPAMIDOL 755 MG/ML
98 INJECTION, SOLUTION INTRAVASCULAR ONCE
Status: COMPLETED | OUTPATIENT
Start: 2024-04-03 | End: 2024-04-03

## 2024-04-03 RX ORDER — DIPHENHYDRAMINE HYDROCHLORIDE 50 MG/ML
25-50 INJECTION INTRAMUSCULAR; INTRAVENOUS
Status: DISCONTINUED | OUTPATIENT
Start: 2024-04-03 | End: 2024-04-04 | Stop reason: HOSPADM

## 2024-04-03 RX ORDER — ACYCLOVIR 200 MG/1
0-1 CAPSULE ORAL
Status: DISCONTINUED | OUTPATIENT
Start: 2024-04-03 | End: 2024-04-04 | Stop reason: HOSPADM

## 2024-04-03 RX ORDER — METOPROLOL TARTRATE 1 MG/ML
5-15 INJECTION, SOLUTION INTRAVENOUS
Status: DISCONTINUED | OUTPATIENT
Start: 2024-04-03 | End: 2024-04-04 | Stop reason: HOSPADM

## 2024-04-03 RX ORDER — DIPHENHYDRAMINE HCL 25 MG
25 CAPSULE ORAL
Status: DISCONTINUED | OUTPATIENT
Start: 2024-04-03 | End: 2024-04-04 | Stop reason: HOSPADM

## 2024-04-03 RX ORDER — NITROGLYCERIN 0.4 MG/1
0.4 TABLET SUBLINGUAL
Status: DISCONTINUED | OUTPATIENT
Start: 2024-04-03 | End: 2024-04-04 | Stop reason: HOSPADM

## 2024-04-03 RX ORDER — METHYLPREDNISOLONE SODIUM SUCCINATE 125 MG/2ML
125 INJECTION, POWDER, LYOPHILIZED, FOR SOLUTION INTRAMUSCULAR; INTRAVENOUS
Status: DISCONTINUED | OUTPATIENT
Start: 2024-04-03 | End: 2024-04-04 | Stop reason: HOSPADM

## 2024-04-03 RX ADMIN — NITROGLYCERIN 0.4 MG: 0.4 TABLET SUBLINGUAL at 09:22

## 2024-04-03 RX ADMIN — IOPAMIDOL 98 ML: 755 INJECTION, SOLUTION INTRAVENOUS at 09:31

## 2024-04-03 ASSESSMENT — ACTIVITIES OF DAILY LIVING (ADL)
ADLS_ACUITY_SCORE: 35

## 2024-04-03 NOTE — PROGRESS NOTES
"Tolerated procedure well.    Pt to CT supine on table.  # 18 Angio started in right AC. States chest pain 0/10.  VSS. Heart rhythm NSR 60's to sinus funmi 50's. While laying on CT table when pt raised her hands above her head for the scan pt's heart rate dropped into the 40's. Pt stated \"my heart rate always drops when I raise my arms above my head since the ablation.\" Denies any dizziness. Given NTG 0.4 mg SL for procedure. Pt c/o some dizziness when sitting up after CT. BP stable. Tolerated procedure well.       Discharged to self.  Pt instructions given.     Procedure: CTA    Position:  supine    Pain:  0     Condition: Stable    Comments: See dictated procedure note for full details     Gaby Canchola RN       "

## 2024-04-10 ENCOUNTER — LAB (OUTPATIENT)
Dept: LAB | Facility: OTHER | Age: 39
End: 2024-04-10
Payer: COMMERCIAL

## 2024-04-10 DIAGNOSIS — I42.8 NONISCHEMIC CARDIOMYOPATHY (H): ICD-10-CM

## 2024-04-10 DIAGNOSIS — I49.3 FREQUENT PVCS: ICD-10-CM

## 2024-04-10 LAB
ANION GAP SERPL CALCULATED.3IONS-SCNC: 13 MMOL/L (ref 7–15)
BUN SERPL-MCNC: 12.7 MG/DL (ref 6–20)
CALCIUM SERPL-MCNC: 9.6 MG/DL (ref 8.6–10)
CHLORIDE SERPL-SCNC: 103 MMOL/L (ref 98–107)
CREAT SERPL-MCNC: 0.69 MG/DL (ref 0.51–0.95)
DEPRECATED HCO3 PLAS-SCNC: 23 MMOL/L (ref 22–29)
EGFRCR SERPLBLD CKD-EPI 2021: >90 ML/MIN/1.73M2
GLUCOSE SERPL-MCNC: 120 MG/DL (ref 70–99)
HOLD SPECIMEN: NORMAL
MAGNESIUM SERPL-MCNC: 2.1 MG/DL (ref 1.7–2.3)
NT-PROBNP SERPL-MCNC: 53 PG/ML (ref 0–450)
POTASSIUM SERPL-SCNC: 4.1 MMOL/L (ref 3.4–5.3)
SODIUM SERPL-SCNC: 139 MMOL/L (ref 135–145)
TSH SERPL DL<=0.005 MIU/L-ACNC: 2.84 UIU/ML (ref 0.3–4.2)

## 2024-04-10 PROCEDURE — 84443 ASSAY THYROID STIM HORMONE: CPT

## 2024-04-10 PROCEDURE — 36415 COLL VENOUS BLD VENIPUNCTURE: CPT

## 2024-04-10 PROCEDURE — 80048 BASIC METABOLIC PNL TOTAL CA: CPT

## 2024-04-10 PROCEDURE — 83735 ASSAY OF MAGNESIUM: CPT

## 2024-04-10 PROCEDURE — 83880 ASSAY OF NATRIURETIC PEPTIDE: CPT

## 2024-04-17 ENCOUNTER — MYC MEDICAL ADVICE (OUTPATIENT)
Dept: CARDIOLOGY | Facility: OTHER | Age: 39
End: 2024-04-17

## 2024-04-17 DIAGNOSIS — E03.4 HYPOTHYROIDISM DUE TO ACQUIRED ATROPHY OF THYROID: ICD-10-CM

## 2024-04-17 NOTE — TELEPHONE ENCOUNTER
On 04/10/2024 TSH with free T4 completed with results of 2.8 (order from Dr Salinas) and was 2.14 on 12/04/2023.    Pt would like to have her Levothyroxine changed from 112 mcg to 125 mcg.      Please advise.

## 2024-04-25 RX ORDER — LEVOTHYROXINE SODIUM 125 UG/1
125 TABLET ORAL DAILY
Qty: 90 TABLET | Refills: 0 | Status: SHIPPED | OUTPATIENT
Start: 2024-04-25 | End: 2024-07-25

## 2024-05-21 NOTE — PROGRESS NOTES
[facility] HEART CARE   CARDIOLOGY PROGRESS NOTE     Chief Complaint   Patient presents with    Follow Up     Follow up after testing          Diagnosis:  1.  CHF-systolic.   -45-50% on 12/18/2023.   -49% on cardiac MRI on 4/14/2023, [facility].   -55-60% on 12/8/2022.  2.  Frequent PVC.   -<0.1% on 10/25/2023.   -PVC ablation on 9/25/2023, [facility]-Dr. Stafford.    -PVC burden of 15.6% on 6/30/2023.  3.  History of syncope.  4.  Hypothyroidism.  5.  History of intentional drug overdose.  6.  Tobacco abuse.   -1/2 day for 17 years.  7.  CTA coronaries on 4/3/2024.   -Calcium score of 0 with no appreciable stenosis.      Assessment/Plan:    1.  CHF: Mildly reduced.  EF noted to be 51% on stress test from 1/5/2023.  Echocardiogram showed an EF of 45-50% on 12/18/2023.  Cardiac MRI on 4/14/2023 through [facility] showed an EF of 47.7%.  Patient not having significant peripheral edema or swelling.  Works out 5-6 times a week, 30 to 60 minutes at a time.  Plan to add spironolactone 25 mg daily to your current regimen.  Continue Farxiga 10 mg daily, metoprolol 50 mg XL daily, and Entresto 49/51 mg twice a day.  Plan for an echocardiogram in 3 months with follow-up thereafter.  Briefly, discussed salt restriction, fluid restriction, and daily weights .  Patient is doing well without shortness of breath.  Mother has a history of heart failure making me concerned it is genetic.  If heart function does not improve, will consider referral to advanced heart failure.  2.  Frequent PVCs: Now with an ablation.  Palpitations have improved substantially.  Noted to have increasing palpitations in 2022.  At times, has almost had near syncopal per patient. Most recent Zio patch from 10/25/2023 showed a PVC burden of less than 0.1%.  Had ablation of the [facility] on 9/25/2023 with Dr. Stafford.  No concerns today.  3.  Hypothyroidism: Now on treatment.  Care per primary.   4.  Chest discomfort: Noncardiac.  Has CTA of coronaries  "on 4/3/2024 that showed a calcium score of 0 with a widely patent arteries.  She had been describing a pressure to her chest lasting 1 to 2 minutes.  Patient has had ongoing concerns.  Normal exercise stress test on 1/5/2023.  The results of her CT of her coronaries were discussed today.  6.  Follow-up in 3 months.  Will start on spironolactone 25 mg once daily.  Will plan for an echocardiogram in 3 months and follow-up thereafter to discuss results.  If things have not improved or if they have gotten worse, consider referral to advanced heart failure.    Interval history:  See above.      HPI:    Manju Weiss presents for cardiology consult. She has no significant cardiac history. She has a non-cardiac history including hypothyroidism, pre-eclampsia, anxiety.        Ms. Weiss tells me that over the last year she has had progressively worsening symptoms of fluttering/skipping sensation in her chest as well as lightheaded, near-syncopal symptoms with exertion such as walking around [employer]. She tells me that she could be having a conversation with her  and she gets symptoms of blurred vision, darkening of vision, lightheaded. Symptoms typically improve in a few seconds but lately seems like it is taking longer. She has not experienced syncope. She does workout- she follows [employer] work-out program doing weight lifting and HIIT program. She tells me that when she is doing these workouts she does not experience symptoms of chest pain, dyspnea, lightheaded. Symptoms seem most prominent at rest- especially at night she is feeling racing/palpitations \"like there is a ticking time bomb in my chest.\"      She does endorse history of syncope as a teenager. She tells me that as a teenager she would avoid eating for extended periods of time. Did not like eating in front of people. Did not like people to know if or how much she was eating. Very thin. She relates her syncopal episodes to this. As an adult she does " not feel she has restrictive eating. No purging after eating.      Has pulmonary function testing scheduled in 2023. She does do daily Advair.       Relevant testing:  CTA coronaries on 4/3/2024:  Left Main:                            0.0  Left anterior descendin.0  Left Circumflex:                  0.0  Right coronary artery:        0.0  TOTAL:                               0.0    Left main coronary artery: Widely patent  Left anterior descending artery: Widely patent  Left circumflex artery: Widely patent  Right coronary artery: Widely patent    ECHO on 23:  No pericardial effusion is present.  Left ventricular function is decreased. The ejection fraction is 45-50% (mildly reduced).  No regional wall motion abnormalities are seen.  Global right ventricular function is normal.  Mild left atrial enlargement is present.  Trace to mild mitral insufficiency is present.  No aortic stenosis is present.  Trace to mild tricuspid insufficiency is present.  This study was compared with the study from 22 .  LV function has minimally worsened since previous study      Zio patch on 10/25/23:  Worn for 14 days and 0 hr's.  After removing artifact, total time was 13 days and 20 hr's. Placed on 2023 at 11:37 AM and completed on 2023 at 1:37 AM.  Underlying rhythm was sinus.  Hrt rate ranged from 41 bpm, maximum heart rate of 162 bmp, averaging 71 bmp.  No significant bradycardia, pauses, Mobitz type II or 3rd degree heart block.  No atrial fibrillation on this study.  x 30 triggered events and x 2 diary entries.  These corresponded to normal sinus rhythm and SVE's.  x 0 runs of VT.    x 0 runs of SVT.  Rare, <1% of PAC's, atrial couplets, atrial triplets, and PVC's.  No episodes of ventricular bigeminy.  No episodes of ventricular trigeminy.      PVC ablation on 23, [facility]:    Zio patch on 23:  PVC burden on 15.6%.    Cardiac MRI 2023:  1.   Findings consistent with  non-ischemic dilated cardiomyopathy.   2.   Mildly dilated left ventricle with mildly reduced systolic function; LVEF = 47.7%.   3.   Mildly dilated right ventricle with mildly reduced RVEF = 49%.   4.   No myocardial infarction or fibrosis.       Stress test on 1/5/23:    The nuclear stress test is abnormal.     The left ventricular ejection fraction at rest is 51%.  The left   ventricular ejection fraction at stress is 58%.     There is no prior study for comparison.   -enlarged left ventricle with global mild hypokinesis.    No reversible ischemia     ECHO on 12/8/22:  Global and regional left ventricular function is normal with an EF of 55-60%.  Mild left ventricular dilation is present.  Right ventricular function, chamber size, wall motion, and thickness are  normal.  Mild to moderate left atrial enlargement is present.  The atrial septum is intact as assessed by color Doppler and agitated saline  bubble study .  Trace to mild mitral insufficiency is present.  Trace tricuspid insufficiency is present.  Trace pulmonic insufficiency is present.      Zio patch on 11/8/22:  Worn for 13 days and 21 hr's.  After removing artifact, total time was 13 days and 19 hr's. Placed on 11/8/22 at 11:31 am and completed on 11/22/22 at 8:38 am.  Underlying rhythm was sinus  Hrt rate ranged from 44 bpm, maximum heart rate of 207 bmp, averaging 77 bmp.  No significant bradycardia, pauses, Mobitz type II or 3rd degree heart block.  No atrial fibrillation on this study.  x61 triggered events and x18 diary entries.  These corresponded to NSR, Ventricular Bigeminy and Trigeminy, SVEs and VEs.  x29 runs of VT longest lasting 11 beats with a maximum heart rate of 207 bmp.    x0 runs of SVT.  Rare, <1% of PAC's, atrial couplets, atrial triplets.  VEs were frequent at 10.9% ([id number]).  Ventricular bigeminy lasting up to 10 mins 33 sec's.  Ventricular trigeminy lasting up to 16 mins 16 sec's.              ICD-10-CM    1. Nonischemic  cardiomyopathy (H)  I42.8 spironolactone (ALDACTONE) 25 MG tablet     Echocardiogram Complete      2. Chronic systolic congestive heart failure (H)  I50.22 spironolactone (ALDACTONE) 25 MG tablet     Echocardiogram Complete      3. Benign essential hypertension  I10 spironolactone (ALDACTONE) 25 MG tablet      4. Frequent PVCs  I49.3       5. Paroxysmal ventricular tachycardia (H)  I47.29       6. Chest pain, unspecified type  R07.9 Echocardiogram Complete              Past Medical History:   Diagnosis Date    Abnormal Pap smear of cervix 11/11/2011    Formatting of this note might be different from the original. 5/2011 LSIL     (age 25)      [place] path:  CIN1 11/2011  NILM 2014 NILM 2017 NILM      31 y.o. 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 On low dose ASA prophylaxis regimen    Migraine 09/07/2023    Migraines     Nonischemic cardiomyopathy (H) 09/07/2023    Uncomplicated asthma        Past Surgical History:   Procedure Laterality Date    COLONOSCOPY  2013    EP ABLATION PVC N/A 9/25/2023    Procedure: EP Ablation PVC;  Surgeon: aSw Stafford MD;  Location:  HEART CARDIAC CATH LAB    TONSILLECTOMY  2001       Allergies   Allergen Reactions    Sulfa Antibiotics Shortness Of Breath    Latex Hives, Itching and Rash       Current Outpatient Medications   Medication Sig Dispense Refill    albuterol (PROAIR HFA/PROVENTIL HFA/VENTOLIN HFA) 108 (90 Base) MCG/ACT inhaler Inhale 2 puffs into the lungs every 4 hours as needed for shortness of breath / dyspnea or wheezing 18 g 1    APPLE CIDER VINEGAR PO       dapagliflozin (FARXIGA) 10 MG TABS tablet Take 1 tablet (10 mg) by mouth daily 90  tablet 3    fluticasone-salmeterol (WIXELA INHUB) 100-50 MCG/ACT inhaler Inhale 1 puff into the lungs every 12 hours 60 each 1    levonorgestrel (MIRENA) 52 MG (20 mcg/day) IUD 1 each by Intrauterine route once      levothyroxine (SYNTHROID/LEVOTHROID) 125 MCG tablet Take 1 tablet (125 mcg) by mouth daily 90 tablet 0    metoprolol succinate ER (TOPROL XL) 50 MG 24 hr tablet Take 1 tablet (50 mg) by mouth daily 90 tablet 1    multivitamin w/minerals (THERA-VIT-M) tablet Take 1 tablet by mouth daily      sacubitril-valsartan (ENTRESTO) 49-51 MG per tablet Take 1 tablet by mouth 2 times daily 180 tablet 1    spironolactone (ALDACTONE) 25 MG tablet Take 1 tablet (25 mg) by mouth daily 90 tablet 3       Social History     Socioeconomic History    Marital status:      Spouse name: Not on file    Number of children: Not on file    Years of education: Not on file    Highest education level: Not on file   Occupational History    Not on file   Tobacco Use    Smoking status: Every Day     Current packs/day: 0.50     Average packs/day: 0.5 packs/day for 17.0 years (8.5 ttl pk-yrs)     Types: Cigarettes    Smokeless tobacco: Never   Vaping Use    Vaping status: Never Used   Substance and Sexual Activity    Alcohol use: Yes    Drug use: Never    Sexual activity: Yes     Partners: Male     Birth control/protection: I.U.D.   Other Topics Concern    Not on file   Social History Narrative    Not on file     Social Determinants of Health     Financial Resource Strain: Low Risk  (10/23/2023)    Financial Resource Strain     Within the past 12 months, have you or your family members you live with been unable to get utilities (heat, electricity) when it was really needed?: No   Food Insecurity: Low Risk  (10/23/2023)    Food Insecurity     Within the past 12 months, did you worry that your food would run out before you got money to buy more?: No     Within the past 12 months, did the food you bought just not last and you didn t  "have money to get more?: No   Transportation Needs: Low Risk  (10/23/2023)    Transportation Needs     Within the past 12 months, has lack of transportation kept you from medical appointments, getting your medicines, non-medical meetings or appointments, work, or from getting things that you need?: No   Physical Activity: Not on file   Stress: Not on file   Social Connections: Not on file   Interpersonal Safety: Not At Risk (2/27/2024)    Received [facility] and [facility] IP Custom IPV     Do you feel UNSAFE in any of your personal relationships with your family members or any other acquaintances?: No   Housing Stability: Low Risk  (10/23/2023)    Housing Stability     Do you have housing? : Yes     Are you worried about losing your housing?: No       LAB RESULTS:   Office Visit on 01/09/2024   Component Date Value Ref Range Status    Ventricular Rate 01/09/2024 59  BPM Final    Atrial Rate 01/09/2024 59  BPM Final    MS Interval 01/09/2024 174  ms Final    QRS Duration 01/09/2024 96  ms Final    QT 01/09/2024 436  ms Final    QTc 01/09/2024 431  ms Final    P Axis 01/09/2024 39  degrees Final    R AXIS 01/09/2024 57  degrees Final    T Axis 01/09/2024 39  degrees Final    Interpretation ECG 01/09/2024    Final                    Value:Sinus bradycardia  Minimal voltage criteria for LVH, may be normal variant ( Sokolow-Quijano )  When compared with ECG of 25-SEP-2023 19:02,  Nonspecific T wave abnormality no longer evident in Lateral leads  Confirmed by MD SHANKAR DANIEL ([id number]) on 1/9/2024 4:30:36 PM          Review of systems: Negative except that which was noted in the HPI.    Physical examination:  /62 (BP Location: Left arm, Patient Position: Sitting, Cuff Size: Adult Regular)   Pulse 68   Temp 97.6  F (36.4  C) (Tympanic)   Resp 16   Ht 1.689 m (5' 6.5\")   Wt 75.3 kg (165 lb 14.4 oz)   SpO2 96%   BMI 26.38 kg/m      GENERAL APPEARANCE: healthy, alert and no " distress  CHEST: lungs clear to auscultation.  CARDIOVASCULAR: regular rhythm, normal S1 with physiologic split S2, no S3 or S4 and no murmur, click or rub  EXTREMITIES: no clubbing, cyanosis or edema.    Total time spent on day of visit, including review of tests, obtaining/reviewing separately obtained history, ordering medications/tests/procedures, communicating with PCP/consultants, and documenting in electronic medical record: 20 minutes.              Thank you for allowing me to participate in the care of your patient. Please do not hesitate to contact me if you have any questions.     Scar Salinas, DO

## 2024-05-22 ENCOUNTER — OFFICE VISIT (OUTPATIENT)
Dept: CARDIOLOGY | Facility: OTHER | Age: 39
End: 2024-05-22
Attending: INTERNAL MEDICINE
Payer: COMMERCIAL

## 2024-05-22 VITALS
BODY MASS INDEX: 26.04 KG/M2 | DIASTOLIC BLOOD PRESSURE: 62 MMHG | TEMPERATURE: 97.6 F | OXYGEN SATURATION: 96 % | HEIGHT: 67 IN | RESPIRATION RATE: 16 BRPM | HEART RATE: 68 BPM | SYSTOLIC BLOOD PRESSURE: 104 MMHG | WEIGHT: 165.9 LBS

## 2024-05-22 DIAGNOSIS — R07.9 CHEST PAIN, UNSPECIFIED TYPE: ICD-10-CM

## 2024-05-22 DIAGNOSIS — I47.29 PAROXYSMAL VENTRICULAR TACHYCARDIA (H): ICD-10-CM

## 2024-05-22 DIAGNOSIS — I42.8 NONISCHEMIC CARDIOMYOPATHY (H): Primary | ICD-10-CM

## 2024-05-22 DIAGNOSIS — I50.22 CHRONIC SYSTOLIC CONGESTIVE HEART FAILURE (H): ICD-10-CM

## 2024-05-22 DIAGNOSIS — I10 BENIGN ESSENTIAL HYPERTENSION: ICD-10-CM

## 2024-05-22 DIAGNOSIS — I49.3 FREQUENT PVCS: ICD-10-CM

## 2024-05-22 PROCEDURE — 99213 OFFICE O/P EST LOW 20 MIN: CPT | Performed by: INTERNAL MEDICINE

## 2024-05-22 RX ORDER — SPIRONOLACTONE 25 MG/1
25 TABLET ORAL DAILY
Qty: 90 TABLET | Refills: 3 | Status: SHIPPED | OUTPATIENT
Start: 2024-05-22

## 2024-05-22 ASSESSMENT — PAIN SCALES - GENERAL: PAINLEVEL: NO PAIN (0)

## 2024-05-22 NOTE — PATIENT INSTRUCTIONS
Thank you for allowing Dr VIPUL Salinas and our  team to participate in your care. Please call our office at [phone] with scheduling questions or if you need to cancel or change your appointment. With any other questions or concerns you may call cardiology nurse at  [phone].       If you experience chest pain, chest pressure, chest tightness, shortness of breath, fainting, lightheadedness, nausea, vomiting, or other concerning symptoms, please report to the Emergency Department or call 911. These symptoms may be emergent, and best treated in the Emergency Department.

## 2024-06-25 NOTE — PROGRESS NOTES
"  Assessment & Plan     1. Benign essential hypertension  Cardiology notes reviewed, cardiac echo reviewed   - Lipid Profile (Chol, Trig, HDL, LDL calc); Future  - CBC with platelets and differential; Future    2. Chronic systolic congestive heart failure (H)  stable    3. Nonischemic cardiomyopathy (H)  Stable     4. Hypothyroidism due to acquired atrophy of thyroid  TSH levels reviewed, continue 125mcg levothyroxine.    - TSH with free T4 reflex; Future    5. Wheezing  Stable, continue inhalers.       BMI  Estimated body mass index is 25.31 kg/m  as calculated from the following:    Height as of this encounter: 1.689 m (5' 6.5\").    Weight as of this encounter: 72.2 kg (159 lb 3.2 oz).     Follow-up in 6 months or as needed     The longitudinal plan of care for the diagnosis(es)/condition(s) as documented were addressed during this visit. Due to the added complexity in care, I will continue to support Manju in the subsequent management and with ongoing continuity of care.    Zohreh Aguila,   Certified Adult Nurse Practitioner  [phone]      Subjective   Manju is a 38 year old, presenting for the following health issues:  Hypertension, Thyroid Problem, Heart Failure, and Anxiety    HPI     Hypertension Follow-up    Do you check your blood pressure regularly outside of the clinic? No   Are you following a low salt diet? No  Are your blood pressures ever more than 140 on the top number (systolic) OR more   than 90 on the bottom number (diastolic), for example 140/90? No    Heart Failure Follow-up   Are you experiencing any shortness of breath? Yes, with activity only     How would you describe your shortness of breath?  Same as usual  Are you experiencing any swelling in your legs or feet?  No  Are you using more pillows than usual? No  Do you cough at night?  Yes  Do you check your weight daily?  Yes  Have you had a weight change recently?  Weight decrease  Are you having any of the following side " effects from your medications? (Select all that apply)  Dizziness, Fatigue, Cough, Swelling, and Slow heart beat  Since your last visit, how many times have you gone to the cardiologist, urgent care, emergency room, or hospital because of your heart failure?   More than 3 times  Last Echo:   Echo result w/o MOPS: Interpretation SummaryNo pericardial effusion is present.Left ventricular function is decreased. The ejection fraction is 45-50%(mildly reduced).No regional wall motion abnormalities are seen.Global right ventricular function is normal.Mild left atrial enlargement is present.Trace to mild mitral insufficiency is present.No aortic stenosis is present.Trace to mild tricuspid insufficiency is present.This study was compared with the study from 12/8/22 .LV function has minimally worsened since previous study      Anxiety   How are you doing with your anxiety since your last visit? No change  Are you having other symptoms that might be associated with anxiety? No  Have you had a significant life event? Health Concerns   Are you feeling depressed? No  Do you have any concerns with your use of alcohol or other drugs? No    Social History     Tobacco Use    Smoking status: Every Day     Current packs/day: 0.50     Average packs/day: 0.5 packs/day for 17.0 years (8.5 ttl pk-yrs)     Types: Cigarettes    Smokeless tobacco: Never   Vaping Use    Vaping status: Never Used   Substance Use Topics    Alcohol use: Yes    Drug use: Never         3/8/2021    10:00 AM   BRODIE-7 SCORE   Total Score 2         3/8/2021    10:00 AM   PHQ   PHQ-9 Total Score 4   Q9: Thoughts of better off dead/self-harm past 2 weeks Not at all         3/8/2021    10:00 AM   Last PHQ-9   1.  Little interest or pleasure in doing things 0   2.  Feeling down, depressed, or hopeless 0   3.  Trouble falling or staying asleep, or sleeping too much 2   4.  Feeling tired or having little energy 2   5.  Poor appetite or overeating 0   6.  Feeling bad about  yourself 0   7.  Trouble concentrating 0   8.  Moving slowly or restless 0   Q9: Thoughts of better off dead/self-harm past 2 weeks 0   PHQ-9 Total Score 4   Difficulty at work, home, or with people Not difficult at all         3/8/2021    10:00 AM   BRODIE-7    1. Feeling nervous, anxious, or on edge 0   2. Not being able to stop or control worrying 0   3. Worrying too much about different things 1   4. Trouble relaxing 1   5. Being so restless that it is hard to sit still 0   6. Becoming easily annoyed or irritable 0   7. Feeling afraid, as if something awful might happen 0   BRODIE-7 Total Score 2   If you checked any problems, how difficult have they made it for you to do your work, take care of things at home, or get along with other people? Not difficult at all     Hypothyroidism Follow-up    Since last visit, patient describes the following symptoms: Weight stable, no hair loss, no skin changes, no constipation, no loose stools and anxiety        Recent Labs   Lab Test 04/10/24  1012 03/12/24  1111 12/04/23  1051 09/25/23  0707 09/07/23  1115 10/13/21  1231 03/08/21  1148   A1C  --  5.3  --   --   --   --   --    LDL  --   --   --   --  58  --  82   HDL  --   --   --   --  37*  --  36*   TRIG  --   --   --   --  93  --  72   ALT  --   --   --   --  25  --   --    CR 0.69 0.74  --    < > 0.66   < > 0.72   GFRESTIMATED >90 >90  --    < > >90   < > >90   GFRESTBLACK  --   --   --   --   --   --  >90   POTASSIUM 4.1 4.0  --    < > 4.2   < > 4.1   TSH 2.84  --  2.14  --  3.14   < > 4.09*    < > = values in this interval not displayed.      BP Readings from Last 3 Encounters:   06/27/24 96/62   05/22/24 104/62   04/03/24 113/73    Wt Readings from Last 3 Encounters:   06/27/24 72.2 kg (159 lb 3.2 oz)   05/22/24 75.3 kg (165 lb 14.4 oz)   03/27/24 75.1 kg (165 lb 9.6 oz)             Review of Systems  Constitutional, neuro, ENT, endocrine, pulmonary, cardiac, gastrointestinal, genitourinary, musculoskeletal, integument  "and psychiatric systems are negative, except as otherwise noted.      Objective    BP 96/62 (BP Location: Left arm, Patient Position: Sitting, Cuff Size: Adult Regular)   Pulse 71   Temp 98.1  F (36.7  C) (Tympanic)   Resp 16   Ht 1.689 m (5' 6.5\")   Wt 72.2 kg (159 lb 3.2 oz)   SpO2 93%   BMI 25.31 kg/m    Body mass index is 25.31 kg/m .  Physical Exam   GENERAL: alert and no distress  RESP: lungs clear to auscultation - no rales, rhonchi or wheezes  CV: regular rate and rhythm, normal S1 S2, no S3 or S4, no murmur, click or rub, no peripheral edema  MS: no gross musculoskeletal defects noted, no edema  PSYCH: mentation appears normal, affect normal/bright    Lab on 04/10/2024   Component Date Value Ref Range Status    N Terminal Pro BNP Outpatient 04/10/2024 53  0 - 450 pg/mL Final    Reference range shown and results flagged as abnormal are for the outpatient, non acute settings. Establishing a baseline value for each individual patient is useful for follow-up.    Suggested inpatient cut points for confirming diagnosis of CHF in an acute setting are:  >450 pg/mL (age 18 to less than 50)  >900 pg/mL (age 50 to less than 75)  >1800 pg/mL (75 yrs and older)    An inpatient or emergency department NT-proPBNP <300 pg/mL effectively rules out acute CHF, with 99% negative predictive value.        Magnesium 04/10/2024 2.1  1.7 - 2.3 mg/dL Final    Sodium 04/10/2024 139  135 - 145 mmol/L Final    Reference intervals for this test were updated on 09/26/2023 to more accurately reflect our healthy population. There may be differences in the flagging of prior results with similar values performed with this method. Interpretation of those prior results can be made in the context of the updated reference intervals.     Potassium 04/10/2024 4.1  3.4 - 5.3 mmol/L Final    Chloride 04/10/2024 103  98 - 107 mmol/L Final    Carbon Dioxide (CO2) 04/10/2024 23  22 - 29 mmol/L Final    Anion Gap 04/10/2024 13  7 - 15 mmol/L " Final    Urea Nitrogen 04/10/2024 12.7  6.0 - 20.0 mg/dL Final    Creatinine 04/10/2024 0.69  0.51 - 0.95 mg/dL Final    GFR Estimate 04/10/2024 >90  >60 mL/min/1.73m2 Final    Calcium 04/10/2024 9.6  8.6 - 10.0 mg/dL Final    Glucose 04/10/2024 120 (H)  70 - 99 mg/dL Final    TSH 04/10/2024 2.84  0.30 - 4.20 uIU/mL Final    Hold Specimen 04/10/2024 [facility]   Final           Signed Electronically by: Zohreh Aguila, NP

## 2024-06-27 ENCOUNTER — OFFICE VISIT (OUTPATIENT)
Dept: FAMILY MEDICINE | Facility: OTHER | Age: 39
End: 2024-06-27
Attending: NURSE PRACTITIONER
Payer: COMMERCIAL

## 2024-06-27 VITALS
BODY MASS INDEX: 24.99 KG/M2 | DIASTOLIC BLOOD PRESSURE: 62 MMHG | RESPIRATION RATE: 16 BRPM | HEART RATE: 71 BPM | TEMPERATURE: 98.1 F | WEIGHT: 159.2 LBS | OXYGEN SATURATION: 93 % | SYSTOLIC BLOOD PRESSURE: 96 MMHG | HEIGHT: 67 IN

## 2024-06-27 DIAGNOSIS — I50.22 CHRONIC SYSTOLIC CONGESTIVE HEART FAILURE (H): ICD-10-CM

## 2024-06-27 DIAGNOSIS — E03.4 HYPOTHYROIDISM DUE TO ACQUIRED ATROPHY OF THYROID: ICD-10-CM

## 2024-06-27 DIAGNOSIS — I42.8 NONISCHEMIC CARDIOMYOPATHY (H): ICD-10-CM

## 2024-06-27 DIAGNOSIS — I10 BENIGN ESSENTIAL HYPERTENSION: Primary | ICD-10-CM

## 2024-06-27 DIAGNOSIS — R06.2 WHEEZING: ICD-10-CM

## 2024-06-27 LAB
BASOPHILS # BLD AUTO: 0.1 10E3/UL (ref 0–0.2)
BASOPHILS NFR BLD AUTO: 1 %
CHOLEST SERPL-MCNC: 129 MG/DL
EOSINOPHIL # BLD AUTO: 0.3 10E3/UL (ref 0–0.7)
EOSINOPHIL NFR BLD AUTO: 3 %
ERYTHROCYTE [DISTWIDTH] IN BLOOD BY AUTOMATED COUNT: 11.6 % (ref 10–15)
FASTING STATUS PATIENT QL REPORTED: NO
HCT VFR BLD AUTO: 48.5 % (ref 35–47)
HDLC SERPL-MCNC: 42 MG/DL
HGB BLD-MCNC: 16.6 G/DL (ref 11.7–15.7)
IMM GRANULOCYTES # BLD: 0 10E3/UL
IMM GRANULOCYTES NFR BLD: 0 %
LDLC SERPL CALC-MCNC: 75 MG/DL
LYMPHOCYTES # BLD AUTO: 2.1 10E3/UL (ref 0.8–5.3)
LYMPHOCYTES NFR BLD AUTO: 21 %
MCH RBC QN AUTO: 31.8 PG (ref 26.5–33)
MCHC RBC AUTO-ENTMCNC: 34.2 G/DL (ref 31.5–36.5)
MCV RBC AUTO: 93 FL (ref 78–100)
MONOCYTES # BLD AUTO: 0.7 10E3/UL (ref 0–1.3)
MONOCYTES NFR BLD AUTO: 7 %
NEUTROPHILS # BLD AUTO: 6.9 10E3/UL (ref 1.6–8.3)
NEUTROPHILS NFR BLD AUTO: 69 %
NONHDLC SERPL-MCNC: 87 MG/DL
PLATELET # BLD AUTO: 183 10E3/UL (ref 150–450)
RBC # BLD AUTO: 5.22 10E6/UL (ref 3.8–5.2)
TRIGL SERPL-MCNC: 62 MG/DL
TSH SERPL DL<=0.005 MIU/L-ACNC: 1.12 UIU/ML (ref 0.3–4.2)
WBC # BLD AUTO: 10 10E3/UL (ref 4–11)

## 2024-06-27 PROCEDURE — 99214 OFFICE O/P EST MOD 30 MIN: CPT | Performed by: NURSE PRACTITIONER

## 2024-06-27 PROCEDURE — G2211 COMPLEX E/M VISIT ADD ON: HCPCS | Performed by: NURSE PRACTITIONER

## 2024-06-27 PROCEDURE — 85025 COMPLETE CBC W/AUTO DIFF WBC: CPT | Performed by: NURSE PRACTITIONER

## 2024-06-27 PROCEDURE — 84443 ASSAY THYROID STIM HORMONE: CPT | Performed by: NURSE PRACTITIONER

## 2024-06-27 PROCEDURE — 36415 COLL VENOUS BLD VENIPUNCTURE: CPT | Performed by: NURSE PRACTITIONER

## 2024-06-27 PROCEDURE — 80061 LIPID PANEL: CPT | Performed by: NURSE PRACTITIONER

## 2024-06-27 ASSESSMENT — PAIN SCALES - GENERAL: PAINLEVEL: NO PAIN (0)

## 2024-07-03 DIAGNOSIS — I42.8 NONISCHEMIC CARDIOMYOPATHY (H): ICD-10-CM

## 2024-07-03 DIAGNOSIS — I10 BENIGN ESSENTIAL HYPERTENSION: ICD-10-CM

## 2024-07-03 DIAGNOSIS — I50.22 CHRONIC SYSTOLIC CONGESTIVE HEART FAILURE (H): ICD-10-CM

## 2024-07-03 RX ORDER — SACUBITRIL AND VALSARTAN 49; 51 MG/1; MG/1
1 TABLET, FILM COATED ORAL 2 TIMES DAILY
Qty: 180 TABLET | Refills: 3 | Status: SHIPPED | OUTPATIENT
Start: 2024-07-03

## 2024-07-03 NOTE — TELEPHONE ENCOUNTER
Disp Refills Start End [place]   sacubitril-valsartan (ENTRESTO) 49-51 MG per tablet 180 tablet 1 3/27/2024 -- No     Last Office Visit: 05/22/2024  Future Office visit:    Next 5 appointments (look out 90 days)      Aug 26, 2024 10:00 AM  (Arrive by 9:45 AM)  Return Visit with Scar Salinas DO  [facility] [place] ([facility] [place] ) [address]  [place] [address]  [phone]             Routing refill request to provider for review/approval because:

## 2024-07-24 ENCOUNTER — TELEPHONE (OUTPATIENT)
Dept: CARDIOLOGY | Facility: OTHER | Age: 39
End: 2024-07-24
Payer: COMMERCIAL

## 2024-07-25 DIAGNOSIS — E03.4 HYPOTHYROIDISM DUE TO ACQUIRED ATROPHY OF THYROID: ICD-10-CM

## 2024-07-25 RX ORDER — LEVOTHYROXINE SODIUM 125 UG/1
125 TABLET ORAL DAILY
Qty: 90 TABLET | Refills: 0 | Status: SHIPPED | OUTPATIENT
Start: 2024-07-25

## 2024-07-25 NOTE — TELEPHONE ENCOUNTER
LEVOTHYROXINE 0.125MG (125MCG) TAB       Last Written Prescription Date:  4/25/24  Last Fill Quantity: 90,   # refills: 0  Last Office Visit: 6/27/24  Future Office visit:    Next 5 appointments (look out 90 days)      Aug 26, 2024 10:00 AM  (Arrive by 9:45 AM)  Return Visit with Scar Salinas DO  [facility] ([facility] ) [address] [place] [address] [place] [address]  [phone]             Routing refill request to provider for review/approval because:  Thyroid Protocol [id number]/25/2024 03:18 AM   Protocol Details Medication indicated for associated diagnosis

## 2024-08-07 ENCOUNTER — HOSPITAL ENCOUNTER (OUTPATIENT)
Dept: CARDIOLOGY | Facility: HOSPITAL | Age: 39
Discharge: HOME | End: 2024-08-07
Attending: INTERNAL MEDICINE | Admitting: INTERNAL MEDICINE
Payer: COMMERCIAL

## 2024-08-07 DIAGNOSIS — I42.8 NONISCHEMIC CARDIOMYOPATHY (H): ICD-10-CM

## 2024-08-07 DIAGNOSIS — R07.9 CHEST PAIN, UNSPECIFIED TYPE: ICD-10-CM

## 2024-08-07 DIAGNOSIS — I50.22 CHRONIC SYSTOLIC CONGESTIVE HEART FAILURE (H): ICD-10-CM

## 2024-08-07 LAB
BI-PLANE LVEF ECHO: NORMAL
LVEF ECHO: NORMAL

## 2024-08-07 PROCEDURE — 93306 TTE W/DOPPLER COMPLETE: CPT

## 2024-08-07 PROCEDURE — 93306 TTE W/DOPPLER COMPLETE: CPT | Mod: 26 | Performed by: INTERNAL MEDICINE

## 2024-08-26 ENCOUNTER — OFFICE VISIT (OUTPATIENT)
Dept: CARDIOLOGY | Facility: OTHER | Age: 39
End: 2024-08-26
Attending: INTERNAL MEDICINE
Payer: COMMERCIAL

## 2024-08-26 VITALS
SYSTOLIC BLOOD PRESSURE: 110 MMHG | DIASTOLIC BLOOD PRESSURE: 71 MMHG | BODY MASS INDEX: 24.96 KG/M2 | OXYGEN SATURATION: 98 % | RESPIRATION RATE: 16 BRPM | WEIGHT: 159 LBS | HEIGHT: 67 IN | HEART RATE: 58 BPM

## 2024-08-26 DIAGNOSIS — I49.3 FREQUENT PVCS: ICD-10-CM

## 2024-08-26 DIAGNOSIS — I50.22 CHRONIC SYSTOLIC CONGESTIVE HEART FAILURE (H): Primary | ICD-10-CM

## 2024-08-26 DIAGNOSIS — I42.8 NONISCHEMIC CARDIOMYOPATHY (H): ICD-10-CM

## 2024-08-26 DIAGNOSIS — I47.29 PAROXYSMAL VENTRICULAR TACHYCARDIA (H): ICD-10-CM

## 2024-08-26 DIAGNOSIS — I10 BENIGN ESSENTIAL HYPERTENSION: ICD-10-CM

## 2024-08-26 PROCEDURE — 99214 OFFICE O/P EST MOD 30 MIN: CPT | Performed by: INTERNAL MEDICINE

## 2024-08-26 ASSESSMENT — PAIN SCALES - GENERAL: PAINLEVEL: NO PAIN (0)

## 2024-08-26 NOTE — PROGRESS NOTES
[facility] HEART CARE   CARDIOLOGY PROGRESS NOTE     Chief Complaint   Patient presents with    Follow Up          Diagnosis:  1.  CHF-systolic.   -55-60% on 8/7/2024.   -45-50% on 12/18/2023.   -49% on cardiac MRI on 4/14/2023, [facility].   -55-60% on 12/8/2022.  2.  Frequent PVC.   -<0.1% on 10/25/2023.   -PVC ablation on 9/25/2023, [facility]-Dr. Stafford.    -PVC burden of 15.6% on 6/30/2023.  3.  History of syncope.  4.  Hypothyroidism.  5.  History of intentional drug overdose.  6.  Tobacco abuse.   -1/2 day for 17 years.  7.  CTA coronaries on 4/3/2024.   -Calcium score of 0 with no appreciable stenosis.      Assessment/Plan:    1.  CHF: Now has normalized.  EF of 55 to 60% on 8/7/2024.  EF noted to be 51% on stress test from 1/5/2023.  Echocardiogram showed an EF of 45-50% on 12/18/2023.  Cardiac MRI on 4/14/2023 through [facility] showed an EF of 47.7%.  Patient not having significant peripheral edema or swelling.  Works out 5-6 times a week, 30 to 60 minutes at a time.  Patient stable.  EF normalized as outlined above.  Continue spironolactone 25 mg daily, Farxiga 10 mg daily, metoprolol 50 mg XL daily, and Entresto 49/51 mg twice a day.  Plan for an echocardiogram in 3 months with follow-up thereafter.  Briefly, discussed salt restriction, fluid restriction, and daily weights. Mother has a history of heart failure making me concerned it is genetic.  Stable.  If it heart EF drops, would consider referral to advanced heart failure.  2.  Frequent PVCs: Now with an ablation.  Palpitations have improved substantially.  Noted to have increasing palpitations in 2022.  At times, has almost had near syncopal per patient. Most recent Zio patch from 10/25/2023 showed a PVC burden of less than 0.1%.  Had ablation of the [facility] on 9/25/2023 with Dr. Stafford.  Having chest discomfort.  Has a Apple watch.  Rhythm strips reviewed.  No significant concerns.  No PVCs.  3.  Chest discomfort: Atypical.  Describes  "sharp discomfort which is substernal and left precordial.  Her symptoms are potentially pressure-like.  Lasting up to 20 minutes.  It comes on at rest.  Not affected by activity.  He has random episodes which are not happening on a regular basis.  Etiology unknown.  Possibly gallbladder???  Had a normal CTA of coronaries on/3/24.  4.  Tobacco abuse: I believes continues to smoke.  Encouraged to quit.  5.  Plan for an echocardiogram in 1 year and follow-up thereafter.  Echocardiogram for heart failure.      Interval history:  See above.      HPI:    Manju Weiss presents for cardiology consult. She has no significant cardiac history. She has a non-cardiac history including hypothyroidism, pre-eclampsia, anxiety.        Ms. Weiss tells me that over the last year she has had progressively worsening symptoms of fluttering/skipping sensation in her chest as well as lightheaded, near-syncopal symptoms with exertion such as walking around [employer]. She tells me that she could be having a conversation with her  and she gets symptoms of blurred vision, darkening of vision, lightheaded. Symptoms typically improve in a few seconds but lately seems like it is taking longer. She has not experienced syncope. She does workout- she follows [employer] work-out program doing weight lifting and HIIT program. She tells me that when she is doing these workouts she does not experience symptoms of chest pain, dyspnea, lightheaded. Symptoms seem most prominent at rest- especially at night she is feeling racing/palpitations \"like there is a ticking time bomb in my chest.\"      She does endorse history of syncope as a teenager. She tells me that as a teenager she would avoid eating for extended periods of time. Did not like eating in front of people. Did not like people to know if or how much she was eating. Very thin. She relates her syncopal episodes to this. As an adult she does not feel she has restrictive eating. No purging " after eating.      Has pulmonary function testing scheduled in 2023. She does do daily Advair.       Relevant testing:  ECHO on 24:  Left ventricular function is normal.The ejection fraction is 55-60%.  Global right ventricular function is normal.  No significant valvular abnormalities present.  This study was compared with the study from 23: No significant changes  noted. Specifically, LVEF is unchanged on direct visual comparison with the  images from the prior study.      CTA coronaries on 4/3/2024:  Left Main:                            0.0  Left anterior descendin.0  Left Circumflex:                  0.0  Right coronary artery:        0.0  TOTAL:                               0.0    Left main coronary artery: Widely patent  Left anterior descending artery: Widely patent  Left circumflex artery: Widely patent  Right coronary artery: Widely patent    ECHO on 23:  No pericardial effusion is present.  Left ventricular function is decreased. The ejection fraction is 45-50% (mildly reduced).  No regional wall motion abnormalities are seen.  Global right ventricular function is normal.  Mild left atrial enlargement is present.  Trace to mild mitral insufficiency is present.  No aortic stenosis is present.  Trace to mild tricuspid insufficiency is present.  This study was compared with the study from 22 .  LV function has minimally worsened since previous study      Zio patch on 10/25/23:  Worn for 14 days and 0 hr's.  After removing artifact, total time was 13 days and 20 hr's. Placed on 2023 at 11:37 AM and completed on 2023 at 1:37 AM.  Underlying rhythm was sinus.  Hrt rate ranged from 41 bpm, maximum heart rate of 162 bmp, averaging 71 bmp.  No significant bradycardia, pauses, Mobitz type II or 3rd degree heart block.  No atrial fibrillation on this study.  x 30 triggered events and x 2 diary entries.  These corresponded to normal sinus rhythm and SVE's.  x  0 runs of VT.    x 0 runs of SVT.  Rare, <1% of PAC's, atrial couplets, atrial triplets, and PVC's.  No episodes of ventricular bigeminy.  No episodes of ventricular trigeminy.      PVC ablation on 9/25/23, [facility]:    Zio patch on 6/30/23:  PVC burden on 15.6%.    Cardiac MRI 9/14/2023:  1.   Findings consistent with non-ischemic dilated cardiomyopathy.   2.   Mildly dilated left ventricle with mildly reduced systolic function; LVEF = 47.7%.   3.   Mildly dilated right ventricle with mildly reduced RVEF = 49%.   4.   No myocardial infarction or fibrosis.       Stress test on 1/5/23:    The nuclear stress test is abnormal.     The left ventricular ejection fraction at rest is 51%.  The left   ventricular ejection fraction at stress is 58%.     There is no prior study for comparison.   -enlarged left ventricle with global mild hypokinesis.    No reversible ischemia     ECHO on 12/8/22:  Global and regional left ventricular function is normal with an EF of 55-60%.  Mild left ventricular dilation is present.  Right ventricular function, chamber size, wall motion, and thickness are  normal.  Mild to moderate left atrial enlargement is present.  The atrial septum is intact as assessed by color Doppler and agitated saline  bubble study .  Trace to mild mitral insufficiency is present.  Trace tricuspid insufficiency is present.  Trace pulmonic insufficiency is present.      Zio patch on 11/8/22:  Worn for 13 days and 21 hr's.  After removing artifact, total time was 13 days and 19 hr's. Placed on 11/8/22 at 11:31 am and completed on 11/22/22 at 8:38 am.  Underlying rhythm was sinus  Hrt rate ranged from 44 bpm, maximum heart rate of 207 bmp, averaging 77 bmp.  No significant bradycardia, pauses, Mobitz type II or 3rd degree heart block.  No atrial fibrillation on this study.  x61 triggered events and x18 diary entries.  These corresponded to NSR, Ventricular Bigeminy and Trigeminy, SVEs and VEs.  x29 runs of VT longest  lasting 11 beats with a maximum heart rate of 207 bmp.    x0 runs of SVT.  Rare, <1% of PAC's, atrial couplets, atrial triplets.  VEs were frequent at 10.9% ([id number]).  Ventricular bigeminy lasting up to 10 mins 33 sec's.  Ventricular trigeminy lasting up to 16 mins 16 sec's.              ICD-10-CM    1. Chronic systolic congestive heart failure (H)  I50.22 Echocardiogram Complete      2. Benign essential hypertension  I10 Echocardiogram Complete      3. Frequent PVCs  I49.3       4. Nonischemic cardiomyopathy (H)  I42.8 Echocardiogram Complete      5. Paroxysmal ventricular tachycardia (H)  I47.29                 Past Medical History:   Diagnosis Date    Abnormal Pap smear of cervix 11/11/2011    Formatting of this note might be different from the original. 5/2011 LSIL     (age 25)      [place] path:  CIN1 11/2011  NILM 2014 NILM 2017 NILM      31 y.o. 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 On low dose ASA prophylaxis regimen    Migraine 09/07/2023    Migraines     Nonischemic cardiomyopathy (H) 09/07/2023    Uncomplicated asthma        Past Surgical History:   Procedure Laterality Date    COLONOSCOPY  2013    EP ABLATION PVC N/A 9/25/2023    Procedure: EP Ablation PVC;  Surgeon: Saw Stafford MD;  Location:  HEART CARDIAC CATH LAB    TONSILLECTOMY  2001       Allergies   Allergen Reactions    Sulfa Antibiotics Shortness Of Breath    Latex Hives, Itching and Rash       Current Outpatient Medications   Medication Sig Dispense Refill    albuterol (PROAIR HFA/PROVENTIL HFA/VENTOLIN HFA) 108 (90 Base) MCG/ACT inhaler Inhale 2 puffs into the lungs every 4 hours as needed for shortness of  breath / dyspnea or wheezing 18 g 1    APPLE CIDER VINEGAR PO       dapagliflozin (FARXIGA) 10 MG TABS tablet Take 1 tablet (10 mg) by mouth daily 90 tablet 3    fluticasone-salmeterol (WIXELA INHUB) 100-50 MCG/ACT inhaler Inhale 1 puff into the lungs every 12 hours 60 each 1    levonorgestrel (MIRENA) 52 MG (20 mcg/day) IUD 1 each by Intrauterine route once      levothyroxine (SYNTHROID/LEVOTHROID) 125 MCG tablet TAKE 1 TABLET(125 MCG) BY MOUTH DAILY 90 tablet 0    metoprolol succinate ER (TOPROL XL) 50 MG 24 hr tablet Take 1 tablet (50 mg) by mouth daily 90 tablet 1    multivitamin w/minerals (THERA-VIT-M) tablet Take 1 tablet by mouth daily      sacubitril-valsartan (ENTRESTO) 49-51 MG per tablet TAKE 1 TABLET BY MOUTH TWICE DAILY 180 tablet 3    spironolactone (ALDACTONE) 25 MG tablet Take 1 tablet (25 mg) by mouth daily 90 tablet 3       Social History     Socioeconomic History    Marital status:      Spouse name: Not on file    Number of children: Not on file    Years of education: Not on file    Highest education level: Not on file   Occupational History    Not on file   Tobacco Use    Smoking status: Every Day     Current packs/day: 0.50     Average packs/day: 0.5 packs/day for 17.0 years (8.5 ttl pk-yrs)     Types: Cigarettes    Smokeless tobacco: Never   Vaping Use    Vaping status: Never Used   Substance and Sexual Activity    Alcohol use: Yes    Drug use: Never    Sexual activity: Yes     Partners: Male     Birth control/protection: I.U.D.   Other Topics Concern    Not on file   Social History Narrative    Not on file     Social Determinants of Health     Financial Resource Strain: Low Risk  (10/23/2023)    Financial Resource Strain     Within the past 12 months, have you or your family members you live with been unable to get utilities (heat, electricity) when it was really needed?: No   Food Insecurity: Low Risk  (10/23/2023)    Food Insecurity     Within the past 12 months, did you worry that  your food would run out before you got money to buy more?: No     Within the past 12 months, did the food you bought just not last and you didn t have money to get more?: No   Transportation Needs: Low Risk  (10/23/2023)    Transportation Needs     Within the past 12 months, has lack of transportation kept you from medical appointments, getting your medicines, non-medical meetings or appointments, work, or from getting things that you need?: No   Physical Activity: Not on file   Stress: Not on file   Social Connections: Not on file   Interpersonal Safety: Low Risk  (6/27/2024)    Interpersonal Safety     Do you feel physically and emotionally safe where you currently live?: Yes     Within the past 12 months, have you been hit, slapped, kicked or otherwise physically hurt by someone?: No     Within the past 12 months, have you been humiliated or emotionally abused in other ways by your partner or ex-partner?: No   Housing Stability: Low Risk  (10/23/2023)    Housing Stability     Do you have housing? : Yes     Are you worried about losing your housing?: No       LAB RESULTS:   Office Visit on 01/09/2024   Component Date Value Ref Range Status    Ventricular Rate 01/09/2024 59  BPM Final    Atrial Rate 01/09/2024 59  BPM Final    MI Interval 01/09/2024 174  ms Final    QRS Duration 01/09/2024 96  ms Final    QT 01/09/2024 436  ms Final    QTc 01/09/2024 431  ms Final    P Axis 01/09/2024 39  degrees Final    R AXIS 01/09/2024 57  degrees Final    T Axis 01/09/2024 39  degrees Final    Interpretation ECG 01/09/2024    Final                    Value:Sinus bradycardia  Minimal voltage criteria for LVH, may be normal variant ( Sokolow-Quijano )  When compared with ECG of 25-SEP-2023 19:02,  Nonspecific T wave abnormality no longer evident in Lateral leads  Confirmed by MD SHANKAR DANIEL ([id number]) on 1/9/2024 4:30:36 PM          Review of systems: Negative except that which was noted in the HPI.    Physical examination:  BP  "110/71   Pulse 58   Resp 16   Ht 1.689 m (5' 6.5\")   Wt 72.1 kg (159 lb)   SpO2 98%   BMI 25.28 kg/m      GENERAL APPEARANCE: healthy, alert and no distress  CHEST: lungs clear to auscultation.  CARDIOVASCULAR: regular rhythm, normal S1 with physiologic split S2, no S3 or S4 and no murmur, click or rub  EXTREMITIES: no clubbing, cyanosis or edema.    Total time spent on day of visit, including review of tests, obtaining/reviewing separately obtained history, ordering medications/tests/procedures, communicating with PCP/consultants, and documenting in electronic medical record: 24 minutes.              Thank you for allowing me to participate in the care of your patient. Please do not hesitate to contact me if you have any questions.     Scar Salinas, DO          "

## 2024-08-26 NOTE — PATIENT INSTRUCTIONS
Thank you for allowing Dr VIDHI Salinas and our  team to participate in your care. Please call our office at [phone] with scheduling questions or if you need to cancel or change your appointment. With any other questions or concerns you may call cardiology nurse at  [phone].       If you experience chest pain, chest pressure, chest tightness, shortness of breath, fainting, lightheadedness, nausea, vomiting, or other concerning symptoms, please report to the Emergency Department or call 911. These symptoms may be emergent, and best treated in the Emergency Department.

## 2024-09-19 DIAGNOSIS — I10 BENIGN ESSENTIAL HYPERTENSION: ICD-10-CM

## 2024-09-19 DIAGNOSIS — I42.8 NONISCHEMIC CARDIOMYOPATHY (H): ICD-10-CM

## 2024-09-19 DIAGNOSIS — I47.29 PAROXYSMAL VENTRICULAR TACHYCARDIA (H): ICD-10-CM

## 2024-09-19 DIAGNOSIS — I49.3 FREQUENT PVCS: ICD-10-CM

## 2024-09-19 DIAGNOSIS — I50.22 CHRONIC SYSTOLIC CONGESTIVE HEART FAILURE (H): ICD-10-CM

## 2024-09-19 RX ORDER — METOPROLOL SUCCINATE 50 MG/1
50 TABLET, EXTENDED RELEASE ORAL DAILY
Qty: 90 TABLET | Refills: 1 | Status: SHIPPED | OUTPATIENT
Start: 2024-09-19

## 2024-09-19 NOTE — TELEPHONE ENCOUNTER
metoprolol succinate ER (TOPROL XL) 50 MG 24 hr tablet 90 tablet 1 3/27/2024     Last Office Visit: 08/26/2024  Future Office visit:       Routing refill request to provider for review/approval because:

## 2024-09-19 NOTE — TELEPHONE ENCOUNTER
Requested Prescriptions   Pending Prescriptions Disp Refills    metoprolol succinate ER (TOPROL XL) 50 MG 24 hr tablet [Pharmacy Med Name: METOPROLOL ER SUCCINATE 50MG TABS] 90 tablet 1     Sig: TAKE 1 TABLET(50 MG) BY MOUTH DAILY       Beta-Blockers Protocol Passed - 9/19/2024  7:45 AM        Passed - Blood pressure under 140/90 in past 12 months     BP Readings from Last 3 Encounters:   08/26/24 110/71   06/27/24 96/62   05/22/24 104/62       No data recorded            Passed - Patient is age 6 or older        Passed - Medication is active on med list        Passed - Medication indicated for associated diagnosis     Medication is associated with one or more of the following diagnoses:     Hypertension (HTN)   Atrial fibrillation/flutter   Angina   ASCVD   Migraine   Heart Failure   Tremor   Anxiety   Ocular hypertension   Glaucoma   PTSD   Obstructive hypertrophic cardiomyopathy   History of myocarditis   Palpitations   POTS (postural orthostatic tachycardia syndrome)   SVT (supraventricular tachycardia)   Hyperthyroidism   Tachycardia          Passed - Recent (12 mo) or future (90 days) visit within the authorizing provider's specialty     The patient must have completed an in-person or virtual visit within the past 12 months or has a future visit scheduled within the next 90 days with the authorizing provider s specialty.  Urgent care and e-visits do not quality as an office visit for this protocol.             Prescription approved per [facility] Refill Protocol.

## 2024-10-21 ENCOUNTER — MYC REFILL (OUTPATIENT)
Dept: FAMILY MEDICINE | Facility: OTHER | Age: 39
End: 2024-10-21

## 2024-10-21 DIAGNOSIS — E03.4 HYPOTHYROIDISM DUE TO ACQUIRED ATROPHY OF THYROID: ICD-10-CM

## 2024-10-21 RX ORDER — LEVOTHYROXINE SODIUM 125 UG/1
125 TABLET ORAL DAILY
Qty: 90 TABLET | Refills: 2 | Status: SHIPPED | OUTPATIENT
Start: 2024-10-21

## 2024-12-20 DIAGNOSIS — I47.29 PAROXYSMAL VENTRICULAR TACHYCARDIA (H): ICD-10-CM

## 2024-12-20 DIAGNOSIS — I49.3 FREQUENT PVCS: ICD-10-CM

## 2024-12-20 DIAGNOSIS — I42.8 NONISCHEMIC CARDIOMYOPATHY (H): ICD-10-CM

## 2024-12-20 DIAGNOSIS — I50.22 CHRONIC SYSTOLIC CONGESTIVE HEART FAILURE (H): ICD-10-CM

## 2024-12-20 DIAGNOSIS — I10 BENIGN ESSENTIAL HYPERTENSION: ICD-10-CM

## 2024-12-23 RX ORDER — METOPROLOL SUCCINATE 50 MG/1
50 TABLET, EXTENDED RELEASE ORAL DAILY
Qty: 90 TABLET | Refills: 1 | Status: SHIPPED | OUTPATIENT
Start: 2024-12-23

## 2024-12-23 NOTE — TELEPHONE ENCOUNTER
Requested Prescriptions   Pending Prescriptions Disp Refills    metoprolol succinate ER (TOPROL XL) 50 MG 24 hr tablet [Pharmacy Med Name: METOPROLOL ER SUCCINATE 50MG TABS] 90 tablet 1     Sig: TAKE 1 TABLET(50 MG) BY MOUTH DAILY       Beta-Blockers Protocol Passed - 12/23/2024 10:26 AM        Passed - Most recent blood pressure under 140/90 in past 12 months     BP Readings from Last 3 Encounters:   08/26/24 110/71   06/27/24 96/62   05/22/24 104/62       No data recorded            Passed - Patient is age 6 or older        Passed - Medication is active on med list        Passed - Medication indicated for associated diagnosis     Medication is associated with one or more of the following diagnoses:     Hypertension (HTN)   Atrial fibrillation/flutter   Angina   ASCVD   Migraine   Heart Failure   Tremor   Anxiety   Ocular hypertension   Glaucoma   PTSD   Obstructive hypertrophic cardiomyopathy   History of myocarditis   Palpitations   POTS (postural orthostatic tachycardia syndrome)   SVT (supraventricular tachycardia)   Hyperthyroidism   Tachycardia   Acute non-st segment elevation myocardial infarction   Subsequent non-st segment elevation myocardial infarction   Ischemic myocardial dysfunction          Passed - Recent (12 mo) or future (90 days) visit within the authorizing provider's specialty     The patient must have completed an in-person or virtual visit within the past 12 months or has a future visit scheduled within the next 90 days with the authorizing provider s specialty.  Urgent care and e-visits do not qualify as an office visit for this protocol.             Prescription approved per [facility] Refill Protocol.

## 2024-12-30 NOTE — PROGRESS NOTES
"  Assessment & Plan     1. Benign essential hypertension (Primary)  Continue current plan   - Lipid Profile; Future  - Comprehensive metabolic panel; Future    2. Nonischemic cardiomyopathy (H)  Cardiology notes reviewed, continue current plan     3. Chronic systolic congestive heart failure (H)  As above  - BNP-N terminal pro; Future    4. Hypothyroidism due to acquired atrophy of thyroid  Continue levothyroxine   - TSH with free T4 reflex; Future    5. Frequent PVCs  Repeat Zio patch  - ZIO PATCH - RANGE - MAIL OUT; Future        Nicotine/Tobacco Cessation  She reports that she has been smoking cigarettes. She has a 8.5 pack-year smoking history. She has never used smokeless tobacco.  Nicotine/Tobacco Cessation Plan  Information offered: Patient not interested at this time      BMI  Estimated body mass index is 26.23 kg/m  as calculated from the following:    Height as of 8/26/24: 1.689 m (5' 6.5\").    Weight as of this encounter: 74.8 kg (165 lb).     Follow-up in 6 months or as needed     The longitudinal plan of care for the diagnosis(es)/condition(s) as documented were addressed during this visit. Due to the added complexity in care, I will continue to support Manju in the subsequent management and with ongoing continuity of care.    Zohreh Aguila,   Certified Adult Nurse Practitioner  [phone]      Feroz Nunes is a 39 year old, presenting for the following health issues:  Hypertension, Heart Failure, and Thyroid Problem    History of Present Illness       Hypothyroidism:     Since last visit, patient describes the following symptoms::  Fatigue and Weight gain    Weight gain::  6-10 lbs.    Vascular Disease:  She presents for follow up of vascular disease.     She never takes nitroglycerin. She is not taking daily aspirin.    She eats 2-3 servings of fruits and vegetables daily.She consumes 1 sweetened beverage(s) daily.She exercises with enough effort to increase her heart rate 30 to 60 " minutes per day.  She exercises with enough effort to increase her heart rate 5 days per week. She is missing 1 dose(s) of medications per week.  She is not taking prescribed medications regularly due to remembering to take.       Hypertension Follow-up    Do you check your blood pressure regularly outside of the clinic? Yes   Are you following a low salt diet? Yes  Are your blood pressures ever more than 140 on the top number (systolic) OR more   than 90 on the bottom number (diastolic), for example 140/90? No    Heart Failure Follow-up   Are you experiencing any shortness of breath? No  Are you experiencing any swelling in your legs or feet?  No  Are you using more pillows than usual? No  Do you cough at night?  No  Do you check your weight daily?  No  Have you had a weight change recently?  No  Are you having any of the following side effects from your medications? (Select all that apply)  The patient does not report symptoms of dizziness, fatigue, cough, swelling, or slow heart beat.  Since your last visit, how many times have you gone to the cardiologist, urgent care, emergency room, or hospital because of your heart failure?   None  Notes increased palpations again, not as many as prior to ablation but seems to be worsening.    Last Echo:   Echo result w/o MOPS: Interpretation SummaryLeft ventricular function is normal.The ejection fraction is 55-60%.Global right ventricular function is normal.No significant valvular abnormalities present.This study was compared with the study from 12/18/23: No significant changesnoted. Specifically, LVEF is unchanged on direct visual comparison with theimages from the prior study.      Hypothyroidism Follow-up    Since last visit, patient describes the following symptoms: weight gain of 10 lbs and fatigue    She also notes neck pain that radiations into right shoulder and left hip pain.  Both have been for the past week and feels they will resolve over time.  Declined XR  today.      Recent Labs   Lab Test 06/27/24  1149 04/10/24  1012 03/12/24  1111 09/25/23  0707 09/07/23  1115 10/13/21  1231 03/08/21  1148   A1C  --   --  5.3  --   --   --   --    LDL 75  --   --   --  58  --  82   HDL 42*  --   --   --  37*  --  36*   TRIG 62  --   --   --  93  --  72   ALT  --   --   --   --  25  --   --    CR  --  0.69 0.74   < > 0.66   < > 0.72   GFRESTIMATED  --  >90 >90   < > >90   < > >90   GFRESTBLACK  --   --   --   --   --   --  >90   POTASSIUM  --  4.1 4.0   < > 4.2   < > 4.1   TSH 1.12 2.84  --    < > 3.14   < > 4.09*    < > = values in this interval not displayed.      BP Readings from Last 3 Encounters:   01/02/25 112/72   08/26/24 110/71   06/27/24 96/62    Wt Readings from Last 3 Encounters:   01/02/25 74.8 kg (165 lb)   08/26/24 72.1 kg (159 lb)   06/27/24 72.2 kg (159 lb 3.2 oz)                Review of Systems  Constitutional, neuro, ENT, endocrine, pulmonary, cardiac, gastrointestinal, genitourinary, musculoskeletal, integument and psychiatric systems are negative, except as otherwise noted.      Objective    /72 (BP Location: Right arm, Patient Position: Sitting, Cuff Size: Adult Regular)   Pulse 65   Temp 97.5  F (36.4  C) (Tympanic)   Resp 16   Wt 74.8 kg (165 lb)   SpO2 97%   Breastfeeding No   BMI 26.23 kg/m    Body mass index is 26.23 kg/m .  Physical Exam   GENERAL: alert and no distress  NECK: no adenopathy, no asymmetry, masses, or scars, thyroid normal to palpation, and no carotid bruits  RESP: lungs clear to auscultation - no rales, rhonchi or wheezes  CV: regular rate and rhythm, normal S1 S2, no S3 or S4, no murmur  MS: no gross musculoskeletal defects noted, no edema  PSYCH: mentation appears normal, affect normal/bright          Results for orders placed or performed in visit on 01/02/25   Lipid Profile     Status: Abnormal   Result Value Ref Range    Cholesterol 153 <200 mg/dL    Triglycerides 100 <150 mg/dL    Direct Measure HDL 45 (L) >=50 mg/dL     LDL Cholesterol Calculated 88 <100 mg/dL    Non HDL Cholesterol 108 <130 mg/dL    Patient Fasting > 8hrs? Yes     Narrative    Cholesterol  Desirable: < 200 mg/dL  Borderline High: 200 - 239 mg/dL  High: >= 240 mg/dL    Triglycerides  Normal: < 150 mg/dL  Borderline High: 150 - 199 mg/dL  High: 200-499 mg/dL  Very High: >= 500 mg/dL    Direct Measure HDL  Female: >= 50 mg/dL   Male: >= 40 mg/dL    LDL Cholesterol  Desirable: < 100 mg/dL  Above Desirable: 100 - 129 mg/dL   Borderline High: 130 - 159 mg/dL   High:  160 - 189 mg/dL   Very High: >= 190 mg/dL    Non HDL Cholesterol  Desirable: < 130 mg/dL  Above Desirable: 130 - 159 mg/dL  Borderline High: 160 - 189 mg/dL  High: 190 - 219 mg/dL  Very High: >= 220 mg/dL   Comprehensive metabolic panel     Status: Abnormal   Result Value Ref Range    Sodium 140 135 - 145 mmol/L    Potassium 4.0 3.4 - 5.3 mmol/L    Carbon Dioxide (CO2) 18 (L) 22 - 29 mmol/L    Anion Gap 18 (H) 7 - 15 mmol/L    Urea Nitrogen 13.0 6.0 - 20.0 mg/dL    Creatinine 0.71 0.51 - 0.95 mg/dL    GFR Estimate >90 >60 mL/min/1.73m2    Calcium 9.1 8.8 - 10.4 mg/dL    Chloride 104 98 - 107 mmol/L    Glucose 99 70 - 99 mg/dL    Alkaline Phosphatase 80 40 - 150 U/L    AST 28 0 - 45 U/L    ALT 58 (H) 0 - 50 U/L    Protein Total 7.3 6.4 - 8.3 g/dL    Albumin 4.6 3.5 - 5.2 g/dL    Bilirubin Total 0.4 <=1.2 mg/dL    Patient Fasting > 8hrs? Yes    TSH with free T4 reflex     Status: Normal   Result Value Ref Range    TSH 2.84 0.30 - 4.20 uIU/mL   BNP-N terminal pro     Status: Normal   Result Value Ref Range    N Terminal Pro BNP Outpatient <36 0 - 450 pg/mL   Extra Tube     Status: None    Narrative    The following orders were created for panel order Extra Tube.  Procedure                               Abnormality         Status                     ---------                               -----------         ------                     Extra Purple Top Tube[[id number]]                            Final result                  Please view results for these tests on the individual orders.   Extra Purple Top Tube     Status: None   Result Value Ref Range    Hold Specimen JIC          Signed Electronically by: Zohreh Aguila NP

## 2025-01-02 ENCOUNTER — ORDERS ONLY (AUTO-RELEASED) (OUTPATIENT)
Dept: FAMILY MEDICINE | Facility: OTHER | Age: 40
End: 2025-01-02

## 2025-01-02 ENCOUNTER — OFFICE VISIT (OUTPATIENT)
Dept: FAMILY MEDICINE | Facility: OTHER | Age: 40
End: 2025-01-02
Attending: NURSE PRACTITIONER
Payer: COMMERCIAL

## 2025-01-02 VITALS
OXYGEN SATURATION: 97 % | RESPIRATION RATE: 16 BRPM | HEART RATE: 65 BPM | DIASTOLIC BLOOD PRESSURE: 72 MMHG | BODY MASS INDEX: 26.23 KG/M2 | WEIGHT: 165 LBS | SYSTOLIC BLOOD PRESSURE: 112 MMHG | TEMPERATURE: 97.5 F

## 2025-01-02 DIAGNOSIS — I49.3 FREQUENT PVCS: ICD-10-CM

## 2025-01-02 DIAGNOSIS — I10 BENIGN ESSENTIAL HYPERTENSION: Primary | ICD-10-CM

## 2025-01-02 DIAGNOSIS — E03.4 HYPOTHYROIDISM DUE TO ACQUIRED ATROPHY OF THYROID: ICD-10-CM

## 2025-01-02 DIAGNOSIS — I50.22 CHRONIC SYSTOLIC CONGESTIVE HEART FAILURE (H): ICD-10-CM

## 2025-01-02 DIAGNOSIS — I42.8 NONISCHEMIC CARDIOMYOPATHY (H): ICD-10-CM

## 2025-01-02 LAB
ALBUMIN SERPL BCG-MCNC: 4.6 G/DL (ref 3.5–5.2)
ALP SERPL-CCNC: 80 U/L (ref 40–150)
ALT SERPL W P-5'-P-CCNC: 58 U/L (ref 0–50)
ANION GAP SERPL CALCULATED.3IONS-SCNC: 18 MMOL/L (ref 7–15)
AST SERPL W P-5'-P-CCNC: 28 U/L (ref 0–45)
BILIRUB SERPL-MCNC: 0.4 MG/DL
BUN SERPL-MCNC: 13 MG/DL (ref 6–20)
CALCIUM SERPL-MCNC: 9.1 MG/DL (ref 8.8–10.4)
CHLORIDE SERPL-SCNC: 104 MMOL/L (ref 98–107)
CHOLEST SERPL-MCNC: 153 MG/DL
CREAT SERPL-MCNC: 0.71 MG/DL (ref 0.51–0.95)
EGFRCR SERPLBLD CKD-EPI 2021: >90 ML/MIN/1.73M2
FASTING STATUS PATIENT QL REPORTED: YES
FASTING STATUS PATIENT QL REPORTED: YES
GLUCOSE SERPL-MCNC: 99 MG/DL (ref 70–99)
HCO3 SERPL-SCNC: 18 MMOL/L (ref 22–29)
HDLC SERPL-MCNC: 45 MG/DL
HOLD SPECIMEN: NORMAL
LDLC SERPL CALC-MCNC: 88 MG/DL
NONHDLC SERPL-MCNC: 108 MG/DL
NT-PROBNP SERPL-MCNC: <36 PG/ML (ref 0–450)
POTASSIUM SERPL-SCNC: 4 MMOL/L (ref 3.4–5.3)
PROT SERPL-MCNC: 7.3 G/DL (ref 6.4–8.3)
SODIUM SERPL-SCNC: 140 MMOL/L (ref 135–145)
TRIGL SERPL-MCNC: 100 MG/DL
TSH SERPL DL<=0.005 MIU/L-ACNC: 2.84 UIU/ML (ref 0.3–4.2)

## 2025-01-02 ASSESSMENT — PAIN SCALES - GENERAL: PAINLEVEL_OUTOF10: NO PAIN (0)

## 2025-01-27 DIAGNOSIS — I42.8 NONISCHEMIC CARDIOMYOPATHY (H): ICD-10-CM

## 2025-01-27 DIAGNOSIS — I50.22 CHRONIC SYSTOLIC CONGESTIVE HEART FAILURE (H): ICD-10-CM

## 2025-01-27 RX ORDER — DAPAGLIFLOZIN 10 MG/1
10 TABLET, FILM COATED ORAL DAILY
Qty: 90 TABLET | Refills: 3 | Status: SHIPPED | OUTPATIENT
Start: 2025-01-27

## 2025-01-27 NOTE — TELEPHONE ENCOUNTER
dapagliflozin (FARXIGA) 10 MG TABS tablet 90 tablet 3 2/5/2024     Last Office Visit: 08/26/2024     Future Office visit:       Routing refill request to provider for review/approval because:

## 2025-02-21 ENCOUNTER — TELEPHONE (OUTPATIENT)
Dept: FAMILY MEDICINE | Facility: OTHER | Age: 40
End: 2025-02-21

## 2025-02-21 NOTE — TELEPHONE ENCOUNTER
2:05 PM    Reason for Call: Phone Call    Description: [facility] [facility] called in to report abnormal zio patch results. Please call [employer] back. Reference number: [id number]    Was an appointment offered for this call? No  If yes : Appointment type              Date    Preferred method for responding to this message: Telephone Call  What is your phone number ? [phone]     If we cannot reach you directly, may we leave a detailed response at the number you provided?  Someone will answer the phone until 9 pm central.    Can this message wait until your PCP/provider returns, if available today? Not applicable    Yisel Garcia

## 2025-02-21 NOTE — TELEPHONE ENCOUNTER
"Attempt # 1  Outcome: Left Message   Comment: LVM for pt to call back and schedule a return visit w/Dr. Salinas in Cardiology. Appt: \"Zio patch follow up / abnormal results\"     "

## 2025-02-21 NOTE — TELEPHONE ENCOUNTER
Spoke with company.  High grade AV block lasting for 3 second.  HR between 34-35 beats per min for 3 sec.  9:14 am on the 02/05/25.  Pg 14 strip 5

## 2025-02-21 NOTE — TELEPHONE ENCOUNTER
She needs to follow-up with Dr Salinas.  Scheduled appt is not until 8/27/2025 - please move this appointment up.

## 2025-02-22 DIAGNOSIS — I50.22 CHRONIC SYSTOLIC CONGESTIVE HEART FAILURE (H): ICD-10-CM

## 2025-02-22 DIAGNOSIS — I42.8 NONISCHEMIC CARDIOMYOPATHY (H): ICD-10-CM

## 2025-02-22 DIAGNOSIS — I10 BENIGN ESSENTIAL HYPERTENSION: ICD-10-CM

## 2025-02-22 LAB — CV ZIO PRELIM RESULTS: NORMAL

## 2025-02-24 RX ORDER — SPIRONOLACTONE 25 MG/1
25 TABLET ORAL DAILY
Qty: 90 TABLET | Refills: 3 | Status: SHIPPED | OUTPATIENT
Start: 2025-02-24

## 2025-02-24 NOTE — TELEPHONE ENCOUNTER
spironolactone (ALDACTONE) 25 MG tablet 90 tablet 3 5/22/2024     Last Office Visit: 8/26/2024  Future Office visit:    Next 5 appointments (look out 90 days)      Mar 17, 2025 10:30 AM  (Arrive by 10:15 AM)  Return Visit with Scar Salinas DO  [facility] [place] ([facility] [place] ) [address]  [place] [address]  [phone]             Routing refill request to provider for review/approval because:

## 2025-03-02 ENCOUNTER — HEALTH MAINTENANCE LETTER (OUTPATIENT)
Age: 40
End: 2025-03-02

## 2025-03-17 ENCOUNTER — OFFICE VISIT (OUTPATIENT)
Dept: CARDIOLOGY | Facility: OTHER | Age: 40
End: 2025-03-17
Attending: INTERNAL MEDICINE
Payer: COMMERCIAL

## 2025-03-17 VITALS — RESPIRATION RATE: 16 BRPM | HEIGHT: 67 IN | OXYGEN SATURATION: 98 % | BODY MASS INDEX: 25.96 KG/M2 | WEIGHT: 165.4 LBS

## 2025-03-17 DIAGNOSIS — E03.4 HYPOTHYROIDISM DUE TO ACQUIRED ATROPHY OF THYROID: ICD-10-CM

## 2025-03-17 DIAGNOSIS — R10.11 RIGHT UPPER QUADRANT PAIN: ICD-10-CM

## 2025-03-17 DIAGNOSIS — I42.8 NONISCHEMIC CARDIOMYOPATHY (H): Primary | ICD-10-CM

## 2025-03-17 DIAGNOSIS — R55 SYNCOPE, UNSPECIFIED SYNCOPE TYPE: ICD-10-CM

## 2025-03-17 LAB
ATRIAL RATE - MUSE: 71 BPM
DIASTOLIC BLOOD PRESSURE - MUSE: NORMAL MMHG
ERYTHROCYTE [DISTWIDTH] IN BLOOD BY AUTOMATED COUNT: 12 % (ref 10–15)
HCT VFR BLD AUTO: 48.1 % (ref 35–47)
HGB BLD-MCNC: 16.3 G/DL (ref 11.7–15.7)
INTERPRETATION ECG - MUSE: NORMAL
IRON BINDING CAPACITY (ROCHE): 240 UG/DL (ref 240–430)
IRON SATN MFR SERPL: 48 % (ref 15–46)
IRON SERPL-MCNC: 116 UG/DL (ref 37–145)
MCH RBC QN AUTO: 31.2 PG (ref 26.5–33)
MCHC RBC AUTO-ENTMCNC: 33.9 G/DL (ref 31.5–36.5)
MCV RBC AUTO: 92 FL (ref 78–100)
NT-PROBNP SERPL-MCNC: <36 PG/ML (ref 0–450)
P AXIS - MUSE: 60 DEGREES
PLATELET # BLD AUTO: 187 10E3/UL (ref 150–450)
PR INTERVAL - MUSE: 176 MS
QRS DURATION - MUSE: 94 MS
QT - MUSE: 396 MS
QTC - MUSE: 430 MS
R AXIS - MUSE: 70 DEGREES
RBC # BLD AUTO: 5.22 10E6/UL (ref 3.8–5.2)
SYSTOLIC BLOOD PRESSURE - MUSE: NORMAL MMHG
T AXIS - MUSE: 52 DEGREES
TSH SERPL DL<=0.005 MIU/L-ACNC: 1.42 UIU/ML (ref 0.3–4.2)
VENTRICULAR RATE- MUSE: 71 BPM
WBC # BLD AUTO: 12.7 10E3/UL (ref 4–11)

## 2025-03-17 ASSESSMENT — PAIN SCALES - GENERAL: PAINLEVEL_OUTOF10: MILD PAIN (2)

## 2025-03-17 NOTE — PROGRESS NOTES
[facility] HEART CARE   CARDIOLOGY PROGRESS NOTE     Chief Complaint   Patient presents with    Follow Up     Follow up after zio patch          Diagnosis:  1.  CHF-systolic.   -55-60% on 8/7/2024.   -45-50% on 12/18/2023.   -49% on cardiac MRI on 4/14/2023, [facility].   -55-60% on 12/8/2022.  2.  Frequent PVC.   -<0.1% on 1/2/2025.   -<0.1% on 10/25/2023.   -PVC ablation on 9/25/2023, [facility]-Dr. Stafford.    -PVC burden of 15.6% on 6/30/2023.  3.  History of syncope.  4.  Hypothyroidism.  5.  History of intentional drug overdose.  6.  Tobacco abuse.   -1/2 day for 17 years.  7.  CTA coronaries on 4/3/2024.   -Calcium score of 0 with no appreciable stenosis.      Assessment/Plan:    1.  Near syncope: Has been going on since approximately 2022.  She states she also had episodes in her teen years but describes herself as having an eating disorder and the reason for these symptoms.  Presently, she has 1-2/week.  Often, they occur while standing/orthostatic.  She gives an example of being at [employer] and walking the Mary.  She was in the electronic department when she started to feel foggy/dizzy.  She had to prop herself up against one of the shelves as she felt like she may pass out. She drinks 80 ounces of water a day.  She has not officially passed out for years.  She describes some nausea with these episodes.  She and her  do operate a motel.  Her symptoms are very disrupting to her life.  She had orthostatics today.  Orthostatics were negative.  She has never had orthostatics  Up until today.  The symptoms started well before initiating her heart failure medications which include Entresto, spironolactone, metoprolol, and Farxiga.  She has had x2 monitors.  She is allergic to the tape.  We discussed a 30-day monitor or possibly loop recorder.  She declined these options.  I am suspicious for neurocardiogenic/vasovagal syncope.  She was almost in tears today as it is disrupting to her life.  I think she would benefit  from a tilt table test to fully determine whether or not she is having vasovagal/neurocardiogenic syncope as I think that is high on the differential.  She has been referred to [facility] for this procedure.  Discussed proper fluid consumption which includes salt consumption, isotonic contractions, and compression stockings.  Patient does exercise.  2.  Mobitz type I heart block: Noted at 3.7 seconds on her monitor from 1/2/2025.  Patient cannot remember if she has had these type of symptoms while wearing this monitor.  This is not a triggered event.  This episode occurred on 2/5/2025 at 9:14 AM.  She thinks she was working out.  No infiltrative process on cardiac MRI through [facility] on 4/14/2023.  No concerns on her echo from 8/11/2024.  Planning for Lyme titer as there is possible exposure.  Certainly could consider another cardiac MRI looking for things such as amyloid or sarcoid but patient declined today with normal MRI in April 2023.  I do not believe this is causing her episodes of near syncope.  3.  CHF: Now has normalized.  EF of 55-60% on 8/7/2024.  EF noted to be 51% on stress test from 1/5/2023.  Echocardiogram showed an EF of 45-50% on 12/18/2023.  Cardiac MRI on 4/14/2023 through [facility] showed an EF of 47.7%.  Patient not having significant peripheral edema or swelling.  Works out x5-6 times a week, 30-60 minutes at a time.  Patient stable.  EF normalized as outlined above.  Continue spironolactone 25 mg daily, Farxiga 10 mg daily, metoprolol 50 mg XL daily, and Entresto 49/51 mg twice a day.  Plan for an echocardiogram in 3 months with follow-up thereafter.  Mother has a history of heart failure making me concerned it is genetic. If it heart EF drops, would consider referral to advanced heart failure.  4.  Frequent PVCs: Now [facility]  Palpitations have improved substantially.  Less than 1% noted on her Zio patch from 1/2/2025.  Noted to have  increasing palpitations in 2022.  At times, has almost had near syncopal per patient.  The Zio patch from 10/25/2023 showed a PVC burden of less than 0.1%.  Had ablation of the [facility] on 9/25/2023 with Dr. Stafford.  Has a Apple watch.  Rhythm strips reviewed.  No significant concerns.  No PVCs.  5.  Chest discomfort: Noncardiac.  Describes sharp discomfort which is substernal and left precordial.  Discomfort will last all day long and not affected by activity when she works out.  Not structural.  Denies tender spot and is not made worse with deep breathing.  Does not believe she has acid reflux.  Patient concerned it could be her gallbladder.  Planning for an ultrasound of her abdomen looking in her gallbladder.  I am concerned with stress/anxiety.  Normal CTA of coronaries on 4/3/2024 with a calcium score of 0 with no appreciable disease.  6.  Tobacco abuse: Does have a history of tobacco abuse and encouraged to quit.  7.  Plan for tilt table at [facility].  Orthostatics negative today.  Planning for late labs which includes Lyme titer today.  Ultrasound of her right upper quadrant to look at her gallbladder.      Interval history:  See above.      HPI:    Manju Weiss presents for cardiology consult. She has no significant cardiac history. She has a non-cardiac history including hypothyroidism, pre-eclampsia, anxiety.        Ms. Weiss tells me that over the last year she has had progressively worsening symptoms of fluttering/skipping sensation in her chest as well as lightheaded, near-syncopal symptoms with exertion such as walking around [employer]. She tells me that she could be having a conversation with her  and she gets symptoms of blurred vision, darkening of vision, lightheaded. Symptoms typically improve in a few seconds but lately seems like it is taking longer. She has not experienced syncope. She does workout- she follows [employer] work-out program doing weight lifting  "and HIIT program. She tells me that when she is doing these workouts she does not experience symptoms of chest pain, dyspnea, lightheaded. Symptoms seem most prominent at rest- especially at night she is feeling racing/palpitations \"like there is a ticking time bomb in my chest.\"      She does endorse history of syncope as a teenager. She tells me that as a teenager she would avoid eating for extended periods of time. Did not like eating in front of people. Did not like people to know if or how much she was eating. Very thin. She relates her syncopal episodes to this. As an adult she does not feel she has restrictive eating. No purging after eating.      Has pulmonary function testing scheduled in January 2023. She does do daily Advair.       Relevant testing:  Zio patch in 1/2/2025:  Worn for 13 days and 23 hr's.  After removing artifact, total time was 13 days and 20 hr's. Placed on January 27, 2025 at 10:45 AM and completed on February 10, 2025 at 9:25 AM.  Underlying rhythm was sinus.  Hrt rate ranged from 34 bpm, maximum heart rate of 141 bpm, averaging 71 bpm.  Bradycardia noted down to a heart rate of 34 bpm secondary to high-grade AV block.  1 episode of AV block(high-grade) occurred, lasting 4 seconds, second-degree AV block-Mobitz type I was present.  No atrial fibrillation on this study.  x 16 triggered events and x 0 diary entries.  These corresponded to normal sinus rhythm and VE's.  x 0 runs of VT.    x 0 runs of SVT.  Rare, <1% of PAC's, atrial couplets, atrial triplets, and PVC's.  No episodes of ventricular bigeminy.  + episodes of ventricular trigeminy lasting up to 10.3 sec's.    ECHO on 8/7/24:  Left ventricular function is normal.The ejection fraction is 55-60%.  Global right ventricular function is normal.  No significant valvular abnormalities present.  This study was compared with the study from 12/18/23: No significant changes  noted. Specifically, LVEF is unchanged on direct visual " comparison with the  images from the prior study.      CTA coronaries on 4/3/2024:  Left Main:                            0.0  Left anterior descendin.0  Left Circumflex:                  0.0  Right coronary artery:        0.0  TOTAL:                               0.0    Left main coronary artery: Widely patent  Left anterior descending artery: Widely patent  Left circumflex artery: Widely patent  Right coronary artery: Widely patent    ECHO on 23:  No pericardial effusion is present.  Left ventricular function is decreased. The ejection fraction is 45-50% (mildly reduced).  No regional wall motion abnormalities are seen.  Global right ventricular function is normal.  Mild left atrial enlargement is present.  Trace to mild mitral insufficiency is present.  No aortic stenosis is present.  Trace to mild tricuspid insufficiency is present.  This study was compared with the study from 22 .  LV function has minimally worsened since previous study      Zio patch on 10/25/23:  Worn for 14 days and 0 hr's.  After removing artifact, total time was 13 days and 20 hr's. Placed on 2023 at 11:37 AM and completed on 2023 at 1:37 AM.  Underlying rhythm was sinus.  Hrt rate ranged from 41 bpm, maximum heart rate of 162 bmp, averaging 71 bmp.  No significant bradycardia, pauses, Mobitz type II or 3rd degree heart block.  No atrial fibrillation on this study.  x 30 triggered events and x 2 diary entries.  These corresponded to normal sinus rhythm and SVE's.  x 0 runs of VT.    x 0 runs of SVT.  Rare, <1% of PAC's, atrial couplets, atrial triplets, and PVC's.  No episodes of ventricular bigeminy.  No episodes of ventricular trigeminy.      PVC ablation on 23, [facility]:    Zio patch on 23:  PVC burden on 15.6%.    Cardiac MRI 2023:  1.   Findings consistent with non-ischemic dilated cardiomyopathy.   2.   Mildly dilated left ventricle with mildly reduced systolic function; LVEF =  47.7%.   3.   Mildly dilated right ventricle with mildly reduced RVEF = 49%.   4.   No myocardial infarction or fibrosis.       Stress test on 1/5/23:    The nuclear stress test is abnormal.     The left ventricular ejection fraction at rest is 51%.  The left   ventricular ejection fraction at stress is 58%.     There is no prior study for comparison.   -enlarged left ventricle with global mild hypokinesis.    No reversible ischemia     ECHO on 12/8/22:  Global and regional left ventricular function is normal with an EF of 55-60%.  Mild left ventricular dilation is present.  Right ventricular function, chamber size, wall motion, and thickness are  normal.  Mild to moderate left atrial enlargement is present.  The atrial septum is intact as assessed by color Doppler and agitated saline  bubble study .  Trace to mild mitral insufficiency is present.  Trace tricuspid insufficiency is present.  Trace pulmonic insufficiency is present.      Zio patch on 11/8/22:  Worn for 13 days and 21 hr's.  After removing artifact, total time was 13 days and 19 hr's. Placed on 11/8/22 at 11:31 am and completed on 11/22/22 at 8:38 am.  Underlying rhythm was sinus  Hrt rate ranged from 44 bpm, maximum heart rate of 207 bmp, averaging 77 bmp.  No significant bradycardia, pauses, Mobitz type II or 3rd degree heart block.  No atrial fibrillation on this study.  x61 triggered events and x18 diary entries.  These corresponded to NSR, Ventricular Bigeminy and Trigeminy, SVEs and VEs.  x29 runs of VT longest lasting 11 beats with a maximum heart rate of 207 bmp.    x0 runs of SVT.  Rare, <1% of PAC's, atrial couplets, atrial triplets.  VEs were frequent at 10.9% ([id number]).  Ventricular bigeminy lasting up to 10 mins 33 sec's.  Ventricular trigeminy lasting up to 16 mins 16 sec's.              ICD-10-CM    1. Nonischemic cardiomyopathy (H)  I42.8 EKG 12-lead complete w/read - (Clinic Performed)     Case Request EP: Tilt Table Study     LYME  DISEASE TOTAL ANTIBODIES WITH REFLEX TO CONFIRMATION     BNP-N terminal pro     CBC with platelets     Iron and iron binding capacity     LYME DISEASE TOTAL ANTIBODIES WITH REFLEX TO CONFIRMATION     BNP-N terminal pro     CBC with platelets     Iron and iron binding capacity      2. Syncope, unspecified syncope type  R55 Case Request EP: Tilt Table Study     LYME DISEASE TOTAL ANTIBODIES WITH REFLEX TO CONFIRMATION     BNP-N terminal pro     CBC with platelets     Iron and iron binding capacity     LYME DISEASE TOTAL ANTIBODIES WITH REFLEX TO CONFIRMATION     BNP-N terminal pro     CBC with platelets     Iron and iron binding capacity      3. Right upper quadrant pain  R10.11 US Abdomen Limited      4. Hypothyroidism due to acquired atrophy of thyroid  E03.4 TSH with free T4 reflex                  Past Medical History:   Diagnosis Date    Abnormal Pap smear of cervix 11/11/2011    Formatting of this note might be different from the original. 5/2011 LSIL     (age 25)      [place] path:  CIN1 11/2011  NILM 2014 NILM 2017 NILM      31 y.o. 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 On low dose ASA prophylaxis regimen    Migraine 09/07/2023    Migraines     Nonischemic cardiomyopathy (H) 09/07/2023    Uncomplicated asthma        Past Surgical History:   Procedure Laterality Date    BIOPSY  2011    COLONOSCOPY  2013    EP ABLATION PVC N/A 09/25/2023    Procedure: EP Ablation PVC;  Surgeon: Saw Stafford MD;  Location:  HEART CARDIAC CATH LAB    ORTHOPEDIC SURGERY  2005    TONSILLECTOMY  2001       Allergies   Allergen Reactions    Sulfa Antibiotics Shortness Of Breath    Latex Hives, Itching  and Rash       Current Outpatient Medications   Medication Sig Dispense Refill    albuterol (PROAIR HFA/PROVENTIL HFA/VENTOLIN HFA) 108 (90 Base) MCG/ACT inhaler Inhale 2 puffs into the lungs every 4 hours as needed for shortness of breath / dyspnea or wheezing 18 g 1    APPLE CIDER VINEGAR PO       FARXIGA 10 MG TABS tablet Take 1 tablet by mouth once daily 90 tablet 3    fluticasone-salmeterol (WIXELA INHUB) 100-50 MCG/ACT inhaler Inhale 1 puff into the lungs every 12 hours 60 each 1    levonorgestrel (MIRENA) 52 MG (20 mcg/day) IUD 1 each by Intrauterine route once      levothyroxine (SYNTHROID/LEVOTHROID) 137 MCG tablet Take 1 tablet (137 mcg) by mouth daily. 90 tablet 0    metoprolol succinate ER (TOPROL XL) 50 MG 24 hr tablet TAKE 1 TABLET(50 MG) BY MOUTH DAILY 90 tablet 1    multivitamin w/minerals (THERA-VIT-M) tablet Take 1 tablet by mouth daily      sacubitril-valsartan (ENTRESTO) 49-51 MG per tablet TAKE 1 TABLET BY MOUTH TWICE DAILY 180 tablet 3    spironolactone (ALDACTONE) 25 MG tablet TAKE 1 TABLET(25 MG) BY MOUTH DAILY 90 tablet 3       Social History     Socioeconomic History    Marital status:      Spouse name: Not on file    Number of children: Not on file    Years of education: Not on file    Highest education level: Not on file   Occupational History    Not on file   Tobacco Use    Smoking status: Every Day     Current packs/day: 0.50     Average packs/day: 0.5 packs/day for 17.0 years (8.5 ttl pk-yrs)     Types: Cigarettes    Smokeless tobacco: Never   Vaping Use    Vaping status: Never Used   Substance and Sexual Activity    Alcohol use: Not Currently    Drug use: Never    Sexual activity: Yes     Partners: Male     Birth control/protection: I.U.D.   Other Topics Concern    Parent/sibling w/ CABG, MI or angioplasty before 65F 55M? No   Social History Narrative    Not on file     Social Drivers of Health     Financial Resource Strain: Low Risk  (10/23/2023)    Financial Resource Strain      Within the past 12 months, have you or your family members you live with been unable to get utilities (heat, electricity) when it was really needed?: No   Food Insecurity: Low Risk  (10/23/2023)    Food Insecurity     Within the past 12 months, did you worry that your food would run out before you got money to buy more?: No     Within the past 12 months, did the food you bought just not last and you didn t have money to get more?: No   Transportation Needs: Low Risk  (10/23/2023)    Transportation Needs     Within the past 12 months, has lack of transportation kept you from medical appointments, getting your medicines, non-medical meetings or appointments, work, or from getting things that you need?: No   Physical Activity: Not on file   Stress: Not on file   Social Connections: Not on file   Interpersonal Safety: Low Risk  (1/2/2025)    Interpersonal Safety     Do you feel physically and emotionally safe where you currently live?: Yes     Within the past 12 months, have you been hit, slapped, kicked or otherwise physically hurt by someone?: No     Within the past 12 months, have you been humiliated or emotionally abused in other ways by your partner or ex-partner?: No   Housing Stability: Low Risk  (10/23/2023)    Housing Stability     Do you have housing? : Yes     Are you worried about losing your housing?: No       LAB RESULTS:   Office Visit on 01/09/2024   Component Date Value Ref Range Status    Ventricular Rate 01/09/2024 59  BPM Final    Atrial Rate 01/09/2024 59  BPM Final    TN Interval 01/09/2024 174  ms Final    QRS Duration 01/09/2024 96  ms Final    QT 01/09/2024 436  ms Final    QTc 01/09/2024 431  ms Final    P Axis 01/09/2024 39  degrees Final    R AXIS 01/09/2024 57  degrees Final    T Axis 01/09/2024 39  degrees Final    Interpretation ECG 01/09/2024    Final                    Value:Sinus bradycardia  Minimal voltage criteria for LVH, may be normal variant ( Sokolow-Quijano )  When compared  "with ECG of 25-SEP-2023 19:02,  Nonspecific T wave abnormality no longer evident in Lateral leads  Confirmed by MD SALINAS DANIEL ([id number]) on 1/9/2024 4:30:36 PM          Review of systems: Negative except that which was noted in the HPI.    Physical examination:  Resp 16   Ht 1.689 m (5' 6.5\")   Wt 75 kg (165 lb 6.4 oz)   SpO2 98%   BMI 26.30 kg/m      GENERAL APPEARANCE: healthy, alert and no distress  CHEST: lungs clear to auscultation.  CARDIOVASCULAR: regular rhythm, normal S1 with physiologic split S2, no S3 or S4 and no murmur, click or rub  EXTREMITIES: no clubbing, cyanosis or edema.    Total time spent on day of visit, including review of tests, obtaining/reviewing separately obtained history, ordering medications/tests/procedures, communicating with PCP/consultants, and documenting in electronic medical record: 60 minutes.                Thank you for allowing me to participate in the care of your patient. Please do not hesitate to contact me if you have any questions.     Scar Salinas, DO          "

## 2025-03-17 NOTE — PATIENT INSTRUCTIONS
Thank you for allowing Dr VIDHI Salinas and our  team to participate in your care. Please call our office at [phone] with scheduling questions or if you need to cancel or change your appointment. With any other questions or concerns you may call cardiology nurse at  [phone].       If you experience chest pain, chest pressure, chest tightness, shortness of breath, fainting, lightheadedness, nausea, vomiting, or other concerning symptoms, please report to the Emergency Department or call 911. These symptoms may be emergent, and best treated in the Emergency Department.

## 2025-03-19 ENCOUNTER — HOSPITAL ENCOUNTER (OUTPATIENT)
Dept: ULTRASOUND IMAGING | Facility: HOSPITAL | Age: 40
Discharge: HOME | End: 2025-03-19
Attending: INTERNAL MEDICINE
Payer: COMMERCIAL

## 2025-03-19 ENCOUNTER — TELEPHONE (OUTPATIENT)
Dept: CARDIOLOGY | Facility: CLINIC | Age: 40
End: 2025-03-19
Payer: COMMERCIAL

## 2025-03-19 DIAGNOSIS — R10.11 RIGHT UPPER QUADRANT PAIN: ICD-10-CM

## 2025-03-19 LAB — B BURGDOR IGG+IGM SER QL: 0.04

## 2025-03-19 PROCEDURE — 76705 ECHO EXAM OF ABDOMEN: CPT

## 2025-03-19 NOTE — TELEPHONE ENCOUNTER
----- Message from Jaxson WEST sent at 3/18/2025  9:15 AM CDT -----  Regarding: Randy Nunes said he is ok with having a tilt done for this patient.      Chintan Snider, RN BSN  Cardiology Nurse Coordinator  ----- Message -----  From: Genesis Hammonds  Sent: 3/17/2025   3:26 PM CDT  To: Marlo Mayer CMA; Meli Aggarwal, RN; #    Hi Meli,   I've added Chintan. Typically, Dr. Nunes makes the patient do a visit with Dr. Nunes or Dr. Lilly before they're able to book a tilt table.   I've added Marlo (she does clinic appointments)  ----- Message -----  From: Meli Aggarwal, RN  Sent: 3/17/2025  11:50 AM CDT  To: Genesis Hammonds    I've never had either provider order a tilt table study until today.  Lol  Do we just give them the number listed and have them call you?      Hayde

## 2025-03-19 NOTE — TELEPHONE ENCOUNTER
EP Scheduling called the patient to schedule Tilt Table Study with Dr. Nunes. The number [phone] was left for the patient to return the call and schedule the procedure.    Gwen Hammonds  Periop Electrophysiology   [phone]

## 2025-04-09 DIAGNOSIS — E03.4 HYPOTHYROIDISM DUE TO ACQUIRED ATROPHY OF THYROID: ICD-10-CM

## 2025-04-09 RX ORDER — LEVOTHYROXINE SODIUM 137 UG/1
137 TABLET ORAL DAILY
Qty: 90 TABLET | Refills: 2 | Status: SHIPPED | OUTPATIENT
Start: 2025-04-09

## 2025-06-09 ENCOUNTER — HOSPITAL ENCOUNTER (OUTPATIENT)
Facility: CLINIC | Age: 40
Discharge: HOME | End: 2025-06-09
Attending: INTERNAL MEDICINE | Admitting: INTERNAL MEDICINE
Payer: COMMERCIAL

## 2025-06-09 VITALS
HEIGHT: 67 IN | TEMPERATURE: 98.1 F | DIASTOLIC BLOOD PRESSURE: 65 MMHG | RESPIRATION RATE: 20 BRPM | SYSTOLIC BLOOD PRESSURE: 113 MMHG | WEIGHT: 160.3 LBS | BODY MASS INDEX: 25.16 KG/M2 | HEART RATE: 65 BPM | OXYGEN SATURATION: 98 %

## 2025-06-09 DIAGNOSIS — R55 SYNCOPE, UNSPECIFIED SYNCOPE TYPE: ICD-10-CM

## 2025-06-09 DIAGNOSIS — I42.8 NONISCHEMIC CARDIOMYOPATHY (H): ICD-10-CM

## 2025-06-09 PROCEDURE — 258N000003 HC RX IP 258 OP 636: Performed by: INTERNAL MEDICINE

## 2025-06-09 PROCEDURE — 93660 TILT TABLE EVALUATION: CPT | Mod: 26 | Performed by: INTERNAL MEDICINE

## 2025-06-09 PROCEDURE — 95921 AUTONOMIC NRV PARASYM INERVJ: CPT | Mod: 26 | Performed by: INTERNAL MEDICINE

## 2025-06-09 PROCEDURE — 272N000001 HC OR GENERAL SUPPLY STERILE: Performed by: INTERNAL MEDICINE

## 2025-06-09 PROCEDURE — 95921 AUTONOMIC NRV PARASYM INERVJ: CPT | Performed by: INTERNAL MEDICINE

## 2025-06-09 PROCEDURE — 93660 TILT TABLE EVALUATION: CPT | Performed by: INTERNAL MEDICINE

## 2025-06-09 RX ORDER — LORATADINE 10 MG/1
10 TABLET ORAL DAILY
COMMUNITY

## 2025-06-09 RX ORDER — SODIUM CHLORIDE 9 MG/ML
INJECTION, SOLUTION INTRAVENOUS CONTINUOUS
Status: DISCONTINUED | OUTPATIENT
Start: 2025-06-09 | End: 2025-06-09 | Stop reason: HOSPADM

## 2025-06-09 RX ORDER — LIDOCAINE 40 MG/G
CREAM TOPICAL
Status: DISCONTINUED | OUTPATIENT
Start: 2025-06-09 | End: 2025-06-09 | Stop reason: HOSPADM

## 2025-06-09 RX ADMIN — SODIUM CHLORIDE 400 ML: 0.9 INJECTION, SOLUTION INTRAVENOUS at 09:40

## 2025-06-09 ASSESSMENT — ACTIVITIES OF DAILY LIVING (ADL)
ADLS_ACUITY_SCORE: 41

## 2025-06-09 NOTE — PROCEDURES
EP PROCEDURE NOTE    *Procedures:*    1. TILT table test.  2. Autonomic function test.     *Cardiologist:*  Raman Nunes    *EP Fellow:*  N/A    *Referring MD: *  Dr Scar Salinas ([facility])    *Pre-operative Diagnosis:*  Syncope.    *Complications:*  None.     *Medications:*  NTG 0.4mg SL/None.     *Clinical Profile:*  Manju is a 39-year-old woman referred by Dr. Salinas for evaluation of dizziness/lightheadedness and near syncope.  Symptoms seem to be postural in nature and aggravated by extended periods of being upright, and have apparently been present since that approximately 2022.  During her evaluation she was found to have frequent PVCs and underwent PVC ablation.  Her ejection fraction was as low as 45 to 50% in 2023 and post ablation rebounded to current value of 55/60%.  Nonetheless she continues to be treated with spironolactone, metoprolol, and Entresto.  These medications may exacerbate an underlying predisposition to orthostatic hypotension    Dr Salinas ([facility]) Clinic Note:  Near syncope: Has been going on since approximately 2022. She states she also had episodes in her teen years but describes herself as having an eating disorder and the reason for these symptoms. Presently, she has 1-2/week. Often, they occur while standing/orthostatic. She gives an example of being at [employer] and walking the [place]. She was in the electronic department when she started to feel foggy/dizzy. She had to prop herself up against one of the shelves as she felt like she may pass out. She drinks 80 ounces of water a day. She has not officially passed out for years. She describes some nausea with these episodes. She and her  do operate a motel. Her symptoms are very disrupting to her life. She had orthostatics today. Orthostatics were negative. She has never had orthostatics Up until today. The symptoms started well before initiating her heart failure medications which include Entresto, spironolactone,  metoprolol, and Farxiga.       The patient is here for autonomic testing including tilt testing.     Please see Dr Salinas clinic visit note  for details.      PROCEDURE    The risks and benefits of the procedure were explained to the patient in   full. Written informed consent was obtained. The patient was brought to the   EP lab in a fasting and hemodynamically stable condition. Physical   examination of the patient did not reveal any carotid bruits, and review of   the chart did not reveal the presence of stroke or TIA within the past   three months.     The following maneuvers were done sequentially:    1- Sitting for 5 minutes:     End of 5 min:  HR 55, /54    2- Standing for 10 minutes:   Baseline HR 65 /70  Immediate Rosy HR 86   /34     time from standing to rosy 8 seconds      time from rosy to recovery 14 seconds  30 sec: HR N/A, BP N/A  1 min: HR 60, /63  2 min: HR 61, /90  3 min: HR 65, /71  4 min: HR 61, /66  5 min: HR 68, /72  6 min: HR 69, /72  Minor symptoms study terminated    2- Maneuvers in seated position:    A. Carotid sinus massage:  Not done due to age and gender    B. Valsalva:   Baseline: HR 56, /70  Phase 1: HR 56 , Max /72  Phase 2: , Min BP 89/57  Phase 3: Present/Absent  Phase 4 (Overshoot): HR 43, Max /82    Valsalva ratio 114/42= 2.65 (normal)    Symptoms: None    C.  Respiratory sinus arrhythmia  in 55 out 48 delta 7,  in 56/47 delta 9  im 56/50 delta 6  In 56 out 49 delta 7    Average delta= 7 (within normal limits)    D Shrug test  Baseline HR 74 /62  Rosy HR 74 BP 92/52--time to rosy 5 seconds--time to recovery 20 seconds  3- Supine rest for 5 minutes:     4- TILT at 70 degrees for 20 minutes: (1 liter fluid was given before tilt)  Baseline HR 64 /53  Immediate Rosy HR 76   BP 98/46     time from standing to rosy 50 seconds      time from rosy to recovery 68 seconds  1 min: HR 70, BP  121/58  2 min: HR 64, /55  3 min: HR 67, /56  4 min: HR 66, /58  5 min: HR 66, /54  6 min: HR 70, /54  7 min: HR 72, /60  8 min: HR 73, /56  9 min: HR 82, /51  10 min: HR 59, /65  12 min: HR 76, /54  14 min: HR 74, /58  16 min: HR 70, /54  18 min: HR 68, /61  20 min: HR 71, /56  No significant symptoms study terminated    5-   Meds administered    DISCUSSION:  Autonomic testing was essentially normal.  Patient did not show limited heart rate excursion with upright posture both during active standing and tilt.  Beta-blocker treatment may have played a role.  Patient also exhibited substantial immediate orthostatic hypotension with active standing (blood pressure fell from 142/78- to 101/34) but on this occasion she did not exhibit particularly severe symptoms.    In the absence of her current  HF drug therapy, treatment with increased salt and electrolyte in the diet along with potentially fludrocortisone would be considered.  However certain of her symptom related to blood pressure changes could already be exacerbated by her combination of beta-blocker, Entresto and spironolactone.  That would seem to be the first place to start although it is understood that the patient did have orthostatic symptoms prior to this drug therapy.  Potentially diminishing her heart failure medications (ejection fraction is now returned to normal) and using constrictive undergarments may provide the adequate blood pressure support when she is upright.    Findings were discussed with patient and  on station 2A after the procedure.    Dr Nunes was present throughout the entire procedure.      I appreciated the opportunity to see and assess Mrs Weiss and direct the procedure described above with EP Fellow. The above note summarizes my findings and current recommendations. Please do not hesitate to contact me if you have any questions or  concerns.    Raman Nunes MD [facility]   Pager [phone]  Office [phone]

## 2025-06-09 NOTE — PROGRESS NOTES
at bedside to discuss findings with patient  Discharge paperwork given   Procedure report will be sent to her cardiologist and follow up with  to be arranged   escorted patient to [facility]

## 2025-06-09 NOTE — DISCHARGE INSTRUCTIONS
[facility]  DISCHARGE INSTRUCTIONS FOR   TILT TABLE       Date:6/9/2025    Plan:    - Increase hydration with goal to take in 64 oz of water/electrolyte fluids per day.     Recommend drinking 8-10 oz. Try to avoid high carbohydrate electrolyte drinks.  - Eat six small meals per day with focus on foods low in carbohydrates and low in gluten.  - Liberalize salt intake.  - Change positions carefully and slowly and maintain safe environment.  - Standing training and supine isometric exercises      Dr. Nunes will put recommendations for medication changes.      Cardiovascular Clinic:  [address] [place] [address], [id number]    Your Care Team:  EP Cardiology   Telephone Number     Nurses:   Pamela GARSIA [phone]    After business hours: [phone], select option 4, and ask for EP Fellow on-call to be paged.   Non-procedure scheduling:  Reina CHAUDHARY   [phone]   Procedure scheduling:    Gwen Hammonds   [phone]   Device Clinic (Pacemakers, ICDs, Loop Recorders)    During business hours: [phone]  After business hours:   [phone]- select option 4 and ask for job code [id number].       As always, Thank you for trusting us with your health care needs

## 2025-06-09 NOTE — Clinical Note
Duration of tilt (with no medication): 15 min.    Lowest BP: 111/54.    Lowest HR: 64.    Symptoms/rhythms produced: none.    Nitro was not given during the test.    Test was stopped due to end of test duration.

## 2025-06-09 NOTE — PROGRESS NOTES
Manju arrived on 2A for tilt table   at beside  PIV placed and NS infusing bolus  Preprocedure questions complete  Awaiting consent

## 2025-06-09 NOTE — PROGRESS NOTES
S/p Tilt table. Pt denies dizziness. VSS. Drinking water, declined food. PIV removed. Pt getting dressed and waiting for Dr Nunes.

## 2025-07-01 NOTE — PROGRESS NOTES
"  Assessment & Plan     1. Benign essential hypertension (Primary)  Well controlled  Follow-up with cardiology as scheduled.   - Lipid Profile; Future  - Comprehensive metabolic panel; Future  - CBC with Platelets & Differential; Future    2. Chronic systolic congestive heart failure (H)  Cardiology notes reviewed    3. Nonischemic cardiomyopathy (H)  As above    4. Hypothyroidism due to acquired atrophy of thyroid  Continue levothyroxine.   - TSH with free T4 reflex; Future    5. Mass of left side of neck  Suspect lymph node.   - US Head Neck Soft Tissue; Future    6. Tension headache  Ibuprofen 800mg at onset.     7. Migraine with aura and without status migrainosus, not intractable  - rizatriptan (MAXALT-MLT) 10 MG ODT; Take 1 tablet (10 mg) by mouth at onset of headache for migraine. May repeat in 2 hours. Max 3 tablets/24 hours.  Dispense: 20 tablet; Refill: 2      BMI  Estimated body mass index is 25.28 kg/m  as calculated from the following:    Height as of this encounter: 1.689 m (5' 6.5\").    Weight as of this encounter: 72.1 kg (159 lb).         Follow-up   Return in about 6 months (around 1/7/2026) for heart failure, hypertension and lipids, thyroid.    The longitudinal plan of care for the diagnosis(es)/condition(s) as documented were addressed during this visit. Due to the added complexity in care, I will continue to support Manju in the subsequent management and with ongoing continuity of care.    Zohreh Aguila,   Certified Adult Nurse Practitioner  [phone]     Subjective   Manju is a 39 year old, presenting for the following health issues:  Hypertension, Heart Failure, Headache, and Thyroid Problem        7/7/2025    10:25 AM   Additional Questions   Roomed by sandy   Accompanied by children     HPI      Hypertension Follow-up    Do you check your blood pressure regularly outside of the clinic? Yes   Are you following a low salt diet? Yes  Are your blood pressures ever more than 140 on " the top number (systolic) OR more   than 90 on the bottom number (diastolic), for example 140/90? No    BP Readings from Last 3 Encounters:   07/07/25 110/58   06/09/25 113/65   01/02/25 112/72    Wt Readings from Last 3 Encounters:   07/07/25 72.1 kg (159 lb)   06/09/25 72.7 kg (160 lb 4.8 oz)   03/17/25 75 kg (165 lb 6.4 oz)             Hypothyroidism Follow-up    Since last visit, patient describes the following symptoms: Weight stable, no hair loss, no skin changes, no constipation, no loose stools      Heart Failure Follow-up   Are you experiencing any shortness of breath? No  Are you experiencing any swelling in your legs or feet?  No  Are you using more pillows than usual? No  Do you cough at night?  Yes  Do you check your weight daily?  Yes  Have you had a weight change recently?  No  Are you having any of the following side effects from your medications? (Select all that apply)  The patient does not report symptoms of dizziness, fatigue, cough, swelling, or slow heart beat.  Since your last visit, how many times have you gone to the cardiologist, urgent care, emergency room, or hospital because of your heart failure?   1 time  Last Echo:   Echo result w/o MOPS: Interpretation SummaryLeft ventricular function is normal.The ejection fraction is 55-60%.Global right ventricular function is normal.No significant valvular abnormalities present.This study was compared with the study from 12/18/23: No significant changesnoted. Specifically, LVEF is unchanged on direct visual comparison with theimages from the prior study.  Had tilt table test, negative.  Has follow-up with cardiology in August.  Lightheadedness continues, palpitations are improved since the ablation.      Migraine   Since your last clinic visit, how have your headaches changed?  Worsened  How often are you getting headaches or migraines? Weekly/biweekly    Are you able to do normal daily activities when you have a migraine? Yes  Are you taking  "rescue/relief medications? (Select all that apply) ibuprofen (Advil, Motrin), naproxyn (Aleve), Tylenol, and Excedrin  How helpful is your rescue/relief medication?  I get no relief  Are you taking any medications to prevent migraines? (Select all that apply)  No  In the past 4 weeks, how often have you gone to urgent care or the emergency room because of your headaches?  0        Review of Systems  Constitutional, HEENT, cardiovascular, pulmonary, GI, , musculoskeletal, neuro, skin, endocrine and psych systems are negative, except as otherwise noted.      Objective    /58 (BP Location: Left arm, Patient Position: Chair, Cuff Size: Adult Regular)   Pulse 66   Temp 98.2  F (36.8  C) (Tympanic)   Resp 16   Ht 1.689 m (5' 6.5\")   Wt 72.1 kg (159 lb)   SpO2 94%   BMI 25.28 kg/m    Body mass index is 25.28 kg/m .  Physical Exam   GENERAL: alert and no distress  NECK: no adenopathy, no asymmetry, masses, or scars, thyroid normal to palpation, and no carotid bruits  RESP: lungs clear to auscultation - no rales, rhonchi or wheezes  CV: regular rate and rhythm, normal S1 S2, no S3 or S4, no murmur  MS: no gross musculoskeletal defects noted, no edema  PSYCH: mentation appears normal, affect normal/bright            Signed Electronically by: Zohreh Aguila NP    "

## 2025-07-07 ENCOUNTER — OFFICE VISIT (OUTPATIENT)
Dept: FAMILY MEDICINE | Facility: OTHER | Age: 40
End: 2025-07-07
Attending: NURSE PRACTITIONER
Payer: COMMERCIAL

## 2025-07-07 VITALS
RESPIRATION RATE: 16 BRPM | BODY MASS INDEX: 24.96 KG/M2 | HEIGHT: 67 IN | WEIGHT: 159 LBS | OXYGEN SATURATION: 94 % | DIASTOLIC BLOOD PRESSURE: 58 MMHG | HEART RATE: 66 BPM | SYSTOLIC BLOOD PRESSURE: 110 MMHG | TEMPERATURE: 98.2 F

## 2025-07-07 DIAGNOSIS — I42.8 NONISCHEMIC CARDIOMYOPATHY (H): ICD-10-CM

## 2025-07-07 DIAGNOSIS — G43.109 MIGRAINE WITH AURA AND WITHOUT STATUS MIGRAINOSUS, NOT INTRACTABLE: ICD-10-CM

## 2025-07-07 DIAGNOSIS — E03.4 HYPOTHYROIDISM DUE TO ACQUIRED ATROPHY OF THYROID: ICD-10-CM

## 2025-07-07 DIAGNOSIS — I50.22 CHRONIC SYSTOLIC CONGESTIVE HEART FAILURE (H): ICD-10-CM

## 2025-07-07 DIAGNOSIS — I10 BENIGN ESSENTIAL HYPERTENSION: Primary | ICD-10-CM

## 2025-07-07 DIAGNOSIS — G44.209 TENSION HEADACHE: ICD-10-CM

## 2025-07-07 DIAGNOSIS — R22.1 MASS OF LEFT SIDE OF NECK: ICD-10-CM

## 2025-07-07 LAB
ALBUMIN SERPL BCG-MCNC: 4.6 G/DL (ref 3.5–5.2)
ALP SERPL-CCNC: 83 U/L (ref 40–150)
ALT SERPL W P-5'-P-CCNC: 24 U/L (ref 0–50)
ANION GAP SERPL CALCULATED.3IONS-SCNC: 14 MMOL/L (ref 7–15)
AST SERPL W P-5'-P-CCNC: 25 U/L (ref 0–45)
BASOPHILS # BLD AUTO: 0.1 10E3/UL (ref 0–0.2)
BASOPHILS NFR BLD AUTO: 1 %
BILIRUB SERPL-MCNC: 0.4 MG/DL
BUN SERPL-MCNC: 10.8 MG/DL (ref 6–20)
CALCIUM SERPL-MCNC: 9.4 MG/DL (ref 8.8–10.4)
CHLORIDE SERPL-SCNC: 103 MMOL/L (ref 98–107)
CHOLEST SERPL-MCNC: 139 MG/DL
CREAT SERPL-MCNC: 0.63 MG/DL (ref 0.51–0.95)
EGFRCR SERPLBLD CKD-EPI 2021: >90 ML/MIN/1.73M2
EOSINOPHIL # BLD AUTO: 0.2 10E3/UL (ref 0–0.7)
EOSINOPHIL NFR BLD AUTO: 2 %
ERYTHROCYTE [DISTWIDTH] IN BLOOD BY AUTOMATED COUNT: 11.9 % (ref 10–15)
FASTING STATUS PATIENT QL REPORTED: NO
FASTING STATUS PATIENT QL REPORTED: NO
GLUCOSE SERPL-MCNC: 102 MG/DL (ref 70–99)
HCO3 SERPL-SCNC: 21 MMOL/L (ref 22–29)
HCT VFR BLD AUTO: 46.9 % (ref 35–47)
HDLC SERPL-MCNC: 38 MG/DL
HGB BLD-MCNC: 16.5 G/DL (ref 11.7–15.7)
IMM GRANULOCYTES # BLD: 0 10E3/UL
IMM GRANULOCYTES NFR BLD: 0 %
LDLC SERPL CALC-MCNC: 70 MG/DL
LYMPHOCYTES # BLD AUTO: 2 10E3/UL (ref 0.8–5.3)
LYMPHOCYTES NFR BLD AUTO: 26 %
MCH RBC QN AUTO: 32 PG (ref 26.5–33)
MCHC RBC AUTO-ENTMCNC: 35.2 G/DL (ref 31.5–36.5)
MCV RBC AUTO: 91 FL (ref 78–100)
MONOCYTES # BLD AUTO: 0.7 10E3/UL (ref 0–1.3)
MONOCYTES NFR BLD AUTO: 9 %
NEUTROPHILS # BLD AUTO: 4.9 10E3/UL (ref 1.6–8.3)
NEUTROPHILS NFR BLD AUTO: 63 %
NONHDLC SERPL-MCNC: 101 MG/DL
PLATELET # BLD AUTO: 195 10E3/UL (ref 150–450)
POTASSIUM SERPL-SCNC: 4.1 MMOL/L (ref 3.4–5.3)
PROT SERPL-MCNC: 6.9 G/DL (ref 6.4–8.3)
RBC # BLD AUTO: 5.16 10E6/UL (ref 3.8–5.2)
SODIUM SERPL-SCNC: 138 MMOL/L (ref 135–145)
TRIGL SERPL-MCNC: 156 MG/DL
TSH SERPL DL<=0.005 MIU/L-ACNC: 2.12 UIU/ML (ref 0.3–4.2)
WBC # BLD AUTO: 7.9 10E3/UL (ref 4–11)

## 2025-07-07 RX ORDER — RIZATRIPTAN BENZOATE 10 MG/1
10 TABLET, ORALLY DISINTEGRATING ORAL
Qty: 20 TABLET | Refills: 2 | Status: SHIPPED | OUTPATIENT
Start: 2025-07-07

## 2025-07-07 ASSESSMENT — PAIN SCALES - GENERAL: PAINLEVEL_OUTOF10: NO PAIN (0)

## 2025-07-07 NOTE — PATIENT INSTRUCTIONS
"  Assessment & Plan     1. Benign essential hypertension (Primary)  Well controlled  Follow-up with cardiology as scheduled.   - Lipid Profile; Future  - Comprehensive metabolic panel; Future  - CBC with Platelets & Differential; Future    2. Chronic systolic congestive heart failure (H)  Cardiology notes reviewed    3. Nonischemic cardiomyopathy (H)  As above    4. Hypothyroidism due to acquired atrophy of thyroid  Continue levothyroxine.   - TSH with free T4 reflex; Future    5. Mass of left side of neck  Suspect lymph node.   - US Head Neck Soft Tissue; Future    6. Tension headache  Ibuprofen 800mg at onset.     7. Migraine with aura and without status migrainosus, not intractable  - rizatriptan (MAXALT-MLT) 10 MG ODT; Take 1 tablet (10 mg) by mouth at onset of headache for migraine. May repeat in 2 hours. Max 3 tablets/24 hours.  Dispense: 20 tablet; Refill: 2      BMI  Estimated body mass index is 25.28 kg/m  as calculated from the following:    Height as of this encounter: 1.689 m (5' 6.5\").    Weight as of this encounter: 72.1 kg (159 lb).         Follow-up   Return in about 6 months (around 1/7/2026) for heart failure, hypertension and lipids, thyroid.    Zohreh Aguila,   Certified Adult Nurse Practitioner  [phone]   "

## 2025-07-08 DIAGNOSIS — I42.8 NONISCHEMIC CARDIOMYOPATHY (H): ICD-10-CM

## 2025-07-08 DIAGNOSIS — I10 BENIGN ESSENTIAL HYPERTENSION: ICD-10-CM

## 2025-07-08 DIAGNOSIS — I50.22 CHRONIC SYSTOLIC CONGESTIVE HEART FAILURE (H): ICD-10-CM

## 2025-07-08 RX ORDER — SACUBITRIL AND VALSARTAN 49; 51 MG/1; MG/1
1 TABLET, FILM COATED ORAL 2 TIMES DAILY
Qty: 180 TABLET | Refills: 3 | Status: SHIPPED | OUTPATIENT
Start: 2025-07-08

## 2025-07-11 ENCOUNTER — HOSPITAL ENCOUNTER (OUTPATIENT)
Dept: ULTRASOUND IMAGING | Facility: HOSPITAL | Age: 40
Discharge: HOME | End: 2025-07-11
Attending: NURSE PRACTITIONER | Admitting: NURSE PRACTITIONER
Payer: COMMERCIAL

## 2025-07-11 DIAGNOSIS — R22.1 MASS OF LEFT SIDE OF NECK: ICD-10-CM

## 2025-07-11 PROCEDURE — 76536 US EXAM OF HEAD AND NECK: CPT | Mod: 26 | Performed by: STUDENT IN AN ORGANIZED HEALTH CARE EDUCATION/TRAINING PROGRAM

## 2025-07-11 PROCEDURE — 76536 US EXAM OF HEAD AND NECK: CPT

## 2025-07-16 ENCOUNTER — LAB (OUTPATIENT)
Dept: LAB | Facility: OTHER | Age: 40
End: 2025-07-16
Payer: COMMERCIAL

## 2025-07-16 DIAGNOSIS — D58.2 ABNORMAL HEMOGLOBIN: ICD-10-CM

## 2025-07-16 LAB
BASOPHILS # BLD AUTO: 0.1 10E3/UL (ref 0–0.2)
BASOPHILS NFR BLD AUTO: 1 %
EOSINOPHIL # BLD AUTO: 0.2 10E3/UL (ref 0–0.7)
EOSINOPHIL NFR BLD AUTO: 2 %
ERYTHROCYTE [DISTWIDTH] IN BLOOD BY AUTOMATED COUNT: 11.9 % (ref 10–15)
FERRITIN SERPL-MCNC: 278 NG/ML (ref 6–175)
HCT VFR BLD AUTO: 46 % (ref 35–47)
HGB BLD-MCNC: 16.1 G/DL (ref 11.7–15.7)
IMM GRANULOCYTES # BLD: 0 10E3/UL
IMM GRANULOCYTES NFR BLD: 0 %
IRON BINDING CAPACITY (ROCHE): 251 UG/DL (ref 240–430)
IRON SATN MFR SERPL: 35 % (ref 15–46)
IRON SERPL-MCNC: 89 UG/DL (ref 37–145)
LYMPHOCYTES # BLD AUTO: 2.6 10E3/UL (ref 0.8–5.3)
LYMPHOCYTES NFR BLD AUTO: 26 %
MCH RBC QN AUTO: 31.7 PG (ref 26.5–33)
MCHC RBC AUTO-ENTMCNC: 35 G/DL (ref 31.5–36.5)
MCV RBC AUTO: 91 FL (ref 78–100)
MONOCYTES # BLD AUTO: 0.8 10E3/UL (ref 0–1.3)
MONOCYTES NFR BLD AUTO: 8 %
NEUTROPHILS # BLD AUTO: 6.5 10E3/UL (ref 1.6–8.3)
NEUTROPHILS NFR BLD AUTO: 65 %
PLATELET # BLD AUTO: 187 10E3/UL (ref 150–450)
RBC # BLD AUTO: 5.08 10E6/UL (ref 3.8–5.2)
RETICS # AUTO: 0.08 10E6/UL (ref 0.03–0.1)
RETICS/RBC NFR AUTO: 1.6 % (ref 0.5–2)
WBC # BLD AUTO: 10.1 10E3/UL (ref 4–11)

## 2025-07-16 PROCEDURE — 83540 ASSAY OF IRON: CPT

## 2025-07-16 PROCEDURE — 36415 COLL VENOUS BLD VENIPUNCTURE: CPT

## 2025-07-16 PROCEDURE — 82728 ASSAY OF FERRITIN: CPT

## 2025-07-16 PROCEDURE — 85060 BLOOD SMEAR INTERPRETATION: CPT | Performed by: PATHOLOGY

## 2025-07-16 PROCEDURE — 82746 ASSAY OF FOLIC ACID SERUM: CPT

## 2025-07-16 PROCEDURE — 85025 COMPLETE CBC W/AUTO DIFF WBC: CPT

## 2025-07-16 PROCEDURE — 82607 VITAMIN B-12: CPT

## 2025-07-16 PROCEDURE — 85045 AUTOMATED RETICULOCYTE COUNT: CPT

## 2025-07-16 PROCEDURE — 83550 IRON BINDING TEST: CPT

## 2025-07-17 LAB
FOLATE SERPL-MCNC: 23.7 NG/ML (ref 4.6–34.8)
VIT B12 SERPL-MCNC: 834 PG/ML (ref 232–1245)

## 2025-07-18 ENCOUNTER — RESULTS FOLLOW-UP (OUTPATIENT)
Dept: FAMILY MEDICINE | Facility: OTHER | Age: 40
End: 2025-07-18

## 2025-07-18 DIAGNOSIS — D75.1 POLYCYTHEMIA: Primary | ICD-10-CM

## 2025-07-18 LAB
PATH REPORT.COMMENTS IMP SPEC: NORMAL
PATH REPORT.FINAL DX SPEC: NORMAL
PATH REPORT.MICROSCOPIC SPEC OTHER STN: NORMAL
PATH REPORT.MICROSCOPIC SPEC OTHER STN: NORMAL

## 2025-07-25 ENCOUNTER — HOSPITAL ENCOUNTER (OUTPATIENT)
Dept: CT IMAGING | Facility: HOSPITAL | Age: 40
Discharge: HOME | End: 2025-07-25
Attending: NURSE PRACTITIONER | Admitting: RADIOLOGY
Payer: COMMERCIAL

## 2025-07-25 DIAGNOSIS — R22.1 MASS OF LEFT SIDE OF NECK: ICD-10-CM

## 2025-07-25 PROCEDURE — 70491 CT SOFT TISSUE NECK W/DYE: CPT

## 2025-07-25 PROCEDURE — 70491 CT SOFT TISSUE NECK W/DYE: CPT | Mod: 26 | Performed by: RADIOLOGY

## 2025-07-25 PROCEDURE — 250N000011 HC RX IP 250 OP 636: Performed by: RADIOLOGY

## 2025-07-25 RX ORDER — IOPAMIDOL 755 MG/ML
75 INJECTION, SOLUTION INTRAVASCULAR ONCE
Status: COMPLETED | OUTPATIENT
Start: 2025-07-25 | End: 2025-07-25

## 2025-07-25 RX ADMIN — IOPAMIDOL 75 ML: 755 INJECTION, SOLUTION INTRAVENOUS at 09:57

## 2025-08-20 ENCOUNTER — HOSPITAL ENCOUNTER (OUTPATIENT)
Dept: CARDIOLOGY | Facility: HOSPITAL | Age: 40
Discharge: HOME | End: 2025-08-20
Attending: INTERNAL MEDICINE
Payer: COMMERCIAL

## 2025-08-20 ENCOUNTER — ONCOLOGY VISIT (OUTPATIENT)
Dept: ONCOLOGY | Facility: OTHER | Age: 40
End: 2025-08-20
Attending: INTERNAL MEDICINE
Payer: COMMERCIAL

## 2025-08-20 VITALS
OXYGEN SATURATION: 92 % | WEIGHT: 166.89 LBS | TEMPERATURE: 98.1 F | HEART RATE: 72 BPM | DIASTOLIC BLOOD PRESSURE: 60 MMHG | RESPIRATION RATE: 20 BRPM | SYSTOLIC BLOOD PRESSURE: 90 MMHG | HEIGHT: 67 IN | BODY MASS INDEX: 26.19 KG/M2

## 2025-08-20 DIAGNOSIS — D75.1 POLYCYTHEMIA: ICD-10-CM

## 2025-08-20 DIAGNOSIS — I50.22 CHRONIC SYSTOLIC CONGESTIVE HEART FAILURE (H): ICD-10-CM

## 2025-08-20 DIAGNOSIS — I10 BENIGN ESSENTIAL HYPERTENSION: ICD-10-CM

## 2025-08-20 DIAGNOSIS — I42.8 NONISCHEMIC CARDIOMYOPATHY (H): ICD-10-CM

## 2025-08-20 LAB
BASOPHILS # BLD AUTO: 0.09 10E3/UL (ref 0–0.2)
BASOPHILS NFR BLD AUTO: 0.9 %
EOSINOPHIL # BLD AUTO: 0.25 10E3/UL (ref 0–0.7)
EOSINOPHIL NFR BLD AUTO: 2.6 %
ERYTHROCYTE [DISTWIDTH] IN BLOOD BY AUTOMATED COUNT: 12.4 % (ref 10–15)
HCT VFR BLD AUTO: 48.8 % (ref 35–47)
HGB BLD-MCNC: 17 G/DL (ref 11.7–15.7)
IMM GRANULOCYTES # BLD: 0.04 10E3/UL
IMM GRANULOCYTES NFR BLD: 0.4 %
LVEF ECHO: NORMAL
LYMPHOCYTES # BLD AUTO: 2.36 10E3/UL (ref 0.8–5.3)
LYMPHOCYTES NFR BLD AUTO: 24.8 %
MCH RBC QN AUTO: 32.1 PG (ref 26.5–33)
MCHC RBC AUTO-ENTMCNC: 34.8 G/DL (ref 31.5–36.5)
MCV RBC AUTO: 92.1 FL (ref 78–100)
MONOCYTES # BLD AUTO: 0.9 10E3/UL (ref 0–1.3)
MONOCYTES NFR BLD AUTO: 9.5 %
NEUTROPHILS # BLD AUTO: 5.86 10E3/UL (ref 1.6–8.3)
NEUTROPHILS NFR BLD AUTO: 61.8 %
NRBC # BLD AUTO: <0.03 10E3/UL
NRBC BLD AUTO-RTO: 0 /100
PLATELET # BLD AUTO: 206 10E3/UL (ref 150–450)
RBC # BLD AUTO: 5.3 10E6/UL (ref 3.8–5.2)
WBC # BLD AUTO: 9.5 10E3/UL (ref 4–11)

## 2025-08-20 PROCEDURE — 93306 TTE W/DOPPLER COMPLETE: CPT

## 2025-08-20 ASSESSMENT — PATIENT HEALTH QUESTIONNAIRE - PHQ9: SUM OF ALL RESPONSES TO PHQ QUESTIONS 1-9: 3

## 2025-08-20 ASSESSMENT — PAIN SCALES - GENERAL: PAINLEVEL_OUTOF10: NO PAIN (0)

## 2025-08-27 ENCOUNTER — MYC MEDICAL ADVICE (OUTPATIENT)
Dept: CARDIOLOGY | Facility: OTHER | Age: 40
End: 2025-08-27

## 2025-08-27 ENCOUNTER — OFFICE VISIT (OUTPATIENT)
Dept: CARDIOLOGY | Facility: OTHER | Age: 40
End: 2025-08-27
Attending: INTERNAL MEDICINE
Payer: COMMERCIAL

## 2025-08-27 VITALS
OXYGEN SATURATION: 97 % | DIASTOLIC BLOOD PRESSURE: 69 MMHG | SYSTOLIC BLOOD PRESSURE: 109 MMHG | RESPIRATION RATE: 16 BRPM | BODY MASS INDEX: 26.06 KG/M2 | HEIGHT: 67 IN | HEART RATE: 61 BPM | WEIGHT: 166 LBS

## 2025-08-27 DIAGNOSIS — I95.1 ORTHOSTATIC HYPOTENSION: Primary | ICD-10-CM

## 2025-08-27 DIAGNOSIS — I10 BENIGN ESSENTIAL HYPERTENSION: ICD-10-CM

## 2025-08-27 DIAGNOSIS — I42.8 NONISCHEMIC CARDIOMYOPATHY (H): ICD-10-CM

## 2025-08-27 DIAGNOSIS — I50.22 CHRONIC SYSTOLIC CONGESTIVE HEART FAILURE (H): ICD-10-CM

## 2025-08-27 DIAGNOSIS — I45.9 HEART BLOCK: ICD-10-CM

## 2025-08-27 RX ORDER — MIDODRINE HYDROCHLORIDE 5 MG/1
5 TABLET ORAL EVERY 12 HOURS
Qty: 180 TABLET | Refills: 3 | Status: SHIPPED | OUTPATIENT
Start: 2025-08-27

## 2025-08-27 RX ORDER — FLUDROCORTISONE ACETATE 0.1 MG/1
0.1 TABLET ORAL DAILY
Qty: 90 TABLET | Refills: 3 | Status: SHIPPED | OUTPATIENT
Start: 2025-08-27

## 2025-08-27 ASSESSMENT — PAIN SCALES - GENERAL: PAINLEVEL_OUTOF10: NO PAIN (0)

## 2025-09-03 ENCOUNTER — TELEPHONE (OUTPATIENT)
Dept: SURGERY | Facility: OTHER | Age: 40
End: 2025-09-03
Payer: COMMERCIAL

## (undated) DEVICE — ELECTRODE DEFIB CADENCE 22550R

## (undated) DEVICE — KIT VENOUS FLUSH 60210177

## (undated) DEVICE — KIT MICROINTRODUCER VASCULAR VSI 4FRX0.018INX40CM 7195X

## (undated) DEVICE — INTRODUCER SHEATH FAST-CATH CATH-LOCK 8FRX12CM 406706

## (undated) DEVICE — CATH EP 7FR X 115CM DECANAV CA

## (undated) DEVICE — CATH ANGIO SUPERTORQUE PLUS JL4 6FRX100CM 533620

## (undated) DEVICE — INTRO SHEATH 4FRX10CM PINNACLE RSS402

## (undated) DEVICE — PACK HEART LEFT CUSTOM

## (undated) DEVICE — CATH THERMOCOOL SMARTTOUCH SF DF CURVE

## (undated) DEVICE — CUFF FINGER CLEARSIGHT MED CSCM

## (undated) DEVICE — PATCH CARTO 3 EXTERNAL REFERENCE 3D MAPPING CREFP6

## (undated) DEVICE — INTRODUCER SHEATH FAST-CATH 9FRX12CM 406116

## (undated) DEVICE — CATH SOUNDSTAR 8FRX90CM 10439011

## (undated) DEVICE — INTRO CATH 12CM 8.5FR FST-CATH

## (undated) DEVICE — RAD CLOSURE ANGIOSEAL 8FR  610131

## (undated) DEVICE — INTRO SHEATH 9FRX25CM PINNACLE RSS906

## (undated) DEVICE — MANIFOLD CUSTOM KIT FOR CTO PROCEDURE K09-11989

## (undated) DEVICE — INTRODUCER SHEATH FAST-CATH CATH-LOCK 6FRX12CM 406701

## (undated) DEVICE — TUBE SET SMARKABLATE IRRIGATION

## (undated) RX ORDER — HEPARIN SODIUM 1000 [USP'U]/ML
INJECTION, SOLUTION INTRAVENOUS; SUBCUTANEOUS
Status: DISPENSED
Start: 2023-09-25

## (undated) RX ORDER — SODIUM CHLORIDE 9 MG/ML
INJECTION, SOLUTION INTRAVENOUS
Status: DISPENSED
Start: 2025-06-09

## (undated) RX ORDER — PROTAMINE SULFATE 10 MG/ML
INJECTION, SOLUTION INTRAVENOUS
Status: DISPENSED
Start: 2023-09-25

## (undated) RX ORDER — OXYCODONE HCL 10 MG/1
TABLET, FILM COATED, EXTENDED RELEASE ORAL
Status: DISPENSED
Start: 2023-09-25

## (undated) RX ORDER — FENTANYL CITRATE 50 UG/ML
INJECTION, SOLUTION INTRAMUSCULAR; INTRAVENOUS
Status: DISPENSED
Start: 2023-09-25

## (undated) RX ORDER — SODIUM CHLORIDE 9 MG/ML
INJECTION, SOLUTION INTRAVENOUS
Status: DISPENSED
Start: 2023-09-25

## (undated) RX ORDER — OXYCODONE AND ACETAMINOPHEN 5; 325 MG/1; MG/1
TABLET ORAL
Status: DISPENSED
Start: 2023-09-25

## (undated) RX ORDER — LIDOCAINE HYDROCHLORIDE 10 MG/ML
INJECTION, SOLUTION EPIDURAL; INFILTRATION; INTRACAUDAL; PERINEURAL
Status: DISPENSED
Start: 2023-09-25

## (undated) RX ORDER — BUPIVACAINE HYDROCHLORIDE 5 MG/ML
INJECTION, SOLUTION EPIDURAL; INTRACAUDAL
Status: DISPENSED
Start: 2023-09-25

## (undated) RX ORDER — CEFAZOLIN SODIUM 2 G/100ML
INJECTION, SOLUTION INTRAVENOUS
Status: DISPENSED
Start: 2023-09-25